# Patient Record
Sex: FEMALE | Race: OTHER | HISPANIC OR LATINO | Employment: FULL TIME | URBAN - METROPOLITAN AREA
[De-identification: names, ages, dates, MRNs, and addresses within clinical notes are randomized per-mention and may not be internally consistent; named-entity substitution may affect disease eponyms.]

---

## 2017-10-24 ENCOUNTER — APPOINTMENT (OUTPATIENT)
Dept: LAB | Facility: MEDICAL CENTER | Age: 42
End: 2017-10-24

## 2017-10-24 ENCOUNTER — TRANSCRIBE ORDERS (OUTPATIENT)
Dept: URGENT CARE | Facility: MEDICAL CENTER | Age: 42
End: 2017-10-24

## 2017-10-24 DIAGNOSIS — Z00.00 PHYSICAL EXAM: ICD-10-CM

## 2017-10-24 DIAGNOSIS — Z00.00 PHYSICAL EXAM: Primary | ICD-10-CM

## 2017-10-24 PROCEDURE — 36415 COLL VENOUS BLD VENIPUNCTURE: CPT

## 2017-10-24 PROCEDURE — 86480 TB TEST CELL IMMUN MEASURE: CPT

## 2017-10-26 LAB
ANNOTATION COMMENT IMP: NORMAL
GAMMA INTERFERON BACKGROUND BLD IA-ACNC: 0.09 IU/ML
M TB IFN-G BLD-IMP: NEGATIVE
M TB IFN-G CD4+ BCKGRND COR BLD-ACNC: 0 IU/ML
M TB IFN-G CD4+ T-CELLS BLD-ACNC: 0.09 IU/ML
MITOGEN IGNF BLD-ACNC: 6.56 IU/ML
QUANTIFERON-TB GOLD IN TUBE: NORMAL
SERVICE CMNT-IMP: NORMAL

## 2017-11-18 ENCOUNTER — APPOINTMENT (OUTPATIENT)
Dept: LAB | Facility: CLINIC | Age: 42
End: 2017-11-18

## 2017-11-18 ENCOUNTER — TRANSCRIBE ORDERS (OUTPATIENT)
Dept: LAB | Facility: CLINIC | Age: 42
End: 2017-11-18

## 2017-11-18 DIAGNOSIS — Z01.84 IMMUNITY STATUS TESTING: Primary | ICD-10-CM

## 2017-11-18 DIAGNOSIS — Z01.84 IMMUNITY STATUS TESTING: ICD-10-CM

## 2017-11-18 PROCEDURE — 86787 VARICELLA-ZOSTER ANTIBODY: CPT

## 2017-11-18 PROCEDURE — 36415 COLL VENOUS BLD VENIPUNCTURE: CPT

## 2017-11-21 LAB — VZV IGG SER IA-ACNC: NORMAL

## 2018-03-12 ENCOUNTER — OFFICE VISIT (OUTPATIENT)
Dept: OBGYN CLINIC | Facility: CLINIC | Age: 43
End: 2018-03-12
Payer: COMMERCIAL

## 2018-03-12 VITALS
SYSTOLIC BLOOD PRESSURE: 122 MMHG | HEIGHT: 60 IN | WEIGHT: 172 LBS | BODY MASS INDEX: 33.77 KG/M2 | DIASTOLIC BLOOD PRESSURE: 60 MMHG

## 2018-03-12 DIAGNOSIS — Z01.419 GYNECOLOGIC EXAM NORMAL: Primary | ICD-10-CM

## 2018-03-12 DIAGNOSIS — Z12.4 PAP SMEAR FOR CERVICAL CANCER SCREENING: ICD-10-CM

## 2018-03-12 DIAGNOSIS — Z12.39 SCREENING FOR MALIGNANT NEOPLASM OF BREAST: ICD-10-CM

## 2018-03-12 PROCEDURE — G0145 SCR C/V CYTO,THINLAYER,RESCR: HCPCS | Performed by: OBSTETRICS & GYNECOLOGY

## 2018-03-12 PROCEDURE — 99396 PREV VISIT EST AGE 40-64: CPT | Performed by: OBSTETRICS & GYNECOLOGY

## 2018-03-12 PROCEDURE — 87624 HPV HI-RISK TYP POOLED RSLT: CPT | Performed by: OBSTETRICS & GYNECOLOGY

## 2018-03-12 NOTE — PROGRESS NOTES
Chris Bloom is a 44 y o  female  who presents for annual well woman exam  Periods are regular every 28-30 days, lasting 2 days  No intermenstrual bleeding, spotting, or discharge  Menses have been light and very well controlled since ablation  Patient reports No hot flashes/night sweats, No pain problems intercourse, Yes  vaginal dryness, using lubrication  still very busy and traveling  sleeping fairly well   Often has trouble swallowing sleep or getting back to sleep after waking but overall okay    Current contraception: tubal ligation  History of abnormal Pap smear: no  Family history of uterine or ovarian cancer: no  Regular self breast exam: yes  History of abnormal mammogram: no  Family history of breast cancer: no  Family history of heart disease diabetes and hypertension paternal grandmother with vulvar cancer    Menstrual History:  OB History      Para Term  AB Living    6         3    SAB TAB Ectopic Multiple Live Births            1         Menarche age: 7   LMP Mar 8, 2018     The following portions of the patient's history were reviewed and updated as appropriate: allergies, current medications, past family history, past medical history, past social history, past surgical history and problem list   Past Medical History:   Diagnosis Date    Palpitations     Thyroid nodule     Last Assessed: 2014      Past Surgical History:   Procedure Laterality Date    BREAST BIOPSY Right     Percutaneous Needle Core Right      SECTION      CHOLECYSTECTOMY      Laparoscopic     TUBAL LIGATION      WISDOM TOOTH EXTRACTION       OB History      Para Term  AB Living    6         3    SAB TAB Ectopic Multiple Live Births            3       1998  female elevated BP, May 2002  for breech male, 2004  female     Review of Systems  Review of Systems   Constitutional: Negative for fatigue, fever and unexpected weight change  HENT: Negative for dental problem, mouth sores, nosebleeds, rhinorrhea, sinus pain, sinus pressure and sore throat  Eyes: Negative for pain, discharge and visual disturbance  Respiratory: Negative for cough, chest tightness, shortness of breath and wheezing  Cardiovascular: Negative for chest pain, palpitations and leg swelling  Gastrointestinal: Negative for blood in stool, constipation, diarrhea, nausea and vomiting  Endocrine: Negative for polydipsia  Genitourinary: Negative for difficulty urinating, dyspareunia, dysuria, menstrual problem, pelvic pain, urgency, vaginal discharge and vaginal pain  Musculoskeletal: Negative for arthralgias, back pain and joint swelling  Allergic/Immunologic: Negative for environmental allergies  Neurological: Negative for seizures, light-headedness and headaches  Hematological: Does not bruise/bleed easily  Psychiatric/Behavioral: Negative for sleep disturbance  The patient is not nervous/anxious  Objective      There were no vitals taken for this visit     Vitals:    18 1000   BP: 122/60         General:   alert and oriented, in no acute distress, alert, appears stated age and cooperative   Heart: regular rate and rhythm, S1, S2 normal, no murmur, click, rub or gallop   Lungs: clear to auscultation bilaterally   Abdomen: soft, non-tender, without masses or organomegaly   Vulva: normal   Vagina: normal mucosa   Cervix: no cervical motion tenderness and no lesions   Uterus: Top-normal size anteverted freely mobile, possible fibroid   Adnexa: normal adnexa and no mass, fullness, tenderness   inspection negative, no nipple discharge or bleeding, no masses or nodularity palpable  deferred  Pupils equal round reactive to light and accommodation  Throat clear no lesions or findings  Thyroid normal no palpable masses or nodules            Assessment   44year-old  here for annual exam   Patient is generally doing well and adjusting well to her new job  Plan   Patient given slip for mammogram, patient should return in 1 year or sooner as needed Pap with Co testing performed today last Pap was 2015 normal and last mammogram was last year

## 2018-03-14 LAB — HPV RRNA GENITAL QL NAA+PROBE: NORMAL

## 2018-03-15 LAB
LAB AP GYN PRIMARY INTERPRETATION: NORMAL
LAB AP LMP: NORMAL
Lab: NORMAL

## 2018-04-12 ENCOUNTER — HOSPITAL ENCOUNTER (OUTPATIENT)
Dept: RADIOLOGY | Age: 43
Discharge: HOME | End: 2018-04-12
Payer: COMMERCIAL

## 2018-04-12 DIAGNOSIS — Z12.39 SCREENING FOR MALIGNANT NEOPLASM OF BREAST: ICD-10-CM

## 2018-04-12 PROCEDURE — 77067 SCR MAMMO BI INCL CAD: CPT

## 2018-04-12 PROCEDURE — 77063 BREAST TOMOSYNTHESIS BI: CPT

## 2018-04-18 DIAGNOSIS — R92.8 ABNORMAL MAMMOGRAM OF BOTH BREASTS: Primary | ICD-10-CM

## 2018-04-24 ENCOUNTER — HOSPITAL ENCOUNTER (OUTPATIENT)
Dept: ULTRASOUND IMAGING | Facility: CLINIC | Age: 43
Discharge: HOME | End: 2018-04-24
Payer: COMMERCIAL

## 2018-04-24 ENCOUNTER — TRANSCRIBE ORDERS (OUTPATIENT)
Dept: ADMINISTRATIVE | Facility: HOSPITAL | Age: 43
End: 2018-04-24

## 2018-04-24 ENCOUNTER — HOSPITAL ENCOUNTER (OUTPATIENT)
Dept: MAMMOGRAPHY | Facility: CLINIC | Age: 43
Discharge: HOME | End: 2018-04-24
Payer: COMMERCIAL

## 2018-04-24 DIAGNOSIS — R92.8 ABNORMAL MAMMOGRAM: Primary | ICD-10-CM

## 2018-04-24 DIAGNOSIS — R92.8 ABNORMAL MAMMOGRAM OF BOTH BREASTS: ICD-10-CM

## 2018-04-24 PROCEDURE — 76642 ULTRASOUND BREAST LIMITED: CPT

## 2018-04-24 PROCEDURE — 77066 DX MAMMO INCL CAD BI: CPT

## 2018-04-24 PROCEDURE — G0279 TOMOSYNTHESIS, MAMMO: HCPCS

## 2018-05-14 ENCOUNTER — APPOINTMENT (EMERGENCY)
Dept: ULTRASOUND IMAGING | Facility: HOSPITAL | Age: 43
End: 2018-05-14
Payer: COMMERCIAL

## 2018-05-14 ENCOUNTER — APPOINTMENT (EMERGENCY)
Dept: CT IMAGING | Facility: HOSPITAL | Age: 43
End: 2018-05-14
Payer: COMMERCIAL

## 2018-05-14 ENCOUNTER — TELEPHONE (OUTPATIENT)
Dept: OBGYN CLINIC | Facility: CLINIC | Age: 43
End: 2018-05-14

## 2018-05-14 ENCOUNTER — HOSPITAL ENCOUNTER (EMERGENCY)
Facility: HOSPITAL | Age: 43
Discharge: HOME | End: 2018-05-14
Attending: EMERGENCY MEDICINE | Admitting: EMERGENCY MEDICINE
Payer: COMMERCIAL

## 2018-05-14 VITALS
DIASTOLIC BLOOD PRESSURE: 63 MMHG | TEMPERATURE: 97.7 F | SYSTOLIC BLOOD PRESSURE: 132 MMHG | BODY MASS INDEX: 33.79 KG/M2 | WEIGHT: 173 LBS | RESPIRATION RATE: 18 BRPM | HEART RATE: 64 BPM | OXYGEN SATURATION: 100 %

## 2018-05-14 DIAGNOSIS — R10.30 LOWER ABDOMINAL PAIN: Primary | ICD-10-CM

## 2018-05-14 DIAGNOSIS — N94.89 FEMALE PELVIC CONGESTION SYNDROME: ICD-10-CM

## 2018-05-14 DIAGNOSIS — R11.0 NAUSEA: ICD-10-CM

## 2018-05-14 LAB
ALBUMIN SERPL BCP-MCNC: 3.9 G/DL (ref 3.5–5)
ALP SERPL-CCNC: 67 U/L (ref 46–116)
ALT SERPL W P-5'-P-CCNC: 27 U/L (ref 12–78)
ANION GAP SERPL CALCULATED.3IONS-SCNC: 8 MMOL/L (ref 4–13)
AST SERPL W P-5'-P-CCNC: 23 U/L (ref 5–45)
ATRIAL RATE: 71 BPM
B-HCG SERPL-ACNC: <2 MIU/ML
BACTERIA UR QL AUTO: ABNORMAL /HPF
BASOPHILS # BLD AUTO: 0.02 THOUSANDS/ΜL (ref 0–0.1)
BASOPHILS NFR BLD AUTO: 0 % (ref 0–1)
BILIRUB SERPL-MCNC: 0.4 MG/DL (ref 0.2–1)
BILIRUB UR QL STRIP: NEGATIVE
BUN SERPL-MCNC: 15 MG/DL (ref 5–25)
CALCIUM SERPL-MCNC: 9.2 MG/DL (ref 8.3–10.1)
CHLORIDE SERPL-SCNC: 104 MMOL/L (ref 100–108)
CLARITY UR: CLEAR
CO2 SERPL-SCNC: 28 MMOL/L (ref 21–32)
COLOR UR: YELLOW
CREAT SERPL-MCNC: 0.69 MG/DL (ref 0.6–1.3)
EOSINOPHIL # BLD AUTO: 0.13 THOUSAND/ΜL (ref 0–0.61)
EOSINOPHIL NFR BLD AUTO: 2 % (ref 0–6)
ERYTHROCYTE [DISTWIDTH] IN BLOOD BY AUTOMATED COUNT: 12.6 % (ref 11.6–15.1)
EXT PREG TEST URINE: NEGATIVE
GFR SERPL CREATININE-BSD FRML MDRD: 108 ML/MIN/1.73SQ M
GLUCOSE SERPL-MCNC: 117 MG/DL (ref 65–140)
GLUCOSE UR STRIP-MCNC: NEGATIVE MG/DL
HCT VFR BLD AUTO: 40.8 % (ref 34.8–46.1)
HGB BLD-MCNC: 14 G/DL (ref 11.5–15.4)
HGB UR QL STRIP.AUTO: ABNORMAL
KETONES UR STRIP-MCNC: NEGATIVE MG/DL
LACTATE SERPL-SCNC: 0.8 MMOL/L (ref 0.5–2)
LEUKOCYTE ESTERASE UR QL STRIP: NEGATIVE
LIPASE SERPL-CCNC: 216 U/L (ref 73–393)
LYMPHOCYTES # BLD AUTO: 2.26 THOUSANDS/ΜL (ref 0.6–4.47)
LYMPHOCYTES NFR BLD AUTO: 29 % (ref 14–44)
MCH RBC QN AUTO: 29.2 PG (ref 26.8–34.3)
MCHC RBC AUTO-ENTMCNC: 34.3 G/DL (ref 31.4–37.4)
MCV RBC AUTO: 85 FL (ref 82–98)
MONOCYTES # BLD AUTO: 0.36 THOUSAND/ΜL (ref 0.17–1.22)
MONOCYTES NFR BLD AUTO: 5 % (ref 4–12)
MUCOUS THREADS UR QL AUTO: ABNORMAL
NEUTROPHILS # BLD AUTO: 4.93 THOUSANDS/ΜL (ref 1.85–7.62)
NEUTS SEG NFR BLD AUTO: 64 % (ref 43–75)
NITRITE UR QL STRIP: NEGATIVE
NON-SQ EPI CELLS URNS QL MICRO: ABNORMAL /HPF
P AXIS: 61 DEGREES
PH UR STRIP.AUTO: 6.5 [PH] (ref 4.5–8)
PLATELET # BLD AUTO: 330 THOUSANDS/UL (ref 149–390)
PMV BLD AUTO: 9.4 FL (ref 8.9–12.7)
POTASSIUM SERPL-SCNC: 4.1 MMOL/L (ref 3.5–5.3)
PR INTERVAL: 164 MS
PROT SERPL-MCNC: 7.9 G/DL (ref 6.4–8.2)
PROT UR STRIP-MCNC: NEGATIVE MG/DL
QRS AXIS: 40 DEGREES
QRSD INTERVAL: 78 MS
QT INTERVAL: 374 MS
QTC INTERVAL: 406 MS
RBC # BLD AUTO: 4.8 MILLION/UL (ref 3.81–5.12)
RBC #/AREA URNS AUTO: ABNORMAL /HPF
SODIUM SERPL-SCNC: 140 MMOL/L (ref 136–145)
SP GR UR STRIP.AUTO: 1.02 (ref 1–1.03)
T WAVE AXIS: 35 DEGREES
UROBILINOGEN UR QL STRIP.AUTO: 0.2 E.U./DL
VENTRICULAR RATE: 71 BPM
WBC # BLD AUTO: 7.7 THOUSAND/UL (ref 4.31–10.16)
WBC #/AREA URNS AUTO: ABNORMAL /HPF

## 2018-05-14 PROCEDURE — 74177 CT ABD & PELVIS W/CONTRAST: CPT

## 2018-05-14 PROCEDURE — 76856 US EXAM PELVIC COMPLETE: CPT

## 2018-05-14 PROCEDURE — 81001 URINALYSIS AUTO W/SCOPE: CPT

## 2018-05-14 PROCEDURE — 81025 URINE PREGNANCY TEST: CPT | Performed by: EMERGENCY MEDICINE

## 2018-05-14 PROCEDURE — 80053 COMPREHEN METABOLIC PANEL: CPT | Performed by: EMERGENCY MEDICINE

## 2018-05-14 PROCEDURE — 96361 HYDRATE IV INFUSION ADD-ON: CPT

## 2018-05-14 PROCEDURE — 36415 COLL VENOUS BLD VENIPUNCTURE: CPT | Performed by: EMERGENCY MEDICINE

## 2018-05-14 PROCEDURE — 83605 ASSAY OF LACTIC ACID: CPT | Performed by: EMERGENCY MEDICINE

## 2018-05-14 PROCEDURE — 96376 TX/PRO/DX INJ SAME DRUG ADON: CPT

## 2018-05-14 PROCEDURE — 83690 ASSAY OF LIPASE: CPT | Performed by: EMERGENCY MEDICINE

## 2018-05-14 PROCEDURE — 96375 TX/PRO/DX INJ NEW DRUG ADDON: CPT

## 2018-05-14 PROCEDURE — 96374 THER/PROPH/DIAG INJ IV PUSH: CPT

## 2018-05-14 PROCEDURE — 85025 COMPLETE CBC W/AUTO DIFF WBC: CPT | Performed by: EMERGENCY MEDICINE

## 2018-05-14 PROCEDURE — 76830 TRANSVAGINAL US NON-OB: CPT

## 2018-05-14 PROCEDURE — 84702 CHORIONIC GONADOTROPIN TEST: CPT | Performed by: EMERGENCY MEDICINE

## 2018-05-14 PROCEDURE — 93010 ELECTROCARDIOGRAM REPORT: CPT | Performed by: INTERNAL MEDICINE

## 2018-05-14 PROCEDURE — 93005 ELECTROCARDIOGRAM TRACING: CPT

## 2018-05-14 PROCEDURE — 87086 URINE CULTURE/COLONY COUNT: CPT | Performed by: EMERGENCY MEDICINE

## 2018-05-14 PROCEDURE — 99284 EMERGENCY DEPT VISIT MOD MDM: CPT

## 2018-05-14 RX ORDER — SODIUM CHLORIDE 9 MG/ML
125 INJECTION, SOLUTION INTRAVENOUS CONTINUOUS
Status: DISCONTINUED | OUTPATIENT
Start: 2018-05-14 | End: 2018-05-14 | Stop reason: HOSPADM

## 2018-05-14 RX ORDER — ONDANSETRON 2 MG/ML
4 INJECTION INTRAMUSCULAR; INTRAVENOUS ONCE
Status: COMPLETED | OUTPATIENT
Start: 2018-05-14 | End: 2018-05-14

## 2018-05-14 RX ORDER — ONDANSETRON 4 MG/1
4 TABLET, ORALLY DISINTEGRATING ORAL EVERY 8 HOURS PRN
Qty: 15 TABLET | Refills: 0 | Status: SHIPPED | OUTPATIENT
Start: 2018-05-14 | End: 2020-05-11 | Stop reason: ALTCHOICE

## 2018-05-14 RX ORDER — OXYCODONE HYDROCHLORIDE AND ACETAMINOPHEN 5; 325 MG/1; MG/1
1 TABLET ORAL EVERY 4 HOURS PRN
Qty: 20 TABLET | Refills: 0 | Status: SHIPPED | OUTPATIENT
Start: 2018-05-14 | End: 2018-05-18

## 2018-05-14 RX ORDER — KETOROLAC TROMETHAMINE 30 MG/ML
30 INJECTION, SOLUTION INTRAMUSCULAR; INTRAVENOUS ONCE
Status: COMPLETED | OUTPATIENT
Start: 2018-05-14 | End: 2018-05-14

## 2018-05-14 RX ORDER — NAPROXEN SODIUM 550 MG/1
550 TABLET ORAL 2 TIMES DAILY WITH MEALS
Qty: 20 TABLET | Refills: 0 | Status: SHIPPED | OUTPATIENT
Start: 2018-05-14 | End: 2020-05-11 | Stop reason: ALTCHOICE

## 2018-05-14 RX ADMIN — HYDROMORPHONE HYDROCHLORIDE 0.5 MG: 1 INJECTION, SOLUTION INTRAMUSCULAR; INTRAVENOUS; SUBCUTANEOUS at 04:05

## 2018-05-14 RX ADMIN — HYDROMORPHONE HYDROCHLORIDE 0.5 MG: 1 INJECTION, SOLUTION INTRAMUSCULAR; INTRAVENOUS; SUBCUTANEOUS at 04:59

## 2018-05-14 RX ADMIN — ONDANSETRON 4 MG: 2 INJECTION INTRAMUSCULAR; INTRAVENOUS at 03:06

## 2018-05-14 RX ADMIN — IOHEXOL 100 ML: 350 INJECTION, SOLUTION INTRAVENOUS at 03:57

## 2018-05-14 RX ADMIN — KETOROLAC TROMETHAMINE 30 MG: 30 INJECTION, SOLUTION INTRAMUSCULAR at 03:08

## 2018-05-14 RX ADMIN — SODIUM CHLORIDE 1000 ML: 0.9 INJECTION, SOLUTION INTRAVENOUS at 03:04

## 2018-05-14 RX ADMIN — ONDANSETRON 4 MG: 2 INJECTION INTRAMUSCULAR; INTRAVENOUS at 04:03

## 2018-05-14 RX ADMIN — HYDROMORPHONE HYDROCHLORIDE 0.5 MG: 1 INJECTION, SOLUTION INTRAMUSCULAR; INTRAVENOUS; SUBCUTANEOUS at 05:34

## 2018-05-14 NOTE — ED PROVIDER NOTES
History  Chief Complaint   Patient presents with    Abdominal Pain     Pt presents to ED for evaluation and treatment of right lower quadrant abdominal pain which began 1 5 hours ago     Patient is a 44year old female with constant worsening RLQ abdominal pain with nausea for past 1 5 hours  No fever  No vomiting or diarrhea  No GI bleeding  No urinary sx  States she did not drive here  Wants toradol and no narcotic pain medication at this time  Has had prior TL,  and ccy in the past  Was last seen in this ED on 16 for chest pain  [facility] patient and patient not found  (+) radiation of pain to suprapubic region and down right leg as well  History provided by:  Patient and spouse   used: No    Abdominal Pain   Associated symptoms: nausea    Associated symptoms: no diarrhea, no fever and no vomiting        None       Past Medical History:   Diagnosis Date    Palpitations     Thyroid nodule     Last Assessed: 2014        Past Surgical History:   Procedure Laterality Date    BREAST BIOPSY Right     Percutaneous Needle Core Right      SECTION      CHOLECYSTECTOMY      Laparoscopic     TUBAL LIGATION      WISDOM TOOTH EXTRACTION         Family History   Problem Relation Age of Onset    Hypertension Mother     Heart disease Mother     Nephrolithiasis Mother     Hypertension Maternal Grandmother     Diabetes Maternal Grandmother     Stroke Maternal Grandmother      Stroke Syndrome     Other Maternal Grandfather      CABG     Stroke Maternal Grandfather      Stroke Syndrome     Diabetes Paternal Grandmother     Cancer Paternal Grandmother     Hyperthyroidism Family     Hypothyroidism Family     Hodgkin's lymphoma Paternal Uncle      I have reviewed and agree with the history as documented      Social History   Substance Use Topics    Smoking status: Never Smoker    Smokeless tobacco: Not on file    Alcohol use Yes Comment: social        Review of Systems   Constitutional: Negative for fever  Gastrointestinal: Positive for abdominal pain and nausea  Negative for blood in stool, diarrhea and vomiting  Genitourinary: Negative for difficulty urinating  All other systems reviewed and are negative  Physical Exam  ED Triage Vitals [05/14/18 0238]   Temperature Pulse Respirations Blood Pressure SpO2   97 7 °F (36 5 °C) 87 18 146/84 98 %      Temp Source Heart Rate Source Patient Position - Orthostatic VS BP Location FiO2 (%)   Oral Monitor Sitting Right arm --      Pain Score       7           Orthostatic Vital Signs  Vitals:    05/14/18 0238 05/14/18 0545   BP: 146/84 132/63   Pulse: 87 64   Patient Position - Orthostatic VS: Sitting Lying       Physical Exam   Constitutional: She is oriented to person, place, and time  She appears well-developed and well-nourished  She appears distressed (moderate)  HENT:   Head: Normocephalic and atraumatic  Mucous membranes somewhat moist     Eyes: No scleral icterus  Neck: No tracheal deviation present  Cardiovascular: Normal rate, regular rhythm and normal heart sounds  No murmur heard  Pulmonary/Chest: Effort normal and breath sounds normal  No stridor  No respiratory distress  Abdominal: Soft  Bowel sounds are normal  She exhibits no distension  There is tenderness (RLQ)  There is no rebound and no guarding  No CVAT  Musculoskeletal: She exhibits no edema or deformity  Neurological: She is alert and oriented to person, place, and time  Skin: Skin is warm and dry  No rash noted  Psychiatric: She has a normal mood and affect  Nursing note and vitals reviewed        ED Medications  Medications   sodium chloride 0 9 % infusion (not administered)   ondansetron (ZOFRAN) injection 4 mg (4 mg Intravenous Given 5/14/18 0306)   ketorolac (TORADOL) injection 30 mg (30 mg Intravenous Given 5/14/18 0308)   sodium chloride 0 9 % bolus 1,000 mL (1,000 mL Intravenous New Bag 5/14/18 0304)   iohexol (OMNIPAQUE) 350 MG/ML injection (MULTI-DOSE) 100 mL (100 mL Intravenous Given 5/14/18 0357)   HYDROmorphone (DILAUDID) injection 0 5 mg (0 5 mg Intravenous Given 5/14/18 0405)   ondansetron (ZOFRAN) injection 4 mg (4 mg Intravenous Given 5/14/18 0403)   HYDROmorphone (DILAUDID) injection 0 5 mg (0 5 mg Intravenous Given 5/14/18 0459)   HYDROmorphone (DILAUDID) injection 1 mg (0 5 mg Intravenous Given 5/14/18 0534)       Diagnostic Studies  Results Reviewed     Procedure Component Value Units Date/Time    Lactic acid, plasma [[id number]]  (Normal) Collected:  05/14/18 0256    Lab Status:  Final result Specimen:  Blood from Arm, Left Updated:  05/14/18 0334     LACTIC ACID 0 8 mmol/L     Narrative:         Result may be elevated if tourniquet was used during collection  Comprehensive metabolic panel [[id number]] Collected:  05/14/18 0256    Lab Status:  Final result Specimen:  Blood from Arm, Left Updated:  05/14/18 0326     Sodium 140 mmol/L      Potassium 4 1 mmol/L      Chloride 104 mmol/L      CO2 28 mmol/L      Anion Gap 8 mmol/L      BUN 15 mg/dL      Creatinine 0 69 mg/dL      Glucose 117 mg/dL      Calcium 9 2 mg/dL      AST 23 U/L      ALT 27 U/L      Alkaline Phosphatase 67 U/L      Total Protein 7 9 g/dL      Albumin 3 9 g/dL      Total Bilirubin 0 40 mg/dL      eGFR 108 ml/min/1 73sq m     Narrative:         National Kidney Disease Education Program recommendations are as follows:  GFR calculation is accurate only with a steady state creatinine  Chronic Kidney disease less than 60 ml/min/1 73 sq  meters  Kidney failure less than 15 ml/min/1 73 sq  meters      Quantitative hCG [[id number]]  (Normal) Collected:  05/14/18 0256    Lab Status:  Final result Specimen:  Blood from Arm, Left Updated:  05/14/18 0326     HCG, Quant <2 mIU/mL     Narrative:          Expected Ranges:     Approximate               Approximate HCG  Gestation age          Concentration ( mIU/mL)  _____________ ______________________   [facility]                      HCG values  0 2-1                       5-50  1-2                           2-3                         100-5000  3-4                         500-96940  4-5                         1000-11616  5-6                         [id number]  6-8                         [id number]  8-12                        03971-667506    Urine culture [[id number]] Collected:  05/14/18 0307    Lab Status:   In process Specimen:  Urine from Urine, Clean Catch Updated:  05/14/18 0326    Lipase [[id number]]  (Normal) Collected:  05/14/18 0256    Lab Status:  Final result Specimen:  Blood from Arm, Left Updated:  05/14/18 0324     Lipase 216 u/L     Urine Microscopic [[id number]]  (Abnormal) Collected:  05/14/18 0310    Lab Status:  Final result Specimen:  Urine from Urine, Clean Catch Updated:  05/14/18 0321     RBC, UA 1-2 (A) /hpf      WBC, UA 1-2 (A) /hpf      Epithelial Cells Moderate (A) /hpf      Bacteria, UA Moderate (A) /hpf      MUCOUS THREADS Occasional (A)    POCT pregnancy, urine [[id number]]  (Normal) Resulted:  05/14/18 0305    Lab Status:  Final result Updated:  05/14/18 0308     EXT PREG TEST UR (Ref: Negative) negative    ED Urine Macroscopic [[id number]]  (Abnormal) Collected:  05/14/18 0310    Lab Status:  Final result Specimen:  Urine Updated:  05/14/18 0307     Color, UA Yellow     Clarity, UA Clear     pH, UA 6 5     Leukocytes, UA Negative     Nitrite, UA Negative     Protein, UA Negative mg/dl      Glucose, UA Negative mg/dl      Ketones, UA Negative mg/dl      Urobilinogen, UA 0 2 E U /dl      Bilirubin, UA Negative     Blood, UA Trace (A)     Specific [place], UA 1 020    Narrative:       CLINITEK RESULT    CBC and differential [[id number]]  (Normal) Collected:  05/14/18 0256    Lab Status:  Final result Specimen:  Blood from Arm, Left Updated:  05/14/18 0302     WBC 7 70 Thousand/uL      RBC 4 80 Million/uL      Hemoglobin 14 0 g/dL      Hematocrit 40 8 % MCV 85 fL      MCH 29 2 pg      MCHC 34 3 g/dL      RDW 12 6 %      MPV 9 4 fL      Platelets 444 Thousands/uL      Neutrophils Relative 64 %      Lymphocytes Relative 29 %      Monocytes Relative 5 %      Eosinophils Relative 2 %      Basophils Relative 0 %      Neutrophils Absolute 4 93 Thousands/µL      Lymphocytes Absolute 2 26 Thousands/µL      Monocytes Absolute 0 36 Thousand/µL      Eosinophils Absolute 0 13 Thousand/µL      Basophils Absolute 0 02 Thousands/µL                  US pelvis complete w transvaginal   ED Interpretation by Antwon Aponte MD (05/14 0800)   FINDINGS:      UTERUS:   The uterus is anteverted in position, measuring 12 3 x 4 0 x 6 6 cm  Contour and echotexture appear normal    The cervix shows no suspicious abnormality   A tiny nabothian cyst noted  ENDOMETRIUM:     Normal caliber of 5 7 mm  Homogenous and normal in appearance  OVARIES/ADNEXA:   Right ovary:  3 2 x 1 4 x 2 7 cm  No suspicious right ovarian abnormality    The ovary is seen high in the pelvis on transabdominal imaging only  Doppler flow within normal limits  Left ovary:  2 2 x 1 0 x 1 7 cm  No suspicious left ovarian abnormality  Doppler flow within normal limits  No suspicious adnexal mass or loculated collections  There is no free fluid  Impression:         Normal pelvic ultrasound without evidence for ovarian torsion                       Workstation performed: [id number]         Final Result by Lesly Barragan MD (05/14 4348)       Normal pelvic ultrasound without evidence for ovarian torsion  Workstation performed: [id number]         CT abdomen pelvis with contrast   ED Interpretation by Antwon Aponte MD (53/48 5200)   COMPARISON:   TCT ABD/PELVIS W/ [id number] (3262) 2014-04-01 06:38   FINDINGS:   Lung bases: Unremarkable  No mass  No consolidation  ABDOMEN:   Liver: Unremarkable  No mass  Gallbladder and bile ducts:  The patient is status post cholecystectomy  No ductal dilation  Pancreas: Unremarkable  No mass  No ductal dilation  Spleen: Unremarkable  No splenomegaly  Adrenals: Unremarkable  No mass  Kidneys and ureters: See below  Stomach and bowel: See below  PELVIS:   Appendix: No findings to suggest acute appendicitis  Bladder: Unremarkable  No mass  Reproductive: Unremarkable as visualized  ABDOMEN and PELVIS:   Intraperitoneal space: Unremarkable  No free air  No significant fluid collection  Bones/joints: No acute fracture  No dislocation  Soft tissues: Unremarkable  Vasculature: Prominent parametrial vessels on the left and left-sided gonadal vein noted  There is a   narrowed appearance of the left renal vein as it crosses between the aorta and the transverse portion of the duodenum  Pelvic congestion syndrome secondary to a variant of Nutcracker syndrome not   excluded in the correct clinical setting, which could include intermittent hematuria and left-sided   pelvic/flank pain    No abdominal aortic aneurysm  Lymph nodes: Unremarkable  No enlarged lymph nodes  IMPRESSION:   Prominent parametrial vessels on the left and left-sided gonadal vein noted  There is a narrowed   appearance of the left renal vein as it crosses between the aorta and the transverse portion of the   duodenum  Pelvic congestion syndrome secondary to a variant of Nutcracker syndrome not excluded   in the correct clinical setting, which could include intermittent hematuria and left-sided pelvic/flank   pain      Thank you for allowing us to participate in the care of your patient  Dictated and Authenticated by: Demetrius Ritter MD   05/14/2018 4:53 AM [facility] Time ([address] [place] 9005)      Final Result by Samina Cochran MD (27/43 4283)         1  Normal appendix  2   Prominent left adnexal vessels and left-sided gonadal vein with mild narrowing of the left renal vein between the duodenum and aorta concerning for pelvic congestion syndrome    Correlation with the clinical scenario recommended  Findings are consistent with the preliminary report from [facility] which was provided shortly after completion of the exam                Workstation performed: [id number]                    Procedures  ECG 12 Lead Documentation  Date/Time: 5/14/2018 3:03 AM  Performed by: Lora Choudhury  Authorized by: Lora Choudhury     Indications / Diagnosis:  Abdominal pain  ECG reviewed by me, the ED Provider: yes    Patient location:  ED  Previous ECG:     Previous ECG:  Compared to current    Comparison ECG info:  1/4/16    Similarity:  No change  Interpretation:     Interpretation: normal    Rate:     ECG rate:  71    ECG rate assessment: normal    Rhythm:     Rhythm: sinus rhythm    Ectopy:     Ectopy: none    QRS:     QRS axis:  Normal    QRS intervals:  Normal  Conduction:     Conduction: normal    ST segments:     ST segments:  Normal  T waves:     T waves: normal             Phone Contacts  ED Phone Contact    ED Course  ED Course as of May 14 0807   Mon May 14, 2018   0329 Pain and nausea improved  Labs d/w patient and       0401 Pain and nausea worsening so IV dilaudid and more IV zofran ordered  4033 Patient still with pain so more IV dilaudid ordered  5119 Patient still with pain so more IV dilaudid ordered  Pelvic US ordered to rule out ovarian torsion  0800 No acute abdomen prior to discharge  Patient sees Dr Joie Buchanan for gyn                                   MDM  Number of Diagnoses or Management Options  Diagnosis management comments: DDx including but not limited to: appendicitis, gastroenteritis, gastritis, PUD, GERD, gastroparesis, hepatitis, pancreatitis, colitis, enteritis, diverticulitis, food poisoning, mesenteric adenitis, mesenteric ischemia, IBD, IBS, ileus, bowel obstruction, volvulus, internal hernia, AAA, choledocholithiasis, perforated viscus, splenic etiology, constipation, pelvic pathology, renal colic, pyelonephritis, UTI; doubt cardiac etiology  Amount and/or Complexity of Data Reviewed  Clinical lab tests: ordered and reviewed  Tests in the radiology section of CPT®: ordered and reviewed  Decide to obtain previous medical records or to obtain history from someone other than the patient: yes  Review and summarize past medical records: yes  Independent visualization of images, tracings, or specimens: yes      CritCare Time    Disposition  Final diagnoses:   Lower abdominal pain   Female pelvic congestion syndrome   Nausea     Time reflects when diagnosis was documented in both MDM as applicable and the Disposition within this note     Time User Action Codes Description Comment    5/14/2018  8:04 AM Mike Herman Add [R10 30] Lower abdominal pain     5/14/2018  8:04 AM Mike Herman Add [N94 89] Female pelvic congestion syndrome     5/14/2018  8:05 AM Mike Lewws Add [R11 0] Nausea       ED Disposition     ED Disposition Condition Comment    Discharge  [address] discharge to home/self care  Condition at discharge: Stable        Follow-up Information     Follow up With Specialties Details Why Seble Dotson MD Obstetrics and Gynecology, Obstetrics, Gynecology Call in 1 day Return sooner if increased pain, fever, vomiting, diarrhea, difficulty breathing or urinating  No driving with percocet  Do not use acetaminophen with percocet    [address]  [phone]          Patient's Medications   Discharge Prescriptions    NAPROXEN SODIUM (ANAPROX) 550 MG TABLET    Take 1 tablet (550 mg total) by mouth 2 (two) times a day with meals for 10 days       Start Date: 5/14/2018 End Date: 5/24/2018       Order Dose: 550 mg       Quantity: 20 tablet    Refills: 0    ONDANSETRON (ZOFRAN ODT) 4 MG DISINTEGRATING TABLET    Take 1 tablet (4 mg total) by mouth every 8 (eight) hours as needed for nausea or vomiting for up to 5 days       Start Date: 5/14/2018 End Date: 5/19/2018       Order Dose: 4 mg       Quantity: 15 tablet    Refills: 0    OXYCODONE-ACETAMINOPHEN (PERCOCET) 5-325 MG PER TABLET    Take 1 tablet by mouth every 4 (four) hours as needed for moderate pain for up to 4 days Max Daily Amount: 6 tablets       Start Date: 5/14/2018 End Date: 5/18/2018       Order Dose: 1 tablet       Quantity: 20 tablet    Refills: 0     No discharge procedures on file      ED Provider  Electronically Signed by           Osmani Caruso MD  05/14/18 5097

## 2018-05-14 NOTE — DISCHARGE INSTRUCTIONS
Abdominal Pain   WHAT YOU NEED TO KNOW:   Abdominal pain can be dull, achy, or sharp  You may have pain in one area of your abdomen, or in your entire abdomen  Your pain may be caused by a condition such as constipation, food sensitivity or poisoning, infection, or a blockage  Abdominal pain can also be from a hernia, appendicitis, or an ulcer  Liver, gallbladder, or kidney conditions can also cause abdominal pain  The cause of your abdominal pain may be unknown  DISCHARGE INSTRUCTIONS:   Return to the emergency department if:   · You have new chest pain or shortness of breath  · You have pulsing pain in your upper abdomen or lower back that suddenly becomes constant  · Your pain is in the right lower abdominal area and worsens with movement  · You have a fever over 100 4°F (38°C) or shaking chills  · You are vomiting and cannot keep food or liquids down  · Your pain does not improve or gets worse over the next 8 to 12 hours  · You see blood in your vomit or bowel movements, or they look black and tarry  · Your skin or the whites of your eyes turn yellow  · You are a woman and have a large amount of vaginal bleeding that is not your monthly period  Contact your healthcare provider if:   · You have pain in your lower back  · You are a man and have pain in your testicles  · You have pain when you urinate  · You have questions or concerns about your condition or care  Follow up with your healthcare provider within 24 hours or as directed:  Write down your questions so you remember to ask them during your visits  Medicines:   · Medicines  may be given to calm your stomach and prevent vomiting or to decrease pain  Ask how to take pain medicine safely  · Take your medicine as directed  Contact your healthcare provider if you think your medicine is not helping or if you have side effects  Tell him of her if you are allergic to any medicine   Keep a list of the medicines, vitamins, and herbs you take  Include the amounts, and when and why you take them  Bring the list or the pill bottles to follow-up visits  Carry your medicine list with you in case of an emergency  © 2017 [employer]  Information is for End User's use only and may not be sold, redistributed or otherwise used for commercial purposes  All illustrations and images included in CareNotes® are the copyrighted property of A D [employer]  or [employer]  The above information is an  only  It is not intended as medical advice for individual conditions or treatments  Talk to your doctor, nurse or pharmacist before following any medical regimen to see if it is safe and effective for you  Acute Nausea and Vomiting   WHAT YOU NEED TO KNOW:   Acute nausea and vomiting start suddenly, worsen quickly, and last a short time  DISCHARGE INSTRUCTIONS:   Return to the emergency department if:   · You see blood in your vomit or your bowel movements  · You have sudden, severe pain in your chest and upper abdomen after hard vomiting or retching  · You have swelling in your neck and chest      · You are dizzy, cold, and thirsty and your eyes and mouth are dry  · You are urinating very little or not at all  · You have muscle weakness, leg cramps, and trouble breathing  · Your heart is beating much faster than normal      · You continue to vomit for more than 48 hours  Contact your healthcare provider if:   · You have frequent dry heaves (vomiting but nothing comes out)  · Your nausea and vomiting does not get better or go away after you use medicine  · You have questions or concerns about your condition or treatment  Medicines: You may need any of the following:  · Medicines  may be given to calm your stomach and stop your vomiting  You may also need medicines to help you feel more relaxed or to stop nausea and vomiting caused by motion sickness      · Gastrointestinal stimulants  are used to help empty your stomach and bowels  This may help decrease nausea and vomiting  · Take your medicine as directed  Contact your healthcare provider if you think your medicine is not helping or if you have side effects  Tell him or her if you are allergic to any medicine  Keep a list of the medicines, vitamins, and herbs you take  Include the amounts, and when and why you take them  Bring the list or the pill bottles to follow-up visits  Carry your medicine list with you in case of an emergency  Prevent or manage acute nausea and vomiting:   · Do not drink alcohol  Alcohol may upset or irritate your stomach  Too much alcohol can also cause acute nausea and vomiting  · Control stress  Headaches due to stress may cause nausea and vomiting  Find ways to relax and manage your stress  Get more rest and sleep  · Drink more liquids as directed  Vomiting can lead to dehydration  It is important to drink more liquids to help replace lost body fluids  Ask your healthcare provider how much liquid to drink each day and which liquids are best for you  Your provider may recommend that you drink an oral rehydration solution (ORS)  ORS contains water, salts, and sugar that are needed to replace the lost body fluids  Ask what kind of ORS to use, how much to drink, and where to get it  · Eat smaller meals, more often  Eat small amounts of food every 2 to 3 hours, even if you are not hungry  Food in your stomach may decrease your nausea  · Talk to your healthcare provider before you take over-the-counter (OTC) medicines  These medicines can cause serious problems if you use certain other medicines, or you have a medical condition  You may have problems if you use too much or use them for longer than the label says  Follow directions on the label carefully    Follow up with your healthcare provider as directed:  Write down your questions so you remember to ask them during your follow-up visits  © 2017 [address] [facility] Information is for End User's use only and may not be sold, redistributed or otherwise used for commercial purposes  All illustrations and images included in CareNotes® are the copyrighted property of A D A M , Inc  or [employer]  The above information is an  only  It is not intended as medical advice for individual conditions or treatments  Talk to your doctor, nurse or pharmacist before following any medical regimen to see if it is safe and effective for you  Pelvic Pain   WHAT YOU NEED TO KNOW:   Pelvic pain may be caused by a number of conditions, such as irritable bowel syndrome, appendicitis, or constipation  A urinary tract infection, prostate inflammation, menstrual cramps, or kidney stones can also cause pelvic pain  You may have pain on one or both sides of your pelvis  Pelvic pain can develop if you have trauma to your pelvis or if you sit or stand for a long time  DISCHARGE INSTRUCTIONS:   Medicines: You may need any of the following:  · NSAIDs , such as ibuprofen, help decrease swelling, pain, and fever  This medicine is available with or without a doctor's order  NSAIDs can cause stomach bleeding or kidney problems in certain people  If you take blood thinner medicine, always ask your healthcare provider if NSAIDs are safe for you  Always read the medicine label and follow directions  · Prescription pain medicine  may be given  Ask how to take this medicine safely  · Birth control medicines  may help decrease pain in women  · Take your medicine as directed  Contact your healthcare provider if you think your medicine is not helping or if you have side effects  Tell him or her if you are allergic to any medicine  Keep a list of the medicines, vitamins, and herbs you take  Include the amounts, and when and why you take them  Bring the list or the pill bottles to follow-up visits   Carry your medicine list with you in case of an emergency  Call 911 for any of the following:   · You have severe chest pain and sudden trouble breathing  Contact your healthcare provider if:   · You have a fever  · You have nausea, vomiting, or diarrhea  · Your pain does not go away, or it gets worse, even after treatment  · You have numbness in your legs or toes  · You have heavy or unusual vaginal bleeding  · You have questions or concerns about your condition or care  Manage your pelvic pain:   · Keep a pain record  Write down when your pain happens and how severe it is  Include any other symptoms you have with your pain  A record will help you keep track of pain cycles  Bring the record with you to your follow-up visits  It may also help your healthcare provider find out what is causing your pain  · Learn ways to relax  Deep breathing, meditation, and relaxation techniques can help decrease your pain  When you are tense, your pain may increase  · Treat or prevent constipation  Drink liquids as directed  You may need to drink more liquid than usual  Ask your healthcare provider how much liquid to drink each day  Eat high-fiber foods  High-fiber foods include fruit, vegetables, whole-grain breads and cereals, and beans  You may need to change the foods you eat if you have irritable bowel syndrome  Follow up with your healthcare provider as directed: You may need physical therapy  You may need to see an orthopedic specialist  Write down your questions so you remember to ask them during your visits  © 2017 [employer] Information is for End User's use only and may not be sold, redistributed or otherwise used for commercial purposes  All illustrations and images included in CareNotes® are the copyrighted property of [employer]  or [employer]  The above information is an  only  It is not intended as medical advice for individual conditions or treatments   Talk to your doctor, nurse or pharmacist before following any medical regimen to see if it is safe and effective for you

## 2018-05-15 LAB
BACTERIA UR CULT: ABNORMAL
BACTERIA UR CULT: ABNORMAL

## 2018-05-18 ENCOUNTER — OFFICE VISIT (OUTPATIENT)
Dept: OBGYN CLINIC | Facility: CLINIC | Age: 43
End: 2018-05-18
Payer: COMMERCIAL

## 2018-05-18 VITALS
HEIGHT: 60 IN | SYSTOLIC BLOOD PRESSURE: 132 MMHG | WEIGHT: 179 LBS | DIASTOLIC BLOOD PRESSURE: 70 MMHG | BODY MASS INDEX: 35.14 KG/M2

## 2018-05-18 DIAGNOSIS — R10.31 RIGHT LOWER QUADRANT ABDOMINAL PAIN: Primary | ICD-10-CM

## 2018-05-18 PROCEDURE — 99213 OFFICE O/P EST LOW 20 MIN: CPT | Performed by: OBSTETRICS & GYNECOLOGY

## 2018-05-18 NOTE — PROGRESS NOTES
Jacob Wilcox is a 44 y o  female  here for a problem visit  Patient seen in emergency room 4 days prior for severe right lower quadrant pain that woke her out of sleep  Reviewed labs imaging findings and symptoms  Patient reports symptoms are much improved, voiding and bowel without difficulty, cycle had been a few weeks prior and her cycles are very light now  Discussed that there was some blood in her urine and there is a possibility that she passed a stone  Patient fully counseled regarding pelvic congestion syndrome, this was seen on the left side of the pelvis  Fully counseled and reviewed  Only possible symptom patient reports is some occasional bloating  Personal health questionnaire reviewed: yes  Gynecologic History  Patient's last menstrual period was 2018 (exact date)  Contraception: tubal ligation  Last Pap:  2018  Results were: normal  Last mammogram:  2018  Results were: With additional imaging normal     Obstetric History  OB History    Para Term  AB Living   6         3   SAB TAB Ectopic Multiple Live Births           3      # Outcome Date GA Lbr Mathew/2nd Weight Sex Delivery Anes PTL Lv   6  04    F Vag-Spont   SILVESTRE   5  02    M CS-Unspec   SILVESTRE   4  98    F Vag-Spont   SILVESTRE      Complications: Pre-eclampsia   3      U    FD   2      U    FD   1      U    FD            The following portions of the patient's history were reviewed and updated as appropriate: allergies, current medications, past family history, past medical history, past social history, past surgical history and problem list     Review of Systems  Review of Systems   Constitutional: Negative for chills, fatigue, fever and unexpected weight change  HENT: Negative for dental problem, sinus pain and sinus pressure  Eyes: Negative for visual disturbance     Respiratory: Negative for cough, shortness of breath and wheezing  Cardiovascular: Negative for chest pain and leg swelling  Gastrointestinal: Negative for constipation, diarrhea, nausea and vomiting  Genitourinary: Positive for pelvic pain  Negative for menstrual problem and urgency  Musculoskeletal: Negative for back pain  Allergic/Immunologic: Negative for environmental allergies  Neurological: Negative for dizziness and headaches  Objective     /70 (BP Location: Left arm, Patient Position: Sitting, Cuff Size: Large)   Ht 5' (1 524 m)   Wt 81 2 kg (179 lb)   LMP 2018 (Exact Date)   BMI 34 96 kg/m²   General appearance: alert and oriented, in no acute distress  Abdomen: soft, non-tender; bowel sounds normal; no masses,  no organomegaly and mildly tender right lower quadrant      Assessment    44year-old  with right lower quadrant pain that is resolving  Possibly passed a stone  Also with pelvic congestion syndrome on imaging no significant symptoms  Plan  Observe, follow-up as needed, hydrate, return for annual or sooner as needed

## 2018-07-14 ENCOUNTER — APPOINTMENT (EMERGENCY)
Dept: RADIOLOGY | Facility: HOSPITAL | Age: 43
End: 2018-07-14
Payer: COMMERCIAL

## 2018-07-14 ENCOUNTER — HOSPITAL ENCOUNTER (EMERGENCY)
Facility: HOSPITAL | Age: 43
Discharge: HOME | End: 2018-07-14
Admitting: EMERGENCY MEDICINE
Payer: COMMERCIAL

## 2018-07-14 VITALS
TEMPERATURE: 98.7 F | SYSTOLIC BLOOD PRESSURE: 131 MMHG | HEART RATE: 77 BPM | OXYGEN SATURATION: 96 % | DIASTOLIC BLOOD PRESSURE: 66 MMHG | RESPIRATION RATE: 18 BRPM

## 2018-07-14 DIAGNOSIS — S80.00XA KNEE CONTUSION: ICD-10-CM

## 2018-07-14 DIAGNOSIS — S93.409A ANKLE SPRAIN: Primary | ICD-10-CM

## 2018-07-14 PROCEDURE — 73564 X-RAY EXAM KNEE 4 OR MORE: CPT

## 2018-07-14 PROCEDURE — 99283 EMERGENCY DEPT VISIT LOW MDM: CPT

## 2018-07-14 PROCEDURE — 73610 X-RAY EXAM OF ANKLE: CPT

## 2018-07-14 RX ORDER — HYDROCODONE BITARTRATE AND ACETAMINOPHEN 5; 325 MG/1; MG/1
1 TABLET ORAL EVERY 6 HOURS PRN
Qty: 8 TABLET | Refills: 0 | Status: SHIPPED | OUTPATIENT
Start: 2018-07-14 | End: 2019-03-29

## 2018-07-14 RX ORDER — NAPROXEN 500 MG/1
500 TABLET ORAL 2 TIMES DAILY WITH MEALS
Qty: 14 TABLET | Refills: 0 | Status: SHIPPED | OUTPATIENT
Start: 2018-07-14 | End: 2019-03-29

## 2018-07-14 NOTE — ED PROVIDER NOTES
History  Chief Complaint   Patient presents with    Ankle Injury     pt reports falling on uneven pavement earlier today, pt reports left ankle pop and increased pain, right knee and ankle pain, pt took 600mg Ibuprofen PTA and iced and elevated with minimal relief     Patient presents emergency room for evaluation of her ankles  She had an inversion injury to her left ankle subsequently fell and twisted her right ankle  She also injured her right knee  She complains of increasing pain over the lateral aspect of her left ankle  She states that she is able to ambulate but it is quite tender  Her right ankle does not appear to be hurting her as much  She complains of an abrasion over the right knee that she cleaned and dressed herself  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  Her tetanus is up-to-date  She was last immunized in 2017  Patient took Motrin prior to arrival   Past medical history is positive for palpitations and a thyroid nodule  History provided by:  Patient  Ankle Injury   Location:  Both ankles  Quality:  Ache  Timing:  Constant  Progression:  Unchanged  Chronicity:  New  Context:  Inversion injury to both ankles and right knee  c/o increased pain over the lateral aspect  Relieved by:  Motrin  Worsened by:  Ambulation  Associated symptoms: no abdominal pain, no chest pain, no congestion, no cough, no diarrhea, no ear pain, no fatigue, no fever, no headaches, no loss of consciousness, no myalgias, no nausea, no rash, no rhinorrhea, no shortness of breath, no sore throat, no vomiting and no wheezing        Prior to Admission Medications   Prescriptions Last Dose Informant Patient Reported?  Taking?   naproxen sodium (ANAPROX) 550 mg tablet   No No   Sig: Take 1 tablet (550 mg total) by mouth 2 (two) times a day with meals for 10 days   ondansetron (ZOFRAN ODT) 4 mg disintegrating tablet   No No   Sig: Take 1 tablet (4 mg total) by mouth every 8 (eight) hours as needed for nausea or vomiting for up to 5 days      Facility-Administered Medications: None       Past Medical History:   Diagnosis Date    Palpitations     Thyroid nodule     Last Assessed: 2014        Past Surgical History:   Procedure Laterality Date    BREAST BIOPSY Right     Percutaneous Needle Core Right      SECTION      CHOLECYSTECTOMY      Laparoscopic     TUBAL LIGATION      WISDOM TOOTH EXTRACTION         Family History   Problem Relation Age of Onset    Hypertension Mother     Heart disease Mother     Nephrolithiasis Mother     Hypertension Maternal Grandmother     Diabetes Maternal Grandmother     Stroke Maternal Grandmother         Stroke Syndrome     Other Maternal Grandfather         CABG     Stroke Maternal Grandfather         Stroke Syndrome     Diabetes Paternal Grandmother     Cancer Paternal Grandmother     Hyperthyroidism Family     Hypothyroidism Family     Hodgkin's lymphoma Paternal Uncle      I have reviewed and agree with the history as documented  Social History   Substance Use Topics    Smoking status: Never Smoker    Smokeless tobacco: Never Used    Alcohol use Yes      Comment: social        Review of Systems   Constitutional: Positive for activity change  Negative for fatigue and fever  HENT: Negative for congestion, ear pain, rhinorrhea and sore throat  Respiratory: Negative for cough, shortness of breath and wheezing  Cardiovascular: Negative for chest pain  Gastrointestinal: Negative for abdominal pain, diarrhea, nausea and vomiting  Musculoskeletal: Positive for arthralgias, gait problem and joint swelling  Negative for myalgias  Skin: Positive for color change and wound  Negative for rash  Neurological: Negative for loss of consciousness and headaches  Psychiatric/Behavioral: Negative for confusion  All other systems reviewed and are negative        Physical Exam  Physical Exam   Constitutional: She is oriented to person, place, and time  She appears well-developed and well-nourished  No distress  HENT:   Head: Normocephalic  Right Ear: External ear normal    Left Ear: External ear normal    Nose: Nose normal    Mouth/Throat: Oropharynx is clear and moist    Eyes: Conjunctivae are normal  Right eye exhibits no discharge  Left eye exhibits no discharge  Pulmonary/Chest: Effort normal    Musculoskeletal:   Examination of the left ankle-there is marked swelling present over the lateral aspect of the ankle  There is tenderness palpated over the distal fibula  The ankle and foot are aligned  The remainder of the foot lower leg and knee are nontender with good range of motion  Upon inspection of the right ankle  There is minimal swelling present over the lateral aspect of the ankle  There is generalized tenderness present  There is no point bony tenderness  The remainder of the foot ankle and lower leg are nontender with good range of motion  Inspection of the right knee-there is a healing abrasion present over the anterior aspect  There is a dry Band-Aid dressing present  There is no effusion  There is good range of motion with no specific bony tenderness  Neurological: She is alert and oriented to person, place, and time  Skin: Skin is warm  Capillary refill takes less than 2 seconds  She is not diaphoretic  Abrasion right anterior knee  Good ROM  No effusion  Psychiatric: She has a normal mood and affect  Her behavior is normal  Judgment and thought content normal    Nursing note and vitals reviewed        Vital Signs  ED Triage Vitals [07/14/18 1514]   Temperature Pulse Respirations Blood Pressure SpO2   98 7 °F (37 1 °C) 77 18 131/66 96 %      Temp Source Heart Rate Source Patient Position - Orthostatic VS BP Location FiO2 (%)   Oral Monitor Sitting Right arm --      Pain Score       7           Vitals:    07/14/18 1514   BP: 131/66   Pulse: 77   Patient Position - Orthostatic VS: Sitting       Visual Acuity      ED Medications  Medications - No data to display    Diagnostic Studies  Results Reviewed     None                 XR knee 4+ views RIGHT   ED Interpretation by Aj Card PA-C (07/14 4243)   No fracture or other bony abnormality      XR ankle 3+ views RIGHT   ED Interpretation by Aj Card PA-C (07/14 7812)   No fracture      XR ankle 3+ views LEFT   ED Interpretation by Aj Card PA-C (07/14 5521)   No fracture                 Procedures  Procedures       Phone Contacts  ED Phone Contact    ED Course                               MDM  Number of Diagnoses or Management Options  Ankle sprain: new and requires workup  Knee contusion: new and requires workup     Amount and/or Complexity of Data Reviewed  Tests in the radiology section of CPT®: ordered and reviewed  Tests in the medicine section of CPT®: ordered and reviewed    Risk of Complications, Morbidity, and/or Mortality  Presenting problems: moderate  Diagnostic procedures: moderate  Management options: moderate  General comments: Patient presents emergency room after twisting both ankles and injuring her right knee  She was seen and evaluated  X-rays were obtained which  were within normal limits  She was given an air cast for ambulation  She refer you stay crutches  She was given a prescription for Naprosyn as well as Vicodin for pain  She was given a referral to Sports Medicine for further evaluation  Patient Progress  Patient progress: stable    CritCare Time    Disposition  Final diagnoses:    Ankle sprain - Bilateral   Knee contusion - Acute right knee contusion/abrasion     Time reflects when diagnosis was documented in both MDM as applicable and the Disposition within this note     Time User Action Codes Description Comment    7/14/2018  3:54 PM Kayla Cline Add [Q53 246H] Ankle sprain     7/14/2018  3:54 PM Kayla Cline Modify [S93 409A] Ankle sprain Bilateral    7/14/2018  3:54 PM Jeronimo Ospina [S80 00XA] Knee contusion     7/14/2018  3:55 PM Florentino Johnson Modify [S80 00XA] Knee contusion Acute right knee contusion/abrasion      ED Disposition     ED Disposition Condition Comment    Discharge  Eleuterio Stubbs discharge to home/self care  Condition at discharge: Good        Follow-up Information     Follow up With Specialties Details Why Bowen Soler MD Sports Medicine, Orthopedic Surgery Schedule an appointment as soon as possible for a visit in 2 days  [address] [place] [address]  [address] [address]  [phone]            Discharge Medication List as of 7/14/2018  3:58 PM      START taking these medications    Details   HYDROcodone-acetaminophen (NORCO) 5-325 mg per tablet Take 1 tablet by mouth every 6 (six) hours as needed for pain for up to 8 doses Max Daily Amount: 4 tablets, Starting Sat 7/14/2018, Print      naproxen (NAPROSYN) 500 mg tablet Take 1 tablet (500 mg total) by mouth 2 (two) times a day with meals, Starting Sat 7/14/2018, Print         CONTINUE these medications which have NOT CHANGED    Details   naproxen sodium (ANAPROX) 550 mg tablet Take 1 tablet (550 mg total) by mouth 2 (two) times a day with meals for 10 days, Starting Mon 5/14/2018, Until Thu 5/24/2018, Print      ondansetron (ZOFRAN ODT) 4 mg disintegrating tablet Take 1 tablet (4 mg total) by mouth every 8 (eight) hours as needed for nausea or vomiting for up to 5 days, Starting Mon 5/14/2018, Until Sat 5/19/2018, Print           No discharge procedures on file      ED Provider  Electronically Signed by           Clifford Flores PA-C  07/14/18 7762

## 2018-07-14 NOTE — DISCHARGE INSTRUCTIONS
Ankle Sprain   WHAT YOU NEED TO KNOW:   An ankle sprain happens when 1 or more ligaments in your ankle joint stretch or tear  Ligaments are tough tissues that connect bones  Ligaments support your joints and keep your bones in place  DISCHARGE INSTRUCTIONS:   Return to the emergency department if:   · You have severe pain in your ankle  · Your foot or toes are cold or numb  · Your ankle becomes more weak or unstable (wobbly)  · You are unable to put any weight on your ankle or foot  · Your swelling has increased or returned  Contact your healthcare provider if:   · Your pain does not go away, even after treatment  · You have questions or concerns about your condition or care  Medicines: You may need any of the following:  · NSAIDs , such as ibuprofen, help decrease swelling, pain, and fever  This medicine is available with or without a doctor's order  NSAIDs can cause stomach bleeding or kidney problems in certain people  If you take blood thinner medicine, always ask your healthcare provider if NSAIDs are safe for you  Always read the medicine label and follow directions  · Acetaminophen  decreases pain  It is available without a doctor's order  Ask how much to take and how often to take it  Follow directions  Acetaminophen can cause liver damage if not taken correctly  · Prescription pain medicine  may be given  Ask how to take this medicine safely  · Take your medicine as directed  Contact your healthcare provider if you think your medicine is not helping or if you have side effects  Tell him or her if you are allergic to any medicine  Keep a list of the medicines, vitamins, and herbs you take  Include the amounts, and when and why you take them  Bring the list or the pill bottles to follow-up visits  Carry your medicine list with you in case of an emergency    Self care:   · Use support devices,  such as a brace, cast, or splint, may be needed to limit your movement and protect your joint  You may need to use crutches to decrease your pain as you move around  · Go to physical therapy as directed  A physical therapist teaches you exercises to help improve movement and strength, and to decrease pain  · Rest  your ankle so that it can heal  Return to normal activities as directed  · Apply ice on your ankle for 15 to 20 minutes every hour or as directed  Use an ice pack, or put crushed ice in a plastic bag  Cover it with a towel  Ice helps prevent tissue damage and decreases swelling and pain  · Compress  your ankle  Ask if you should wrap an elastic bandage around your injured ligament  An elastic bandage provides support and helps decrease swelling and movement so your joint can heal  Wear as long as directed  · Elevate  your ankle above the level of your heart as often as you can  This will help decrease swelling and pain  Prop your ankle on pillows or blankets to keep it elevated comfortably  Prevent another ankle sprain:   · Let your ankle heal   Find out how long your ligament needs to heal  Do not do any physical activity until your healthcare provider says it is okay  If you start activity too soon, you may develop a more serious injury  · Always warm up and stretch  before you exercise or play sports  · Use the right equipment  Always wear shoes that fit well and are made for the activity that you are doing  You may also need ankle supports, elbow and knee pads, or braces  Follow up with your healthcare provider as directed:  Write down your questions so you remember to ask them during your visits  © 2017 [employer] Information is for End User's use only and may not be sold, redistributed or otherwise used for commercial purposes  All illustrations and images included in CareNotes® are the copyrighted property of [employer]  or [employer]  The above information is an  only   It is not intended as medical advice for individual conditions or treatments  Talk to your doctor, nurse or pharmacist before following any medical regimen to see if it is safe and effective for you  Contusion in Adults   WHAT YOU NEED TO KNOW:   A contusion is a bruise that appears on your skin after an injury  A bruise happens when small blood vessels tear but skin does not  When blood vessels tear, blood leaks into nearby tissue, such as soft tissue or muscle  DISCHARGE INSTRUCTIONS:   Return to the emergency department if:   · You have new trouble moving the injured area  · You have tingling or numbness in or near the injured area  · Your hand or foot below the bruise gets cold or turns pale  Contact your healthcare provider if:   · You find a new lump in the injured area  · Your symptoms do not improve with treatment after 4 to 5 days  · You have questions or concerns about your condition or care  Medicines: You may need any of the following:  · NSAIDs  help decrease swelling and pain or fever  This medicine is available with or without a doctor's order  NSAIDs can cause stomach bleeding or kidney problems in certain people  If you take blood thinner medicine, always ask your healthcare provider if NSAIDs are safe for you  Always read the medicine label and follow directions  · Prescription pain medicine  may be given  Do not wait until the pain is severe before you take your medicine  · Take your medicine as directed  Contact your healthcare provider if you think your medicine is not helping or if you have side effects  Tell him of her if you are allergic to any medicine  Keep a list of the medicines, vitamins, and herbs you take  Include the amounts, and when and why you take them  Bring the list or the pill bottles to follow-up visits  Carry your medicine list with you in case of an emergency  Follow up with your healthcare provider as directed: You may need to return within a week to check your injury again  Write down your questions so you remember to ask them during your visits  Help a contusion heal:   · Rest the injured area  or use it less than usual  If you bruised your leg or foot, you may need crutches or a cane to help you walk  This will help you keep weight off your injured body part  · Apply ice  to decrease swelling and pain  Ice may also help prevent tissue damage  Use an ice pack, or put crushed ice in a plastic bag  Cover it with a towel and place it on your bruise for 15 to 20 minutes every hour or as directed  · Use compression  to support the area and decrease swelling  Wrap an elastic bandage around the area over the bruised muscle  Make sure the bandage is not too tight  You should be able to fit 1 finger between the bandage and your skin  · Elevate (raise) your injured body part  above the level of your heart to help decrease pain and swelling  Use pillows, blankets, or rolled towels to elevate the area as often as you can  · Do not drink alcohol  as directed  Alcohol may slow healing  · Do not stretch injured muscles  right after your injury  Ask your healthcare provider when and how you may safely stretch after your injury  Gentle stretches can help increase your flexibility  · Do not massage the area or put heating pads  on the bruise right after your injury  Heat and massage may slow healing  Your healthcare provider may tell you to apply heat after several days  At that time, heat will start to help the injury heal   Prevent another contusion:   · Stretch and warm up before you play sports or exercise  · Wear protective gear when you play sports  Examples are shin guards and padding  · If you begin a new physical activity, start slowly to give your body a chance to adjust   © 2017 [employer] Information is for End User's use only and may not be sold, redistributed or otherwise used for commercial purposes   All illustrations and images included in CareNotes® are the copyrighted property of [employer]  or [employer]  The above information is an  only  It is not intended as medical advice for individual conditions or treatments  Talk to your doctor, nurse or pharmacist before following any medical regimen to see if it is safe and effective for you

## 2018-08-24 ENCOUNTER — TRANSCRIBE ORDERS (OUTPATIENT)
Dept: LAB | Facility: CLINIC | Age: 43
End: 2018-08-24

## 2018-08-24 ENCOUNTER — APPOINTMENT (OUTPATIENT)
Dept: LAB | Facility: CLINIC | Age: 43
End: 2018-08-24

## 2018-08-24 DIAGNOSIS — Z00.8 HEALTH EXAMINATION IN POPULATION SURVEY: ICD-10-CM

## 2018-08-24 DIAGNOSIS — Z00.8 HEALTH EXAMINATION IN POPULATION SURVEY: Primary | ICD-10-CM

## 2018-08-24 LAB
CHOLEST SERPL-MCNC: 155 MG/DL (ref 50–200)
HDLC SERPL-MCNC: 54 MG/DL (ref 40–60)
LDLC SERPL CALC-MCNC: 90 MG/DL (ref 0–100)
NONHDLC SERPL-MCNC: 101 MG/DL
TRIGL SERPL-MCNC: 53 MG/DL

## 2018-08-24 PROCEDURE — 80061 LIPID PANEL: CPT

## 2018-08-24 PROCEDURE — 36415 COLL VENOUS BLD VENIPUNCTURE: CPT

## 2018-08-24 PROCEDURE — 83036 HEMOGLOBIN GLYCOSYLATED A1C: CPT

## 2018-08-25 LAB
EST. AVERAGE GLUCOSE BLD GHB EST-MCNC: 108 MG/DL
HBA1C MFR BLD: 5.4 % (ref 4.2–6.3)

## 2018-10-05 DIAGNOSIS — R92.8 ABNORMAL MAMMOGRAM: Primary | ICD-10-CM

## 2018-10-25 ENCOUNTER — OFFICE VISIT (OUTPATIENT)
Dept: SURGICAL ONCOLOGY | Facility: CLINIC | Age: 43
End: 2018-10-25
Payer: COMMERCIAL

## 2018-10-25 VITALS
WEIGHT: 177 LBS | HEART RATE: 85 BPM | DIASTOLIC BLOOD PRESSURE: 86 MMHG | RESPIRATION RATE: 16 BRPM | SYSTOLIC BLOOD PRESSURE: 140 MMHG | HEIGHT: 60 IN | TEMPERATURE: 98.7 F | BODY MASS INDEX: 34.75 KG/M2

## 2018-10-25 DIAGNOSIS — Z12.39 BREAST CANCER SCREENING OTHER THAN MAMMOGRAM: Primary | ICD-10-CM

## 2018-10-25 DIAGNOSIS — R92.2 DENSE BREAST TISSUE: ICD-10-CM

## 2018-10-25 DIAGNOSIS — Z12.31 SCREENING MAMMOGRAM, ENCOUNTER FOR: ICD-10-CM

## 2018-10-25 DIAGNOSIS — R92.8 ABNORMAL MAMMOGRAM: ICD-10-CM

## 2018-10-25 DIAGNOSIS — Z80.3 FAMILY HISTORY OF BREAST CANCER: ICD-10-CM

## 2018-10-25 PROBLEM — R92.30 DENSE BREAST TISSUE: Status: ACTIVE | Noted: 2018-10-25

## 2018-10-25 PROCEDURE — 99243 OFF/OP CNSLTJ NEW/EST LOW 30: CPT | Performed by: SURGERY

## 2018-10-25 NOTE — LETTER
2018     Maranda Dakin, MD  [address]    Patient: Saumya Odell   YOB: 1975   Date of Visit: 10/25/2018       Dear Dr Marie Samuels: Thank you for referring Saumya Odell to me for evaluation  Below are my notes for this consultation  If you have questions, please do not hesitate to call me  I look forward to following your patient along with you  Sincerely,        Nguyen Hopper MD        CC: No Recipients  Nguyen Hopper MD  10/25/2018 11:10 AM  Sign at close encounter    Breast Consultation-Surgical Oncology     [address] [place] [address]    Name:  Saumya Odell  YOB: 1975  MRN:  [id number]    Assessment/Plan   Diagnoses and all orders for this visit:    Breast cancer screening other than mammogram  -     US breast screening bilateral complete (ABUS); Future    Abnormal mammogram  -     Ambulatory referral to General Surgery    Dense breast tissue  -     US breast screening bilateral complete (ABUS); Future    Screening mammogram, encounter for  -     Mammo screening bilateral w 3d & cad; Future    Family history of breast cancer        HPI: Saumya Odell is a 44y o  year old female who presents with concerns regarding abnormal breast imaging  Pt denies any breast lumps, nipple drainage or skin changes  She reports family history of breast cancer and a paternal 1st cousin the age of 52; this particular cousin did have genetic testing and will tested negative for any mutations  Surgical treatment to date consisted of n/a  Oncology History:     No history exists  Pertinent reproductive history:  Age at menarche:  7  OB History      Para Term  AB Living    7         3    SAB TAB Ectopic Multiple Live Births            3        Obstetric Comments    Menarche at age 7  First pregnancy age 26  Hx of BCP and Depoprovera use           Age at first live birth:  26  Age at menopause:  n/a  Hysterectomy/Oophrectomy:  No  Hormone replacement therapy:  No  Birth control pills:  Yes    Problem List:   Patient Active Problem List   Diagnosis    Dense breast tissue    Breast cancer screening other than mammogram    Abnormal mammogram    Screening mammogram, encounter for    Family history of breast cancer     Past Medical History:   Diagnosis Date    Palpitations     Thyroid nodule     Last Assessed: 2014      Past Surgical History:   Procedure Laterality Date    BREAST BIOPSY Right     Percutaneous Needle Core Right      SECTION      CHOLECYSTECTOMY      Laparoscopic     ENDOMETRIAL ABLATION  2012    TUBAL LIGATION      WISDOM TOOTH EXTRACTION       Family History   Problem Relation Age of Onset    Hypertension Mother     Heart disease Mother     Nephrolithiasis Mother     Hypertension Maternal Grandmother     Diabetes Maternal Grandmother     Stroke Maternal Grandmother         Stroke Syndrome     Other Maternal Grandfather         CABG     Stroke Maternal Grandfather         Stroke Syndrome     Prostate cancer Maternal Grandfather 61    Diabetes Paternal Grandmother     Cancer Paternal Grandmother 79        Vulvar     Hyperthyroidism Family     Hypothyroidism Family     Lymphoma Maternal Uncle 36    Lymphoma Paternal Aunt 46        non hodgkins follicular lymphoma    Breast cancer Cousin 47        bilateral , mets to abd/pelvis  Genetic testing negative     Social History     Social History    Marital status: /Civil Union     Spouse name: N/A    Number of children: 3    Years of education: Nursing School      Occupational History    Not on file       Social History Main Topics    Smoking status: Never Smoker    Smokeless tobacco: Never Used    Alcohol use Yes      Comment: social    Drug use: No    Sexual activity: Yes     Partners: Male     Birth control/ protection: Female Sterilization Comment: declines STD HIV testing     Other Topics Concern    Not on file     Social History Narrative    No narrative on file     Current Outpatient Prescriptions   Medication Sig Dispense Refill    HYDROcodone-acetaminophen (NORCO) 5-325 mg per tablet Take 1 tablet by mouth every 6 (six) hours as needed for pain for up to 8 doses Max Daily Amount: 4 tablets (Patient not taking: Reported on 10/25/2018 ) 8 tablet 0    naproxen (NAPROSYN) 500 mg tablet Take 1 tablet (500 mg total) by mouth 2 (two) times a day with meals (Patient not taking: Reported on 10/25/2018 ) 14 tablet 0    naproxen sodium (ANAPROX) 550 mg tablet Take 1 tablet (550 mg total) by mouth 2 (two) times a day with meals for 10 days 20 tablet 0    ondansetron (ZOFRAN ODT) 4 mg disintegrating tablet Take 1 tablet (4 mg total) by mouth every 8 (eight) hours as needed for nausea or vomiting for up to 5 days 15 tablet 0     No current facility-administered medications for this visit  No Known Allergies      The following portions of the patient's history were reviewed and updated as appropriate: allergies, current medications, past family history, past medical history, past social history, past surgical history and problem list     Review of Systems:  Review of Systems   Constitutional: Negative  Negative for appetite change and fever  Eyes: Negative  Respiratory: Negative for shortness of breath  Cardiovascular: Negative  Gastrointestinal: Negative  Endocrine: Negative  Genitourinary: Negative  Musculoskeletal: Negative  Negative for arthralgias and myalgias  Skin: Negative  Allergic/Immunologic: Negative  Neurological: Negative  Hematological: Negative  Negative for adenopathy  Does not bruise/bleed easily  Psychiatric/Behavioral: Negative  Physical Exam:  Physical Exam   Constitutional: She is oriented to person, place, and time  She appears well-developed and well-nourished     HENT:   Head: Normocephalic and atraumatic  Pulmonary/Chest: Right breast exhibits no inverted nipple, no mass, no nipple discharge, no skin change and no tenderness  Left breast exhibits no inverted nipple, no mass, no nipple discharge, no skin change and no tenderness  Lymphadenopathy:        Right axillary: No pectoral and no lateral adenopathy present  Left axillary: No pectoral and no lateral adenopathy present  Right: No supraclavicular adenopathy present  Left: No supraclavicular adenopathy present  Neurological: She is alert and oriented to person, place, and time  Psychiatric: She has a normal mood and affect  [id number]  3D bilateral screening mammogram, B0 (3)  18  3D bilateral diagnostic mammogram/US  B3 (4) bilateral breast cyst with a complex cyst on the left side which is six month follow-up is recommended          Discussion/Summary:  76-year-old female with dense breast tissue, fibrocystic changes and family history  There are no concerns on examination today  She is scheduled for the follow-up left breast ultrasound next month  I am recommending that she also start screening with automated breast ultrasound in addition to the mammography  I will make these arrangements for her  She will be due for another mammogram in the spring  I also want to see her for clinical exam once a year

## 2018-10-25 NOTE — PROGRESS NOTES
Breast Consultation-Surgical Oncology     [facility]  [facility] [address] [address]    Name:  Guevara Jaramillo  YOB: 1975  MRN:  [id number]    Assessment/Plan   Diagnoses and all orders for this visit:    Breast cancer screening other than mammogram  -     US breast screening bilateral complete (ABUS); Future    Abnormal mammogram  -     Ambulatory referral to General Surgery    Dense breast tissue  -     US breast screening bilateral complete (ABUS); Future    Screening mammogram, encounter for  -     Mammo screening bilateral w 3d & cad; Future    Family history of breast cancer        HPI: Guevara Jaramillo is a 44y o  year old female who presents with concerns regarding abnormal breast imaging  Pt denies any breast lumps, nipple drainage or skin changes  She reports family history of breast cancer and a paternal 1st cousin the age of 52; this particular cousin did have genetic testing and will tested negative for any mutations  Surgical treatment to date consisted of n/a  Oncology History:     No history exists  Pertinent reproductive history:  Age at menarche:  7  OB History      Para Term  AB Living    7         3    SAB TAB Ectopic Multiple Live Births            3        Obstetric Comments    Menarche at age 7  First pregnancy age 26  Hx of BCP and Depoprovera use           Age at first live birth:  26  Age at menopause:  n/a  Hysterectomy/Oophrectomy:  No  Hormone replacement therapy:  No  Birth control pills:  Yes    Problem List:   Patient Active Problem List   Diagnosis    Dense breast tissue    Breast cancer screening other than mammogram    Abnormal mammogram    Screening mammogram, encounter for    Family history of breast cancer     Past Medical History:   Diagnosis Date    Palpitations     Thyroid nodule     Last Assessed: 2014      Past Surgical History:   Procedure Laterality Date    BREAST BIOPSY Right     Percutaneous Needle Core Right      SECTION      CHOLECYSTECTOMY      Laparoscopic     ENDOMETRIAL ABLATION  2012    TUBAL LIGATION      WISDOM TOOTH EXTRACTION       Family History   Problem Relation Age of Onset    Hypertension Mother     Heart disease Mother     Nephrolithiasis Mother     Hypertension Maternal Grandmother     Diabetes Maternal Grandmother     Stroke Maternal Grandmother         Stroke Syndrome     Other Maternal Grandfather         CABG     Stroke Maternal Grandfather         Stroke Syndrome     Prostate cancer Maternal Grandfather 61    Diabetes Paternal Grandmother     Cancer Paternal Grandmother 79        Vulvar     Hyperthyroidism Family     Hypothyroidism Family     Lymphoma Maternal Uncle 36    Lymphoma Paternal Aunt 46        non hodgkins follicular lymphoma    Breast cancer Cousin 47        bilateral , mets to abd/pelvis  Genetic testing negative     Social History     Social History    Marital status: /Civil Union     Spouse name: N/A    Number of children: 3    Years of education: Nursing School      Occupational History    Not on file       Social History Main Topics    Smoking status: Never Smoker    Smokeless tobacco: Never Used    Alcohol use Yes      Comment: social    Drug use: No    Sexual activity: Yes     Partners: Male     Birth control/ protection: Female Sterilization      Comment: declines STD HIV testing     Other Topics Concern    Not on file     Social History Narrative    No narrative on file     Current Outpatient Prescriptions   Medication Sig Dispense Refill    HYDROcodone-acetaminophen (NORCO) 5-325 mg per tablet Take 1 tablet by mouth every 6 (six) hours as needed for pain for up to 8 doses Max Daily Amount: 4 tablets (Patient not taking: Reported on 10/25/2018 ) 8 tablet 0    naproxen (NAPROSYN) 500 mg tablet Take 1 tablet (500 mg total) by mouth 2 (two) times a day with meals (Patient not taking: Reported on 10/25/2018 ) 14 tablet 0    naproxen sodium (ANAPROX) 550 mg tablet Take 1 tablet (550 mg total) by mouth 2 (two) times a day with meals for 10 days 20 tablet 0    ondansetron (ZOFRAN ODT) 4 mg disintegrating tablet Take 1 tablet (4 mg total) by mouth every 8 (eight) hours as needed for nausea or vomiting for up to 5 days 15 tablet 0     No current facility-administered medications for this visit  No Known Allergies      The following portions of the patient's history were reviewed and updated as appropriate: allergies, current medications, past family history, past medical history, past social history, past surgical history and problem list     Review of Systems:  Review of Systems   Constitutional: Negative  Negative for appetite change and fever  Eyes: Negative  Respiratory: Negative for shortness of breath  Cardiovascular: Negative  Gastrointestinal: Negative  Endocrine: Negative  Genitourinary: Negative  Musculoskeletal: Negative  Negative for arthralgias and myalgias  Skin: Negative  Allergic/Immunologic: Negative  Neurological: Negative  Hematological: Negative  Negative for adenopathy  Does not bruise/bleed easily  Psychiatric/Behavioral: Negative  Physical Exam:  Physical Exam   Constitutional: She is oriented to person, place, and time  She appears well-developed and well-nourished  HENT:   Head: Normocephalic and atraumatic  Pulmonary/Chest: Right breast exhibits no inverted nipple, no mass, no nipple discharge, no skin change and no tenderness  Left breast exhibits no inverted nipple, no mass, no nipple discharge, no skin change and no tenderness  Lymphadenopathy:        Right axillary: No pectoral and no lateral adenopathy present  Left axillary: No pectoral and no lateral adenopathy present  Right: No supraclavicular adenopathy present  Left: No supraclavicular adenopathy present     Neurological: She is alert and oriented to person, place, and time  Psychiatric: She has a normal mood and affect  [id number]  3D bilateral screening mammogram, B0 (3)  18  3D bilateral diagnostic mammogram/US  B3 (4) bilateral breast cyst with a complex cyst on the left side which is six month follow-up is recommended          Discussion/Summary:  50-year-old female with dense breast tissue, fibrocystic changes and family history  There are no concerns on examination today  She is scheduled for the follow-up left breast ultrasound next month  I am recommending that she also start screening with automated breast ultrasound in addition to the mammography  I will make these arrangements for her  She will be due for another mammogram in the spring  I also want to see her for clinical exam once a year

## 2018-11-23 ENCOUNTER — HOSPITAL ENCOUNTER (OUTPATIENT)
Dept: ULTRASOUND IMAGING | Facility: CLINIC | Age: 43
Discharge: HOME | End: 2018-11-23
Payer: COMMERCIAL

## 2018-11-23 DIAGNOSIS — R92.2 DENSE BREAST TISSUE: ICD-10-CM

## 2018-11-23 DIAGNOSIS — R92.8 ABNORMAL MAMMOGRAM: ICD-10-CM

## 2018-11-23 DIAGNOSIS — Z12.39 BREAST CANCER SCREENING OTHER THAN MAMMOGRAM: ICD-10-CM

## 2018-11-23 PROCEDURE — 76641 ULTRASOUND BREAST COMPLETE: CPT

## 2018-11-23 PROCEDURE — 76642 ULTRASOUND BREAST LIMITED: CPT

## 2018-11-23 PROCEDURE — 76377 3D RENDER W/INTRP POSTPROCES: CPT

## 2019-03-20 DIAGNOSIS — N39.0 URINARY TRACT INFECTION WITHOUT HEMATURIA, SITE UNSPECIFIED: Primary | ICD-10-CM

## 2019-03-20 RX ORDER — NITROFURANTOIN 25; 75 MG/1; MG/1
100 CAPSULE ORAL 2 TIMES DAILY
Qty: 14 CAPSULE | Refills: 2 | Status: SHIPPED | OUTPATIENT
Start: 2019-03-20 | End: 2019-03-29

## 2019-03-20 RX ORDER — PHENAZOPYRIDINE HYDROCHLORIDE 100 MG/1
100 TABLET, FILM COATED ORAL 3 TIMES DAILY PRN
Qty: 6 TABLET | Refills: 2 | Status: SHIPPED | OUTPATIENT
Start: 2019-03-20 | End: 2019-03-29

## 2019-03-29 ENCOUNTER — TRANSCRIBE ORDERS (OUTPATIENT)
Dept: LAB | Facility: CLINIC | Age: 44
End: 2019-03-29

## 2019-03-29 ENCOUNTER — LAB (OUTPATIENT)
Dept: LAB | Facility: CLINIC | Age: 44
End: 2019-03-29
Payer: COMMERCIAL

## 2019-03-29 ENCOUNTER — OFFICE VISIT (OUTPATIENT)
Dept: FAMILY MEDICINE CLINIC | Facility: CLINIC | Age: 44
End: 2019-03-29
Payer: COMMERCIAL

## 2019-03-29 VITALS
RESPIRATION RATE: 18 BRPM | BODY MASS INDEX: 35.39 KG/M2 | HEART RATE: 73 BPM | HEIGHT: 60 IN | DIASTOLIC BLOOD PRESSURE: 98 MMHG | TEMPERATURE: 96.1 F | WEIGHT: 180.25 LBS | SYSTOLIC BLOOD PRESSURE: 140 MMHG | OXYGEN SATURATION: 99 %

## 2019-03-29 DIAGNOSIS — E55.9 VITAMIN D DEFICIENCY: ICD-10-CM

## 2019-03-29 DIAGNOSIS — R63.5 WEIGHT GAIN: ICD-10-CM

## 2019-03-29 DIAGNOSIS — E04.1 THYROID NODULE: ICD-10-CM

## 2019-03-29 DIAGNOSIS — E66.9 OBESITY, CLASS II, BMI 35-39.9, NO COMORBIDITY: ICD-10-CM

## 2019-03-29 DIAGNOSIS — Z00.00 WELL ADULT EXAM: Primary | ICD-10-CM

## 2019-03-29 LAB
25(OH)D3 SERPL-MCNC: 18.7 NG/ML (ref 30–100)
ALBUMIN SERPL BCP-MCNC: 4 G/DL (ref 3.5–5)
ALP SERPL-CCNC: 73 U/L (ref 46–116)
ALT SERPL W P-5'-P-CCNC: 28 U/L (ref 12–78)
ANION GAP SERPL CALCULATED.3IONS-SCNC: 9 MMOL/L (ref 4–13)
AST SERPL W P-5'-P-CCNC: 14 U/L (ref 5–45)
BASOPHILS # BLD AUTO: 0.03 THOUSANDS/ΜL (ref 0–0.1)
BASOPHILS NFR BLD AUTO: 0 % (ref 0–1)
BILIRUB SERPL-MCNC: 0.5 MG/DL (ref 0.2–1)
BUN SERPL-MCNC: 17 MG/DL (ref 5–25)
CALCIUM SERPL-MCNC: 9.2 MG/DL (ref 8.3–10.1)
CHLORIDE SERPL-SCNC: 104 MMOL/L (ref 100–108)
CO2 SERPL-SCNC: 27 MMOL/L (ref 21–32)
CREAT SERPL-MCNC: 0.76 MG/DL (ref 0.6–1.3)
EOSINOPHIL # BLD AUTO: 0.09 THOUSAND/ΜL (ref 0–0.61)
EOSINOPHIL NFR BLD AUTO: 1 % (ref 0–6)
ERYTHROCYTE [DISTWIDTH] IN BLOOD BY AUTOMATED COUNT: 12.8 % (ref 11.6–15.1)
GFR SERPL CREATININE-BSD FRML MDRD: 96 ML/MIN/1.73SQ M
GLUCOSE SERPL-MCNC: 98 MG/DL (ref 65–140)
HCT VFR BLD AUTO: 41.7 % (ref 34.8–46.1)
HGB BLD-MCNC: 14 G/DL (ref 11.5–15.4)
IMM GRANULOCYTES # BLD AUTO: 0.03 THOUSAND/UL (ref 0–0.2)
IMM GRANULOCYTES NFR BLD AUTO: 0 % (ref 0–2)
LYMPHOCYTES # BLD AUTO: 2.31 THOUSANDS/ΜL (ref 0.6–4.47)
LYMPHOCYTES NFR BLD AUTO: 29 % (ref 14–44)
MCH RBC QN AUTO: 29.2 PG (ref 26.8–34.3)
MCHC RBC AUTO-ENTMCNC: 33.6 G/DL (ref 31.4–37.4)
MCV RBC AUTO: 87 FL (ref 82–98)
MONOCYTES # BLD AUTO: 0.38 THOUSAND/ΜL (ref 0.17–1.22)
MONOCYTES NFR BLD AUTO: 5 % (ref 4–12)
NEUTROPHILS # BLD AUTO: 5.09 THOUSANDS/ΜL (ref 1.85–7.62)
NEUTS SEG NFR BLD AUTO: 65 % (ref 43–75)
NRBC BLD AUTO-RTO: 0 /100 WBCS
PLATELET # BLD AUTO: 312 THOUSANDS/UL (ref 149–390)
PMV BLD AUTO: 9.5 FL (ref 8.9–12.7)
POTASSIUM SERPL-SCNC: 3.4 MMOL/L (ref 3.5–5.3)
PROT SERPL-MCNC: 8.2 G/DL (ref 6.4–8.2)
RBC # BLD AUTO: 4.8 MILLION/UL (ref 3.81–5.12)
SODIUM SERPL-SCNC: 140 MMOL/L (ref 136–145)
TSH SERPL DL<=0.05 MIU/L-ACNC: 0.98 UIU/ML (ref 0.36–3.74)
WBC # BLD AUTO: 7.93 THOUSAND/UL (ref 4.31–10.16)

## 2019-03-29 PROCEDURE — 36415 COLL VENOUS BLD VENIPUNCTURE: CPT

## 2019-03-29 PROCEDURE — 80053 COMPREHEN METABOLIC PANEL: CPT

## 2019-03-29 PROCEDURE — 99386 PREV VISIT NEW AGE 40-64: CPT | Performed by: FAMILY MEDICINE

## 2019-03-29 PROCEDURE — 84443 ASSAY THYROID STIM HORMONE: CPT

## 2019-03-29 PROCEDURE — 85025 COMPLETE CBC W/AUTO DIFF WBC: CPT

## 2019-03-29 PROCEDURE — 82306 VITAMIN D 25 HYDROXY: CPT

## 2019-03-29 NOTE — PROGRESS NOTES
Assessment/Plan:     Problem List Items Addressed This Visit        Active Problems    Thyroid nodule     Needs follow up  Last US showed new nodule and she never had follow up  US ordered today along with BW due to weight gain  Relevant Orders    US thyroid    Vitamin D deficiency     Due for updated blood work  Relevant Orders    Vitamin D 25 hydroxy (Completed)    Obesity, Class II, BMI 35-39 9, no comorbidity     Work on diet and exercise for weight loss  Check labs today to sure thyroid not a factor  She is an NP and understands healthy diet, hard to follow  After bw can offer support with dietician if needed  Other Visit Diagnoses     Well adult exam    -  Primary    Weight gain        Relevant Orders    Comprehensive metabolic panel (Completed)    TSH, 3rd generation with Free T4 reflex (Completed)    Vitamin D 25 hydroxy (Completed)    CBC and differential (Completed)          Well adult exam  ·         Continue healthy diet   ·         Encourage exercise 4 times a week or more for minimum 30 minutes - working on weight loss for healthier BMI  ·         Continue to see dentist, wear seatbelt  ·         Health maintenance reviewed and up-to-date    bp elevated today  Watch at home and at office  Work on weight loss for improvement  Follow up one year, sooner if needed  Health Maintenance   Topic Date Due    BMI: Followup Plan  02/08/1994    Depression Screening PHQ  03/29/2020    BMI: Adult  03/29/2020    DTaP,Tdap,and Td Vaccines (3 - Td) 02/17/2027    INFLUENZA VACCINE  Completed    HEPATITIS B VACCINES  Aged Out     No follow-ups on file  There are no Patient Instructions on file for this visit  Subjective:    KORIN Garcia is a 38 y o  female who presents today for a physical      Chief Complaint   Patient presents with   [address]     requestin b/w  order  and TSH  ultrasoud      ---Above per clinical staff & reviewed   ---  new patient transferring from [facility]  shared medical records reviewed  due for physical mammo PHQ   pap & HPV  7/15/15 - f/u 5 yrs   due for thyroid US (new nodule on last)   due for cmp vit D (low)       PHQ-9 Depression Screening    PHQ-9:    Frequency of the following problems over the past two weeks:       Little interest or pleasure in doing things:  0 - not at all  Feeling down, depressed, or hopeless:  0 - not at all  PHQ-2 Score:  0        Concerns today:  Concerned about her weight   Gained weight without her diet   Feels lukas menopausal - hot at night  Had ablation  [de-identified] mom was mid to late 46s  Diet: moderate, watches, can skips meals sometimes  Good portions  Sweet tooth  Exercise:  Dances tues night  (salsa)   Dental visits:  yes  Seatbelt: yes   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     PHQ-9 Depression Screening    PHQ-9:    Frequency of the following problems over the past two weeks:       Little interest or pleasure in doing things:  0 - not at all  Feeling down, depressed, or hopeless:  0 - not at all  PHQ-2 Score:  0            The following portions of the patient's history were reviewed and updated as appropriate: allergies, current medications, past family history, past medical history, past social history, past surgical history and problem list      Current Outpatient Medications   Medication Sig Dispense Refill    naproxen sodium (ANAPROX) 550 mg tablet Take 1 tablet (550 mg total) by mouth 2 (two) times a day with meals for 10 days 20 tablet 0    ondansetron (ZOFRAN ODT) 4 mg disintegrating tablet Take 1 tablet (4 mg total) by mouth every 8 (eight) hours as needed for nausea or vomiting for up to 5 days 15 tablet 0     No current facility-administered medications for this visit  Review of Systems  ROS:  all others negative - no chest pain, SOB, normal urine and bowels  no GERD  sleeping well  mood good      Objective:      /98 (BP Location: Left arm, Patient Position: Sitting, Cuff Size: Large)   Pulse 73   Temp (!) 96 1 °F (35 6 °C) (Tympanic)   Resp 18   Ht 5' (1 524 m)   Wt 81 8 kg (180 lb 4 oz)   SpO2 99%   BMI 35 20 kg/m²     BP Readings from Last 3 Encounters:   03/29/19 140/98   10/25/18 140/86   07/14/18 131/66     Wt Readings from Last 3 Encounters:   03/29/19 81 8 kg (180 lb 4 oz)   10/25/18 80 3 kg (177 lb)   05/18/18 81 2 kg (179 lb)     Physical Exam   Constitutional: she is oriented to person, place, and time  she appears well-developed and well-nourished  HENT: Head: Normocephalic  Right Ear: External ear normal  Tympanic membrane normal    Left Ear: External ear normal  Tympanic membrane normal    Nose: Nose normal  No mucosal edema, No rhinorrhea  Right sinus exhibits no maxillary sinus tenderness  Left sinus exhibits no maxillary sinus tenderness  Mouth/Throat: Oropharynx is clear and moist    Eyes: Normal conjunctiva  No erythema  No discharge  Neck: No pain on exam  Neck supple  Cardiovascular: Normal rate, regular rhythm and normal heart sounds  Pulmonary/Chest: Effort normal and breath sounds normal  No wheezes  No rales  No rhonchi  Abdominal: Soft  Bowel sounds are normal  There is no tenderness  Musculoskeletal: she exhibits no edema  Lymphadenopathy: she has no cervical adenopathy  Neurological: she  is alert and oriented to person, place, and time  Skin: Skin is warm and dry  No rashes  Psychiatric: she  has a normal mood and affect  her behavior is normal  Thought content normal    Vitals reviewed

## 2019-03-31 PROBLEM — Z12.31 SCREENING MAMMOGRAM, ENCOUNTER FOR: Status: RESOLVED | Noted: 2018-10-25 | Resolved: 2019-03-31

## 2019-03-31 PROBLEM — R92.8 ABNORMAL MAMMOGRAM: Status: RESOLVED | Noted: 2018-10-25 | Resolved: 2019-03-31

## 2019-03-31 PROBLEM — E66.812 OBESITY, CLASS II, BMI 35-39.9, NO COMORBIDITY: Status: ACTIVE | Noted: 2019-03-31

## 2019-03-31 PROBLEM — E55.9 VITAMIN D DEFICIENCY: Status: ACTIVE | Noted: 2019-03-31

## 2019-03-31 PROBLEM — E04.1 THYROID NODULE: Status: ACTIVE | Noted: 2019-03-31

## 2019-03-31 PROBLEM — Z12.39 BREAST CANCER SCREENING OTHER THAN MAMMOGRAM: Status: RESOLVED | Noted: 2018-10-25 | Resolved: 2019-03-31

## 2019-03-31 PROBLEM — E66.9 OBESITY, CLASS II, BMI 35-39.9, NO COMORBIDITY: Status: ACTIVE | Noted: 2019-03-31

## 2019-03-31 NOTE — ASSESSMENT & PLAN NOTE
Work on diet and exercise for weight loss  Check labs today to sure thyroid not a factor  She is an NP and understands healthy diet, hard to follow  After bw can offer support with dietician if needed

## 2019-03-31 NOTE — ASSESSMENT & PLAN NOTE
Needs follow up  Last US showed new nodule and she never had follow up  US ordered today along with BW due to weight gain

## 2019-04-01 ENCOUNTER — PATIENT MESSAGE (OUTPATIENT)
Dept: FAMILY MEDICINE CLINIC | Facility: CLINIC | Age: 44
End: 2019-04-01

## 2019-04-01 DIAGNOSIS — E66.9 OBESITY, CLASS II, BMI 35-39.9, NO COMORBIDITY: Primary | ICD-10-CM

## 2019-04-01 PROBLEM — E87.6 HYPOPOTASSEMIA: Status: ACTIVE | Noted: 2019-04-01

## 2019-04-01 PROBLEM — R79.89 LOW VITAMIN D LEVEL: Status: ACTIVE | Noted: 2019-04-01

## 2019-04-09 DIAGNOSIS — R42 VERTIGO: Primary | ICD-10-CM

## 2019-04-09 RX ORDER — MECLIZINE HYDROCHLORIDE 25 MG/1
25 TABLET ORAL 3 TIMES DAILY PRN
Qty: 30 TABLET | Refills: 0 | Status: SHIPPED | OUTPATIENT
Start: 2019-04-09 | End: 2020-06-24

## 2019-04-10 ENCOUNTER — OFFICE VISIT (OUTPATIENT)
Dept: BARIATRICS | Facility: CLINIC | Age: 44
End: 2019-04-10
Payer: COMMERCIAL

## 2019-04-10 VITALS
RESPIRATION RATE: 14 BRPM | SYSTOLIC BLOOD PRESSURE: 140 MMHG | TEMPERATURE: 98.4 F | HEART RATE: 95 BPM | DIASTOLIC BLOOD PRESSURE: 84 MMHG | BODY MASS INDEX: 33.72 KG/M2 | HEIGHT: 61 IN | WEIGHT: 178.6 LBS

## 2019-04-10 DIAGNOSIS — E55.9 VITAMIN D DEFICIENCY: ICD-10-CM

## 2019-04-10 DIAGNOSIS — E66.9 OBESITY, CLASS II, BMI 35-39.9, NO COMORBIDITY: Primary | ICD-10-CM

## 2019-04-10 PROCEDURE — 99244 OFF/OP CNSLTJ NEW/EST MOD 40: CPT | Performed by: PHYSICIAN ASSISTANT

## 2019-04-19 ENCOUNTER — HOSPITAL ENCOUNTER (OUTPATIENT)
Dept: ULTRASOUND IMAGING | Facility: HOSPITAL | Age: 44
Discharge: HOME | End: 2019-04-19
Attending: FAMILY MEDICINE
Payer: COMMERCIAL

## 2019-04-19 DIAGNOSIS — E04.1 THYROID NODULE: ICD-10-CM

## 2019-04-19 PROCEDURE — 76536 US EXAM OF HEAD AND NECK: CPT

## 2019-04-26 ENCOUNTER — HOSPITAL ENCOUNTER (OUTPATIENT)
Dept: MAMMOGRAPHY | Facility: HOSPITAL | Age: 44
Discharge: HOME | End: 2019-04-26
Attending: SURGERY
Payer: COMMERCIAL

## 2019-04-26 VITALS — WEIGHT: 178 LBS | BODY MASS INDEX: 33.61 KG/M2 | HEIGHT: 61 IN

## 2019-04-26 DIAGNOSIS — Z12.31 SCREENING MAMMOGRAM, ENCOUNTER FOR: ICD-10-CM

## 2019-04-26 PROCEDURE — 77067 SCR MAMMO BI INCL CAD: CPT

## 2019-04-26 PROCEDURE — 77063 BREAST TOMOSYNTHESIS BI: CPT

## 2019-04-28 ENCOUNTER — PATIENT MESSAGE (OUTPATIENT)
Dept: FAMILY MEDICINE CLINIC | Facility: CLINIC | Age: 44
End: 2019-04-28

## 2019-05-03 ENCOUNTER — OFFICE VISIT (OUTPATIENT)
Dept: BARIATRICS | Facility: CLINIC | Age: 44
End: 2019-05-03

## 2019-05-03 ENCOUNTER — ANNUAL EXAM (OUTPATIENT)
Dept: OBGYN CLINIC | Facility: CLINIC | Age: 44
End: 2019-05-03
Payer: COMMERCIAL

## 2019-05-03 VITALS
DIASTOLIC BLOOD PRESSURE: 88 MMHG | BODY MASS INDEX: 33.61 KG/M2 | HEIGHT: 61 IN | SYSTOLIC BLOOD PRESSURE: 146 MMHG | WEIGHT: 178 LBS

## 2019-05-03 VITALS — HEIGHT: 61 IN | WEIGHT: 177.9 LBS | BODY MASS INDEX: 33.59 KG/M2

## 2019-05-03 DIAGNOSIS — R63.5 ABNORMAL WEIGHT GAIN: ICD-10-CM

## 2019-05-03 DIAGNOSIS — Z01.419 ENCOUNTER FOR GYNECOLOGICAL EXAMINATION WITHOUT ABNORMAL FINDING: Primary | ICD-10-CM

## 2019-05-03 PROCEDURE — WMPFE WEIGHT MANAGEMENT PRO FEE EMPLOYEE

## 2019-05-03 PROCEDURE — 99396 PREV VISIT EST AGE 40-64: CPT | Performed by: OBSTETRICS & GYNECOLOGY

## 2019-05-03 PROCEDURE — RECHECK

## 2019-05-03 RX ORDER — ERYTHROMYCIN 5 MG/G
OINTMENT OPHTHALMIC
COMMUNITY
Start: 2017-05-17 | End: 2019-05-03 | Stop reason: ALTCHOICE

## 2019-05-16 ENCOUNTER — OFFICE VISIT (OUTPATIENT)
Dept: BARIATRICS | Facility: CLINIC | Age: 44
End: 2019-05-16

## 2019-05-16 VITALS — HEIGHT: 61 IN | WEIGHT: 174.1 LBS | BODY MASS INDEX: 32.87 KG/M2

## 2019-05-16 DIAGNOSIS — R63.5 ABNORMAL WEIGHT GAIN: Primary | ICD-10-CM

## 2019-05-16 PROCEDURE — RECHECK

## 2019-05-30 ENCOUNTER — OFFICE VISIT (OUTPATIENT)
Dept: BARIATRICS | Facility: CLINIC | Age: 44
End: 2019-05-30

## 2019-05-30 VITALS — WEIGHT: 170.3 LBS | BODY MASS INDEX: 32.71 KG/M2

## 2019-05-30 DIAGNOSIS — R63.5 ABNORMAL WEIGHT GAIN: ICD-10-CM

## 2019-05-30 PROCEDURE — RECHECK

## 2019-05-30 PROCEDURE — WMMFE WEIGHT MANAGEMENT MON FEE EMPLOYEE

## 2019-06-13 ENCOUNTER — OFFICE VISIT (OUTPATIENT)
Dept: BARIATRICS | Facility: CLINIC | Age: 44
End: 2019-06-13

## 2019-06-13 VITALS — BODY MASS INDEX: 32.4 KG/M2 | WEIGHT: 168.7 LBS

## 2019-06-13 DIAGNOSIS — R63.5 ABNORMAL WEIGHT GAIN: Primary | ICD-10-CM

## 2019-06-13 PROCEDURE — RECHECK

## 2019-06-26 ENCOUNTER — OFFICE VISIT (OUTPATIENT)
Dept: BARIATRICS | Facility: CLINIC | Age: 44
End: 2019-06-26

## 2019-06-26 VITALS — HEIGHT: 61 IN | BODY MASS INDEX: 31.53 KG/M2 | WEIGHT: 167 LBS

## 2019-06-26 DIAGNOSIS — R63.5 ABNORMAL WEIGHT GAIN: Primary | ICD-10-CM

## 2019-06-26 PROCEDURE — RECHECK

## 2019-07-11 ENCOUNTER — OFFICE VISIT (OUTPATIENT)
Dept: BARIATRICS | Facility: CLINIC | Age: 44
End: 2019-07-11
Payer: COMMERCIAL

## 2019-07-11 VITALS
HEIGHT: 61 IN | TEMPERATURE: 96.7 F | BODY MASS INDEX: 31.08 KG/M2 | RESPIRATION RATE: 14 BRPM | SYSTOLIC BLOOD PRESSURE: 118 MMHG | WEIGHT: 164.6 LBS | HEART RATE: 72 BPM | DIASTOLIC BLOOD PRESSURE: 82 MMHG

## 2019-07-11 DIAGNOSIS — R63.5 ABNORMAL WEIGHT GAIN: Primary | ICD-10-CM

## 2019-07-11 DIAGNOSIS — E55.9 VITAMIN D DEFICIENCY: ICD-10-CM

## 2019-07-11 DIAGNOSIS — E66.9 CLASS 1 OBESITY: ICD-10-CM

## 2019-07-11 PROBLEM — E66.811 CLASS 1 OBESITY: Status: ACTIVE | Noted: 2019-03-31

## 2019-07-11 PROCEDURE — 99214 OFFICE O/P EST MOD 30 MIN: CPT | Performed by: PHYSICIAN ASSISTANT

## 2019-07-11 NOTE — PATIENT INSTRUCTIONS
Goals: Food log (ie ) www myfitnesspal com,sparkpeople  com,loseit com,calorieking  com,etc    No sugary beverages  At least 64oz of water daily  Increase physical activity by 10 minutes daily   Gradually increase physical activity to a goal of 5 days per week for 30 minutes of MODERATE intensity PLUS 2 days per week of FULL BODY resistance training  6395-7141 calories per day, 1300 calories on exercise days  5-10 servings of fruits and vegetables per day  Keep up the great work!!

## 2019-07-11 NOTE — ASSESSMENT & PLAN NOTE
-started on 2000 IU D3 by PCP but has not been taking consistently  -suggested taking a larger dose 1-2 days per week, 5,000 IU 2 days per week or 10,000 IU one day per week   -discussed correlation between low vitamin D, insulin resistance and weight regulation   Advise check level after 3 months when patient has been taking consistently supplementation

## 2019-07-11 NOTE — PROGRESS NOTES
Assessment/Plan:    Class 1 obesity  -Patient is pursuing HealthyCORE-Intensive Lifestyle Intervention Program (alternate track)  -Initial weight loss goal of 5-10% weight loss for improved health- met  -plans to transition to bundles at completed of [employer]    Initial: 177 9  Current: 164 6  Change: -13 3 lbs (7% TBW)  Goal: 135-140 lbs    Vitamin D deficiency  -started on 2000 IU D3 by PCP but has not been taking consistently  -suggested taking a larger dose 1-2 days per week, 5,000 IU 2 days per week or 10,000 IU one day per week   -discussed correlation between low vitamin D, insulin resistance and weight regulation  Advise check level after 3 months when patient has been taking consistently supplementation    Goals:    Food log (ie ) www myfitnesspal com,sparkpeople  com,loseit com,calorieking  com,etc    No sugary beverages  At least 64oz of water daily  Increase physical activity by 10 minutes daily  Gradually increase physical activity to a goal of 5 days per week for 30 minutes of MODERATE intensity PLUS 2 days per week of FULL BODY resistance training  8011-0163 calories per day, 1300 calories on exercise days  5-10 servings of fruits and vegetables per day  Keep up the great work!!    Follow up in approximately 2 months with Non-Surgical Physician/Advanced Practitioner  25 minute visit, >50% face-to-face time spent counseling patient on diet behavior and exercise modification for weight loss  Diagnoses and all orders for this visit:    Abnormal weight gain  Comments:  see plan under class 1 obesity    Class 1 obesity    Vitamin D deficiency          Subjective:   Chief Complaint   Patient presents with    Follow-up     pt is here for follow up  Patient ID: Thomas Rutledge  is a 38 y o  female with excess weight/obesity here to pursue weight managment  Patient is pursuing Healthy[facility]-Intensive Lifestyle Intervention Program      HPI Presents for MW follow up   Doing very well in HealthyCORE program  Satisfied with current weight loss  Answered questions regarding time restricted feeding  Food logging:  Logging on My Fitness Pal [phone], 1300 on exercise days  Increased appetite/cravings: cravings for sweets around menses - satisfied with fiber one bar or small serving of chocolate  Fruit/Vegetable servings: 3 servings of fruits, 2-3 servings of fruits   Exercise: weight training 3x per week, 3 days of exercise video, salsa 1 5 hrs one day per week  Hydration: 72oz water, coffee + 1% milk     The following portions of the patient's history were reviewed and updated as appropriate: allergies, current medications, past family history, past medical history, past social history, past surgical history and problem list     Review of Systems   Respiratory: Negative for cough and shortness of breath  Cardiovascular: Negative for chest pain and palpitations  Objective:    /82 (BP Location: Left arm, Patient Position: Sitting, Cuff Size: Large)   Pulse 72   Temp (!) 96 7 °F (35 9 °C) (Tympanic)   Resp 14   Ht 5' 0 5" (1 537 m)   Wt 74 7 kg (164 lb 9 6 oz)   BMI 31 62 kg/m²      Physical Exam   Constitutional: She is oriented to person, place, and time  She appears well-developed  HENT:   Head: Normocephalic  Pulmonary/Chest: Effort normal    Neurological: She is alert and oriented to person, place, and time  Psychiatric: She has a normal mood and affect  Her behavior is normal  Thought content normal    Nursing note and vitals reviewed

## 2019-07-11 NOTE — ASSESSMENT & PLAN NOTE
-Patient is pursuing HealthyCORE-Intensive Lifestyle Intervention Program (alternate track)  -Initial weight loss goal of 5-10% weight loss for improved health- met  -plans to transition to bundles at completed of HealthyCORE    Initial: 177 9  Current: 164 6  Change: -13 3 lbs (7% TBW)  Goal: 135-140 lbs

## 2019-08-08 ENCOUNTER — OFFICE VISIT (OUTPATIENT)
Dept: BARIATRICS | Facility: CLINIC | Age: 44
End: 2019-08-08

## 2019-08-08 DIAGNOSIS — R63.5 ABNORMAL WEIGHT GAIN: Primary | ICD-10-CM

## 2019-08-08 PROCEDURE — RECHECK

## 2019-08-08 NOTE — PROGRESS NOTES
Weight Management Medical Nutrition Assessment  Radha is here for her month 3 healthy core visit (alternate program)  Current weight: 163 8 lbs  Loss of 14 1 lbs since starting healthy core 3 months ago (8% TBW)  Pt has been consistently tracking her intake and is averaging 9044-9603 kcal/day  Pt has been measuring her food and planning her meals ahead of time  Pt feels like she has achieved a sustainable lifestyle change  Pt feels like her weight loss has started to slow down  Diet recall reveals that pt is consuming the appropriate amount of kcal for exercise and non-exercise days, and that she is eating consistently throughout the day  Pt has been increased her physical activity to 6x/week  Pt suspects that she may have gained some muscle mass with her new exercise routine  Body composition and metabolism tests will be completed at next appointment to explore further  Pt to follow up in 2 weeks       Anthropometric Measurements  Start Weight (lbs): 177 9 lbs  Current Weight (lbs): 163 8 lbs  TBW % Change from start weight: 8%  Ideal Body Weight (lbs): 100 25 lbs  Goal Weight (lbs): STG: <160 lbs LT-140 lbs     Weight Loss History  Previous weight loss attempts: Commercial Programs (Weight Watchers, [employer], etc )  High Protein/Low CHO diets (Atkins, Union, etc ), Self created diets, Phentermine   Highest adult weight: 185 lbs 5 years ago (went to [facility]) - got down to 158 lbs  Lowest adult weight: 125 lbs     Food and Nutrition Related History  Wake up: 5:30-5:45 am  Bed Time: 9:30-10:30 pm    Meal Plan  Breakfast: high protein instant oatmeal made w/water OR eggs & 647 bread or lul thin bagel w/light butter @ 6:30 am  Snack: string cheese or almonds or protein bar or pina colada shake   Lunch: salad w/chicken & balsamic vinegar or veggie sandwich or instant oatmeal @ 12 pm  Snack: flex chips  Dinner:4-6 oz protein (fish, chicken, or steak) & 1 5 cups of roasted vegetables, sometimes has 1/2 cup starch  Snack: yasso greek yogurt bar or brownie brittle     Beverages: 64+ oz sparkling flavored water & plain water, 1 cup of coffee w/1 tsp of skinny cocoa powder (20 kcal/tsp), sometimes has a diet coke    Food restrictions: none  Cooking: self   Food Shopping: self  Lives at home with  with 2 children, 1 child is off at college   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    Physical Activity Intake  Activity: cardio (walking, salsa, exercise videos, etc ) 3x/week and strength training 3x/week  Physical limitations/barriers to exercise: none    Estimated Needs  Energy  Bear Tabitha Energy Needs: BMR : 9719   1-2# loss sedentary: 325-6336             Lightly active: 825-1325     Protein: 55-68 g (1 2-1 5g/kg IBW)  Fluid: 53 oz (35mL/kg IBW)    Nutrition Diagnosis  Yes; Overweight/obesity  related to Excess energy intake as evidenced by  BMI more than normative standard for age and sex (obesity-grade I 26-30  9)       Nutrition Intervention  Meal Plan  2147-7511 kcal  55-68 g protein/day    Nutrition Education:   Healthy Core Manual  Calorie controlled menu  Lean protein food choices  Healthy snack options  Food journaling tips      Nutrition Counseling:  Strategies: meal planning, portion sizes, healthy snack choices, hydration, fiber intake, protein intake, exercise, food journal      Monitoring and Evaluation:  Evaluation criteria:  Energy Intake: 1341-7220 kcal  Meet protein needs  Maintain adequate hydration  Monitor weekly weight  Meal planning/preparation  Food journal   Portions at mealtimes and snacks  Physical activity     Barriers to learning:none  Readiness to change: Action  Comprehension: excellent  Expected Compliance: excellent

## 2019-08-20 ENCOUNTER — OFFICE VISIT (OUTPATIENT)
Dept: BARIATRICS | Facility: CLINIC | Age: 44
End: 2019-08-20

## 2019-08-20 VITALS — WEIGHT: 159.2 LBS | HEIGHT: 61 IN | BODY MASS INDEX: 30.06 KG/M2

## 2019-08-20 DIAGNOSIS — R63.5 ABNORMAL WEIGHT GAIN: Primary | ICD-10-CM

## 2019-08-20 PROCEDURE — RECHECK

## 2019-08-20 NOTE — PROGRESS NOTES
Weight Management Medical Nutrition Assessment  Radha is here for her month 3 healthy core visit (alternate program)  Current weight: 159 2 lbs  Loss of 4 6 lbs since last visit, and 18 7 lbs overall (11% TBW)  Pt feels like she finally pushed through her weight loss plateau by being consistent with her healthy habits  Pt is tracking her intake consistently and is averaging 2307-7880 kcal/day  Metabolism test could not be completed due to machine error - will complete in 2 weeks  Body composition test completed and results reviewed  Pt lost a few pounds of muscle, but was able to maximize fat loss  Pt states that she does not usually pay attention to how much protein she is eating as she tracks her intake on Ep[employer]Pal  After reviewing her food record, pt realized that she does fall short of her protein goal some days  Encouraged pt to consume enough protein in order to minimize muscle loss in the future and to promote a healthy metabolism   Pt will follow up in 2 weeks for REE metabolism test     Anthropometric Measurements  Start Weight (lbs): 177 9 lbs  Current Weight (lbs): 159 2 lbs  TBW % Change from start weight: 11%  Ideal Body Weight (lbs): 100 25 lbs  Goal Weight (lbs): STG: <160 lbs - achieved / LT-140 lbs     Weight Loss History  Previous weight loss attempts: Commercial Programs (Weight Watchers, Hannah Blonder, etc )  High Protein/Low CHO diets (Atkins, Union, etc ), Self created diets, Phentermine   Highest adult weight: 185 lbs 5 years ago (went to [facility]) - got down to 158 lbs  Lowest adult weight: 125 lbs     Food and Nutrition Related History  Wake up: 5:30-5:45 am  Bed Time: 9:30-10:30 pm    Meal Plan  Breakfast: eggs & 647 bread OR oatmeal made w/water @ 6:30 am  Snack: almonds OR 70 kcal fiber one bars OR chocolate hummus w/flat pretzels  Lunch: salad w/chicken or tuna & balsamic vinegar OR veggie sandwich OR instant oatmeal OR 1 slice of pizza @ 12 pm  Snack: almonds OR piece of fruit  Dinner: 4-6 oz protein (fish, chicken, or steak) & 1 5 cups of roasted vegetables, sometimes has 1/2 cup starch  Snack: yasso greek yogurt bar or brownie brittle     Beverages: coffee w/skinny hot chocolate, 64+ oz water     Food restrictions: none  Cooking: self   Food Shopping: self  Lives at home with  with 2 children, 1 child is off at college   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     Physical Activity Intake  Activity: cardio (walking, salsa, exercise videos, etc ) 3x/week and strength training 3x/week  Physical limitations/barriers to exercise: none    Estimated Needs  Energy  Tanita: BMR: 1399 X 1 3 -1000 = 819 kcal  Bear Tabitha Energy Needs: BMR : 4853   1-2# loss sedentary: 500-0089            Lightly active:   166-8159   Protein: 55-68 g (1 2-1 5g/kg IBW)  Fluid: 53 oz (35mL/kg IBW)    Nutrition Diagnosis  Yes; Overweight/obesity  related to Excess energy intake as evidenced by  BMI more than normative standard for age and sex (obesity-grade I 26-30  9)       Nutrition Intervention  Meal Plan  1303-1399 kcal  55-68 g protein/day    Nutrition Education:   Healthy Core Manual  Calorie controlled menu  Lean protein food choices  Healthy snack options  Food journaling tips      Nutrition Counseling:  Strategies: meal planning, portion sizes, healthy snack choices, hydration, fiber intake, protein intake, exercise, food journal      Monitoring and Evaluation:  Evaluation criteria:  Energy Intake: 8935-4343 kcal/day  Meet protein needs  Maintain adequate hydration  Monitor weekly weight  Meal planning/preparation  Food journal   Portions at mealtimes and snacks  Physical activity     Barriers to learning:none  Readiness to change: Action  Comprehension: excellent  Expected Compliance: excellent

## 2019-10-16 ENCOUNTER — OFFICE VISIT (OUTPATIENT)
Dept: BARIATRICS | Facility: CLINIC | Age: 44
End: 2019-10-16
Payer: COMMERCIAL

## 2019-10-16 VITALS
SYSTOLIC BLOOD PRESSURE: 116 MMHG | BODY MASS INDEX: 29.15 KG/M2 | HEART RATE: 68 BPM | RESPIRATION RATE: 16 BRPM | HEIGHT: 61 IN | WEIGHT: 154.38 LBS | DIASTOLIC BLOOD PRESSURE: 72 MMHG | TEMPERATURE: 97.9 F

## 2019-10-16 VITALS — BODY MASS INDEX: 29.19 KG/M2 | HEIGHT: 61 IN | WEIGHT: 154.6 LBS

## 2019-10-16 DIAGNOSIS — E66.9 CLASS 1 OBESITY: Primary | ICD-10-CM

## 2019-10-16 DIAGNOSIS — R63.5 ABNORMAL WEIGHT GAIN: Primary | ICD-10-CM

## 2019-10-16 DIAGNOSIS — E55.9 VITAMIN D DEFICIENCY: ICD-10-CM

## 2019-10-16 PROCEDURE — 99213 OFFICE O/P EST LOW 20 MIN: CPT | Performed by: PHYSICIAN ASSISTANT

## 2019-10-16 PROCEDURE — RECHECK

## 2019-10-16 NOTE — ASSESSMENT & PLAN NOTE
-Patient is pursuing Conservative Program after completing healthy core   -Initial weight loss goal of 5-10% weight loss for improved health- met    Initial: 177 9 lbs  Current: 154 6 lbs  Change: -23 3 lbs   Goal: 135-140 lbs    Class 1 --> overweight   Goals:  Food log (ie ) www myfitnesspal com,sparkpeople  com,loseit com,calorieking  com,etc    No sugary beverages  At least 64oz of water daily  Increase physical activity by 10 minutes daily   Gradually increase physical activity to a goal of 5 days per week for 30 minutes of MODERATE intensity PLUS 2 days per week of FULL BODY resistance training--keep up the great work   6061-1008 calories per day, 1300 calories on exercise days  5-10 servings of fruits and vegetables per day  Keep up the great work!!

## 2019-10-16 NOTE — PROGRESS NOTES
Pt completed her 3 month Healthy Core visit with RD back in August, however the indirect calorimetry (REE) machine was not working at the time  Pt here today for REE testing  Results show that her metabolism is fairly on pare with other women her height, weight and age  Her REE is found to be 1339 caloires with an added 399 calories for lifestyle/activity  Reviewed results with pt  She is interested in losing another 15lbs for a goal weight of 140lbs  Advised pt to continue to maintain her calorie intake between 2893-1076 calories per day and to continue her exercise regimen

## 2019-12-23 ENCOUNTER — OFFICE VISIT (OUTPATIENT)
Dept: BARIATRICS | Facility: CLINIC | Age: 44
End: 2019-12-23
Payer: COMMERCIAL

## 2019-12-23 VITALS
TEMPERATURE: 97.8 F | HEART RATE: 93 BPM | BODY MASS INDEX: 29.07 KG/M2 | HEIGHT: 61 IN | DIASTOLIC BLOOD PRESSURE: 78 MMHG | SYSTOLIC BLOOD PRESSURE: 122 MMHG | WEIGHT: 154 LBS | RESPIRATION RATE: 16 BRPM

## 2019-12-23 DIAGNOSIS — E55.9 VITAMIN D DEFICIENCY: ICD-10-CM

## 2019-12-23 DIAGNOSIS — R63.5 ABNORMAL WEIGHT GAIN: Primary | ICD-10-CM

## 2019-12-23 DIAGNOSIS — E66.3 OVERWEIGHT: ICD-10-CM

## 2019-12-23 PROCEDURE — 99214 OFFICE O/P EST MOD 30 MIN: CPT | Performed by: PHYSICIAN ASSISTANT

## 2019-12-23 RX ORDER — MULTIVIT-MIN/IRON/FOLIC ACID/K 18-600-40
5000 CAPSULE ORAL 2 TIMES WEEKLY
COMMUNITY

## 2019-12-23 NOTE — ASSESSMENT & PLAN NOTE
-Patient is pursuing Conservative Program after completing healthy core   -Initial weight loss goal of 5-10% weight loss for improved health- met  -Class 1 --> Overweight  -discussed use of medications, patient defers currently  -see goals    Initial: 177 9 lbs  Current: 154 lbs  Change: -23 9 lbs   Goal: 135-140 lbs

## 2019-12-23 NOTE — PROGRESS NOTES
Assessment/Plan:    Overweight  -Patient is pursuing Conservative Program after completing healthy core   -Initial weight loss goal of 5-10% weight loss for improved health- met  -Class 1 --> Overweight  -discussed use of medications, patient defers currently  -see goals    Initial: 177 9 lbs  Current: 154 lbs  Change: -23 9 lbs   Goal: 135-140 lbs    Vitamin D deficiency  -on 10,000 IU weekly  Due for repeat  Patient will have this completed    Goals:    Food log (ie ) www myfitnesspal com,sparkpeople  com,loseit com,calorieking  com,etc    No sugary beverages  At least 64oz of water daily  Increase physical activity by 10 minutes daily  Gradually increase physical activity to a goal of 5 days per week for 30 minutes of MODERATE intensity PLUS 2 days per week of FULL BODY resistance training  3205-0877 calories per day - 1850-5696 calories on exercise days  5-10 servings of fruits and vegetables per day  Goal protein intake 80 grams  80 grams of carbs or less  Interval eating - eating within 8 or 10 hour period - fasting for 16 or 14 hours    Follow up in approximately 2 months with Non-Surgical Physician/Advanced Practitioner  25 minute visit, >50% face-to-face time spent counseling patient on diet behavior and exercise modification for weight loss  Diagnoses and all orders for this visit:    Abnormal weight gain  Comments:  see plan under overweight    Overweight    Vitamin D deficiency    Other orders  -     Vitamin D, Cholecalciferol, 50 MCG ( UT) CAPS; Take 5,000 Units by mouth          Subjective:   Chief Complaint   Patient presents with    Follow-up     Patient is here for MWM month follow-up  Patient ID: Yamileth Bacon  is a 38 y o  female with excess weight/obesity here to pursue weight managment  Patient is pursuing Conservative Program      HPI Presents for MWM follow up  Would like to lose another 10-15 lbs       Food loggin calories on non exercise days and 1400 on exercise days  Increased appetite/cravings: denies  Exercise: yoga 2x per week, weight lifting 2-3x per week + 10-15 min cardio  Hydration: 70-80oz water per day + flavored sparking water, 1 cup of coffee + sweet Quincy creamer measured    Colonoscopy: N/A due to age    The following portions of the patient's history were reviewed and updated as appropriate: allergies, current medications, past family history, past medical history, past social history, past surgical history and problem list     Review of Systems   Respiratory: Negative for cough and shortness of breath  Cardiovascular: Negative for chest pain and palpitations  Psychiatric/Behavioral:        Reports mood stable; denies depression     Objective:    /78 (BP Location: Right arm, Patient Position: Sitting, Cuff Size: Adult)   Pulse 93   Temp 97 8 °F (36 6 °C) (Tympanic)   Resp 16   Ht 5' 0 5" (1 537 m)   Wt 69 9 kg (154 lb)   BMI 29 58 kg/m²      Physical Exam   Constitutional: She is oriented to person, place, and time  She appears well-developed  HENT:   Head: Normocephalic  Pulmonary/Chest: Effort normal    Neurological: She is alert and oriented to person, place, and time  Psychiatric: She has a normal mood and affect  Her behavior is normal  Thought content normal    Nursing note and vitals reviewed

## 2019-12-23 NOTE — PATIENT INSTRUCTIONS
Goals: Food log (ie ) www myfitnesspal com,sparkpeople  com,loseit com,calorieking  com,etc    No sugary beverages  At least 64oz of water daily  Increase physical activity by 10 minutes daily   Gradually increase physical activity to a goal of 5 days per week for 30 minutes of MODERATE intensity PLUS 2 days per week of FULL BODY resistance training  6134-0794 calories per day - 1615-1287 calories on exercise days  5-10 servings of fruits and vegetables per day  Goal protein intake 80 grams  80 grams of carbs or less  Interval eating - eating within 8 or 10 hour period - fasting for 16 or 14 hours

## 2019-12-27 ENCOUNTER — TRANSCRIBE ORDERS (OUTPATIENT)
Dept: LAB | Facility: CLINIC | Age: 44
End: 2019-12-27

## 2019-12-27 ENCOUNTER — APPOINTMENT (OUTPATIENT)
Dept: LAB | Facility: CLINIC | Age: 44
End: 2019-12-27
Payer: COMMERCIAL

## 2019-12-27 DIAGNOSIS — E55.9 VITAMIN D DEFICIENCY: ICD-10-CM

## 2019-12-27 LAB — 25(OH)D3 SERPL-MCNC: 26.8 NG/ML (ref 30–100)

## 2019-12-27 PROCEDURE — 82306 VITAMIN D 25 HYDROXY: CPT

## 2019-12-27 PROCEDURE — 36415 COLL VENOUS BLD VENIPUNCTURE: CPT

## 2020-01-28 ENCOUNTER — TELEPHONE (OUTPATIENT)
Dept: BARIATRICS | Facility: CLINIC | Age: 45
End: 2020-01-28

## 2020-01-28 NOTE — TELEPHONE ENCOUNTER
Called Radha moreira unable to leave a voicemail  Looked into charges and bill she was receiving and did that the incorrect code was used  I did call the billing office to find out how I can get this removed and will email the billing office to have that taken care of       ----- Message from Nicole Snyder sent at 1/28/2020 12:59 PM EST -----  Regarding: Patient Billing  Laura Washington,    The aforementioned patient called stating she keeps getting a bill for $139 in regards to a visit she had here with Marquis moreira on 10/16/2019  The patient is concerned because the bill is now over 27 days old and she doesn't want to be responsible because she already paid for the visit when she was here  Thanks!

## 2020-02-18 ENCOUNTER — TELEPHONE (OUTPATIENT)
Dept: OTHER | Facility: OTHER | Age: 45
End: 2020-02-18

## 2020-02-18 NOTE — TELEPHONE ENCOUNTER
PT has an upcoming appointment this morning, but would like office to call back to reschedule for another date & time, as the PT is not able to make current one

## 2020-03-09 ENCOUNTER — OFFICE VISIT (OUTPATIENT)
Dept: BARIATRICS | Facility: CLINIC | Age: 45
End: 2020-03-09
Payer: COMMERCIAL

## 2020-03-09 VITALS
WEIGHT: 149.2 LBS | HEIGHT: 61 IN | HEART RATE: 69 BPM | RESPIRATION RATE: 16 BRPM | BODY MASS INDEX: 28.17 KG/M2 | TEMPERATURE: 98.7 F | DIASTOLIC BLOOD PRESSURE: 64 MMHG | SYSTOLIC BLOOD PRESSURE: 112 MMHG

## 2020-03-09 DIAGNOSIS — R63.5 ABNORMAL WEIGHT GAIN: Primary | ICD-10-CM

## 2020-03-09 DIAGNOSIS — Z51.81 MEDICATION MONITORING ENCOUNTER: ICD-10-CM

## 2020-03-09 DIAGNOSIS — E66.3 OVERWEIGHT: ICD-10-CM

## 2020-03-09 DIAGNOSIS — E55.9 VITAMIN D DEFICIENCY: ICD-10-CM

## 2020-03-09 LAB
ATRIAL RATE: 65 BPM
P AXIS: 28 DEGREES
PR INTERVAL: 142 MS
QRS AXIS: 55 DEGREES
QRSD INTERVAL: 74 MS
QT INTERVAL: 410 MS
QTC INTERVAL: 426 MS
T WAVE AXIS: 36 DEGREES
VENTRICULAR RATE: 65 BPM

## 2020-03-09 PROCEDURE — 1036F TOBACCO NON-USER: CPT | Performed by: PHYSICIAN ASSISTANT

## 2020-03-09 PROCEDURE — 99214 OFFICE O/P EST MOD 30 MIN: CPT | Performed by: PHYSICIAN ASSISTANT

## 2020-03-09 PROCEDURE — 3008F BODY MASS INDEX DOCD: CPT | Performed by: PHYSICIAN ASSISTANT

## 2020-03-09 PROCEDURE — 93005 ELECTROCARDIOGRAM TRACING: CPT

## 2020-03-09 PROCEDURE — 93010 ELECTROCARDIOGRAM REPORT: CPT | Performed by: INTERNAL MEDICINE

## 2020-03-09 NOTE — PATIENT INSTRUCTIONS
Goals: Food log (ie ) www myfitnesspal com,sparkpeople  com,loseit com,calorieking  com,etc    No sugary beverages  At least 64oz of water daily  Increase physical activity by 10 minutes daily   Gradually increase physical activity to a goal of 5 days per week for 30 minutes of MODERATE intensity PLUS 2 days per week of FULL BODY resistance training  0196-2909 calories per day, 4010-6382 calories on exercise days  5-10 servings of fruits and vegetables per day  80 grams of carbs per day

## 2020-03-09 NOTE — PROGRESS NOTES
Assessment/Plan:    Overweight  -Patient is pursuing Conservative Program after completing healthy core   -Initial weight loss goal of 5-10% weight loss for improved health- met  -Class 1 --> Overweight  -Patient reported she feels very hungry trying to stick to 1000 calorie range on days she is not exercising  She feels better at 8849-5460 calorie range  Time restricted eating did not work for her schedule and feels better eating at frequent intervals  Discussed intermittent fasting 2/5 as well  Patient does not think she will be able to incorporate this  -patient would like to trial phentermine  Discussed expected weight loss with medication as well as indication for long term use to help with weight loss maintenance  Denies hx glaucoma, arhythmia, PAD, aneurysm  Has hx tubal ligation  Will check EKG    Initial: 177 9 lbs  Current: 149  2  Change: -28 7 lbs (16% TBW)  Goal: 135-140 lbs    Vitamin D deficiency  -improved but still low  -advised increasing vitamin D to 15,000 IU weekly    Goals:    Food log (ie ) www myfitnesspal com,sparkpeople  com,loseit com,calorieking  com,etc    No sugary beverages  At least 64oz of water daily  Increase physical activity by 10 minutes daily  Gradually increase physical activity to a goal of 5 days per week for 30 minutes of MODERATE intensity PLUS 2 days per week of FULL BODY resistance training  1865-3618 calories per day, 6149-5635 calories on exercise days  5-10 servings of fruits and vegetables per day  80 grams of carbs per day     Follow up in approximately 6-8 weeks with Non-Surgical Physician/Advanced Practitioner  Diagnoses and all orders for this visit:    Abnormal weight gain  Comments:  see plan under overweight    Overweight  -     ECG 12 lead; Future    Medication monitoring encounter  -     ECG 12 lead; Future    Vitamin D deficiency          Subjective:   Chief Complaint   Patient presents with    Follow-up     Patient is here for [facility] follow-up  Patient ID: Audrey Henry  is a 41 y o  female with excess weight/obesity here to pursue weight managment  Patient is pursuing Conservative Program      HPI Patient presents for medical weight management follow up  Reports she has reduced her carbohydrates and is tracking this on MyFitCrushBlvdPal  Reports she is staying around 80 grams    Food logging: logging 9925-9592 calories per day on each day, feels hungry at 1000 calories per day   Increased appetite/cravings: + hunger  Exercise: yoga 2x per week, weight lifting 2-3x per week + 10-15 min cardio  Hydration: 80 oz of water, + flavored sparkling water, 1 cup of coffee + sweet Quincy creamer measured    Colonoscopy: N/A due to age    The following portions of the patient's history were reviewed and updated as appropriate: allergies, current medications, past family history, past medical history, past social history, past surgical history and problem list     Review of Systems   Cardiovascular: Negative for chest pain and palpitations  Psychiatric/Behavioral: The patient is not nervous/anxious  Reports mood stable       Objective:    /64 (BP Location: Right arm, Patient Position: Sitting, Cuff Size: Adult)   Pulse 69   Temp 98 7 °F (37 1 °C) (Tympanic)   Resp 16   Ht 5' 0 5" (1 537 m)   Wt 67 7 kg (149 lb 3 2 oz)   BMI 28 66 kg/m²      Physical Exam   Nursing note and vitals reviewed  Constitutional   General appearance: Abnormal   well developed and overweight  Eyes No conjunctival pallor  Ears, Nose, Mouth, and Throat Oral mucosa moist    Pulmonary   Respiratory effort: No increased work of breathing or signs of respiratory distress  Auscultation of lungs: Clear to auscultation, equal breath sounds bilaterally, no wheezes, no rales, no rhonci  Cardiovascular   Auscultation of heart: Normal rate and rhythm, normal S1 and S2, without murmurs  Examination of extremities for edema and/or varicosities: Normal   no edema     Abdomen Abdomen: Abnormal   Bowel sounds were normal  The abdomen was soft and nontender     Musculoskeletal   Gait and station: Normal     Psychiatric   Orientation to person, place and time: Normal     Affect: appropriate

## 2020-03-09 NOTE — ASSESSMENT & PLAN NOTE
-Patient is pursuing Conservative Program after completing healthy core   -Initial weight loss goal of 5-10% weight loss for improved health- met  -Class 1 --> Overweight  -Patient reported she feels very hungry trying to stick to 1000 calorie range on days she is not exercising  She feels better at 9830-2537 calorie range  Time restricted eating did not work for her schedule and feels better eating at frequent intervals  Discussed intermittent fasting 2/5 as well  Patient does not think she will be able to incorporate this  -patient would like to trial phentermine  Discussed expected weight loss with medication as well as indication for long term use to help with weight loss maintenance  Denies hx glaucoma, arhythmia, PAD, aneurysm  Has hx tubal ligation  Will check EKG    Initial: 177 9 lbs  Current: 149  2  Change: -28 7 lbs (16% TBW)  Goal: 135-140 lbs

## 2020-03-12 DIAGNOSIS — E66.3 OVERWEIGHT: Primary | ICD-10-CM

## 2020-03-12 RX ORDER — PHENTERMINE HYDROCHLORIDE 15 MG/1
15 CAPSULE ORAL EVERY MORNING
Qty: 30 CAPSULE | Refills: 1 | Status: SHIPPED | OUTPATIENT
Start: 2020-03-12 | End: 2020-06-24

## 2020-04-06 ENCOUNTER — TELEPHONE (OUTPATIENT)
Dept: BARIATRICS | Facility: CLINIC | Age: 45
End: 2020-04-06

## 2020-04-07 ENCOUNTER — TELEPHONE (OUTPATIENT)
Dept: BARIATRICS | Facility: CLINIC | Age: 45
End: 2020-04-07

## 2020-04-14 ENCOUNTER — TRANSCRIBE ORDERS (OUTPATIENT)
Dept: ADMINISTRATIVE | Facility: HOSPITAL | Age: 45
End: 2020-04-14

## 2020-04-14 DIAGNOSIS — Z12.31 VISIT FOR SCREENING MAMMOGRAM: Primary | ICD-10-CM

## 2020-05-07 ENCOUNTER — PATIENT MESSAGE (OUTPATIENT)
Dept: FAMILY MEDICINE CLINIC | Facility: CLINIC | Age: 45
End: 2020-05-07

## 2020-05-11 ENCOUNTER — TELEPHONE (OUTPATIENT)
Dept: FAMILY MEDICINE CLINIC | Facility: CLINIC | Age: 45
End: 2020-05-11

## 2020-05-11 ENCOUNTER — LAB (OUTPATIENT)
Dept: LAB | Facility: CLINIC | Age: 45
End: 2020-05-11
Payer: COMMERCIAL

## 2020-05-11 ENCOUNTER — TELEMEDICINE (OUTPATIENT)
Dept: FAMILY MEDICINE CLINIC | Facility: CLINIC | Age: 45
End: 2020-05-11
Payer: COMMERCIAL

## 2020-05-11 VITALS — TEMPERATURE: 97.8 F | HEART RATE: 83 BPM | WEIGHT: 148 LBS | BODY MASS INDEX: 27.94 KG/M2 | HEIGHT: 61 IN

## 2020-05-11 DIAGNOSIS — Z20.828 EXPOSURE TO SARS-ASSOCIATED CORONAVIRUS: Primary | ICD-10-CM

## 2020-05-11 DIAGNOSIS — Z20.828 EXPOSURE TO SARS-ASSOCIATED CORONAVIRUS: ICD-10-CM

## 2020-05-11 PROCEDURE — 86769 SARS-COV-2 COVID-19 ANTIBODY: CPT

## 2020-05-11 PROCEDURE — 36415 COLL VENOUS BLD VENIPUNCTURE: CPT

## 2020-05-11 PROCEDURE — 99213 OFFICE O/P EST LOW 20 MIN: CPT | Performed by: FAMILY MEDICINE

## 2020-05-12 LAB — SARS-COV-2 IGM SERPL QL IA: NEGATIVE

## 2020-05-13 LAB — SARS-COV-2 IGG SERPL QL IA: NEGATIVE

## 2020-05-15 ENCOUNTER — ANNUAL EXAM (OUTPATIENT)
Dept: OBGYN CLINIC | Facility: CLINIC | Age: 45
End: 2020-05-15
Payer: COMMERCIAL

## 2020-05-15 VITALS
HEIGHT: 60 IN | SYSTOLIC BLOOD PRESSURE: 148 MMHG | WEIGHT: 154.8 LBS | DIASTOLIC BLOOD PRESSURE: 86 MMHG | BODY MASS INDEX: 30.39 KG/M2

## 2020-05-15 DIAGNOSIS — Z01.419 ENCOUNTER FOR GYNECOLOGICAL EXAMINATION WITHOUT ABNORMAL FINDING: Primary | ICD-10-CM

## 2020-05-15 PROCEDURE — 99396 PREV VISIT EST AGE 40-64: CPT | Performed by: OBSTETRICS & GYNECOLOGY

## 2020-06-25 ENCOUNTER — TELEPHONE (OUTPATIENT)
Dept: FAMILY MEDICINE CLINIC | Facility: CLINIC | Age: 45
End: 2020-06-25

## 2020-06-26 ENCOUNTER — OFFICE VISIT (OUTPATIENT)
Dept: FAMILY MEDICINE CLINIC | Facility: CLINIC | Age: 45
End: 2020-06-26
Payer: COMMERCIAL

## 2020-06-26 VITALS
OXYGEN SATURATION: 99 % | BODY MASS INDEX: 30.43 KG/M2 | DIASTOLIC BLOOD PRESSURE: 76 MMHG | WEIGHT: 155 LBS | HEART RATE: 83 BPM | SYSTOLIC BLOOD PRESSURE: 138 MMHG | HEIGHT: 60 IN | TEMPERATURE: 97.8 F | RESPIRATION RATE: 16 BRPM

## 2020-06-26 DIAGNOSIS — Z11.4 SCREENING FOR HIV (HUMAN IMMUNODEFICIENCY VIRUS): ICD-10-CM

## 2020-06-26 DIAGNOSIS — Z00.00 WELL ADULT EXAM: Primary | ICD-10-CM

## 2020-06-26 DIAGNOSIS — E04.1 THYROID NODULE: ICD-10-CM

## 2020-06-26 DIAGNOSIS — R79.89 LOW VITAMIN D LEVEL: ICD-10-CM

## 2020-06-26 DIAGNOSIS — E66.09 CLASS 1 OBESITY DUE TO EXCESS CALORIES WITH SERIOUS COMORBIDITY AND BODY MASS INDEX (BMI) OF 30.0 TO 30.9 IN ADULT: ICD-10-CM

## 2020-06-26 DIAGNOSIS — I10 ESSENTIAL HYPERTENSION: ICD-10-CM

## 2020-06-26 PROCEDURE — 1036F TOBACCO NON-USER: CPT | Performed by: FAMILY MEDICINE

## 2020-06-26 PROCEDURE — 3078F DIAST BP <80 MM HG: CPT | Performed by: FAMILY MEDICINE

## 2020-06-26 PROCEDURE — 3075F SYST BP GE 130 - 139MM HG: CPT | Performed by: FAMILY MEDICINE

## 2020-06-26 PROCEDURE — 99213 OFFICE O/P EST LOW 20 MIN: CPT | Performed by: FAMILY MEDICINE

## 2020-06-26 PROCEDURE — 99396 PREV VISIT EST AGE 40-64: CPT | Performed by: FAMILY MEDICINE

## 2020-06-26 RX ORDER — LISINOPRIL 5 MG/1
5 TABLET ORAL DAILY
Qty: 30 TABLET | Refills: 1 | Status: SHIPPED | OUTPATIENT
Start: 2020-06-26 | End: 2020-07-29

## 2020-06-29 PROBLEM — E66.09 CLASS 1 OBESITY DUE TO EXCESS CALORIES WITH SERIOUS COMORBIDITY AND BODY MASS INDEX (BMI) OF 30.0 TO 30.9 IN ADULT: Status: ACTIVE | Noted: 2019-03-31

## 2020-07-17 ENCOUNTER — TRANSCRIBE ORDERS (OUTPATIENT)
Dept: ADMINISTRATIVE | Facility: HOSPITAL | Age: 45
End: 2020-07-17

## 2020-07-17 ENCOUNTER — LAB (OUTPATIENT)
Dept: LAB | Facility: HOSPITAL | Age: 45
End: 2020-07-17
Payer: COMMERCIAL

## 2020-07-17 DIAGNOSIS — Z00.00 WELL ADULT EXAM: ICD-10-CM

## 2020-07-17 DIAGNOSIS — R79.89 LOW VITAMIN D LEVEL: ICD-10-CM

## 2020-07-17 DIAGNOSIS — I10 ESSENTIAL HYPERTENSION: ICD-10-CM

## 2020-07-17 DIAGNOSIS — Z11.4 SCREENING FOR HIV (HUMAN IMMUNODEFICIENCY VIRUS): ICD-10-CM

## 2020-07-17 DIAGNOSIS — E04.1 THYROID NODULE: ICD-10-CM

## 2020-07-17 LAB
25(OH)D3 SERPL-MCNC: 26.3 NG/ML (ref 30–100)
ALBUMIN SERPL BCP-MCNC: 3.7 G/DL (ref 3.5–5)
ALP SERPL-CCNC: 53 U/L (ref 46–116)
ALT SERPL W P-5'-P-CCNC: 32 U/L (ref 12–78)
ANION GAP SERPL CALCULATED.3IONS-SCNC: 8 MMOL/L (ref 4–13)
AST SERPL W P-5'-P-CCNC: 16 U/L (ref 5–45)
BASOPHILS # BLD AUTO: 0.03 THOUSANDS/ΜL (ref 0–0.1)
BASOPHILS NFR BLD AUTO: 1 % (ref 0–1)
BILIRUB SERPL-MCNC: 0.5 MG/DL (ref 0.2–1)
BUN SERPL-MCNC: 16 MG/DL (ref 5–25)
CALCIUM SERPL-MCNC: 8.7 MG/DL (ref 8.3–10.1)
CHLORIDE SERPL-SCNC: 103 MMOL/L (ref 100–108)
CHOLEST SERPL-MCNC: 170 MG/DL (ref 50–200)
CO2 SERPL-SCNC: 28 MMOL/L (ref 21–32)
CREAT SERPL-MCNC: 0.8 MG/DL (ref 0.6–1.3)
EOSINOPHIL # BLD AUTO: 0.11 THOUSAND/ΜL (ref 0–0.61)
EOSINOPHIL NFR BLD AUTO: 3 % (ref 0–6)
ERYTHROCYTE [DISTWIDTH] IN BLOOD BY AUTOMATED COUNT: 12.4 % (ref 11.6–15.1)
GFR SERPL CREATININE-BSD FRML MDRD: 90 ML/MIN/1.73SQ M
GLUCOSE P FAST SERPL-MCNC: 98 MG/DL (ref 65–99)
HCT VFR BLD AUTO: 40.7 % (ref 34.8–46.1)
HCV AB SER QL: NORMAL
HDLC SERPL-MCNC: 69 MG/DL
HGB BLD-MCNC: 13.5 G/DL (ref 11.5–15.4)
IMM GRANULOCYTES # BLD AUTO: 0.03 THOUSAND/UL (ref 0–0.2)
IMM GRANULOCYTES NFR BLD AUTO: 1 % (ref 0–2)
LDLC SERPL CALC-MCNC: 93 MG/DL (ref 0–100)
LYMPHOCYTES # BLD AUTO: 1.81 THOUSANDS/ΜL (ref 0.6–4.47)
LYMPHOCYTES NFR BLD AUTO: 41 % (ref 14–44)
MCH RBC QN AUTO: 30.8 PG (ref 26.8–34.3)
MCHC RBC AUTO-ENTMCNC: 33.2 G/DL (ref 31.4–37.4)
MCV RBC AUTO: 93 FL (ref 82–98)
MONOCYTES # BLD AUTO: 0.35 THOUSAND/ΜL (ref 0.17–1.22)
MONOCYTES NFR BLD AUTO: 8 % (ref 4–12)
NEUTROPHILS # BLD AUTO: 2.09 THOUSANDS/ΜL (ref 1.85–7.62)
NEUTS SEG NFR BLD AUTO: 46 % (ref 43–75)
NONHDLC SERPL-MCNC: 101 MG/DL
NRBC BLD AUTO-RTO: 0 /100 WBCS
PLATELET # BLD AUTO: 259 THOUSANDS/UL (ref 149–390)
PMV BLD AUTO: 10.1 FL (ref 8.9–12.7)
POTASSIUM SERPL-SCNC: 4 MMOL/L (ref 3.5–5.3)
PROT SERPL-MCNC: 7.3 G/DL (ref 6.4–8.2)
RBC # BLD AUTO: 4.39 MILLION/UL (ref 3.81–5.12)
SODIUM SERPL-SCNC: 139 MMOL/L (ref 136–145)
TRIGL SERPL-MCNC: 42 MG/DL
TSH SERPL DL<=0.05 MIU/L-ACNC: 1.47 UIU/ML (ref 0.36–3.74)
WBC # BLD AUTO: 4.42 THOUSAND/UL (ref 4.31–10.16)

## 2020-07-17 PROCEDURE — 80053 COMPREHEN METABOLIC PANEL: CPT

## 2020-07-17 PROCEDURE — 82306 VITAMIN D 25 HYDROXY: CPT

## 2020-07-17 PROCEDURE — 36415 COLL VENOUS BLD VENIPUNCTURE: CPT

## 2020-07-17 PROCEDURE — 87389 HIV-1 AG W/HIV-1&-2 AB AG IA: CPT

## 2020-07-17 PROCEDURE — 84443 ASSAY THYROID STIM HORMONE: CPT

## 2020-07-17 PROCEDURE — 85025 COMPLETE CBC W/AUTO DIFF WBC: CPT

## 2020-07-17 PROCEDURE — 86803 HEPATITIS C AB TEST: CPT

## 2020-07-17 PROCEDURE — 80061 LIPID PANEL: CPT

## 2020-07-20 LAB — HIV 1+2 AB+HIV1 P24 AG SERPL QL IA: NORMAL

## 2020-07-28 ENCOUNTER — CLINICAL SUPPORT (OUTPATIENT)
Dept: FAMILY MEDICINE CLINIC | Facility: CLINIC | Age: 45
End: 2020-07-28

## 2020-07-28 VITALS — TEMPERATURE: 98.2 F | SYSTOLIC BLOOD PRESSURE: 124 MMHG | DIASTOLIC BLOOD PRESSURE: 72 MMHG

## 2020-07-28 DIAGNOSIS — I10 ESSENTIAL HYPERTENSION: Primary | ICD-10-CM

## 2020-07-28 NOTE — PROGRESS NOTES
Patient stated she increased Lisinopril on her own to 10 mg instead of 5 mg, has increased for 5 days because she could not get her blood pressure lower than 140  Today blood pressure was 124/72

## 2020-07-29 NOTE — PROGRESS NOTES
10 mg is a better dose for her   Please update med list and see if she needs refills (we can send in the 10 mg tablets instead)

## 2020-07-31 ENCOUNTER — TELEPHONE (OUTPATIENT)
Dept: FAMILY MEDICINE CLINIC | Facility: CLINIC | Age: 45
End: 2020-07-31

## 2020-07-31 NOTE — TELEPHONE ENCOUNTER
Patient called requesting that her work note be sent to her work email @ [email or web address], I sent the note via scanned email to her

## 2020-08-19 ENCOUNTER — PATIENT MESSAGE (OUTPATIENT)
Dept: FAMILY MEDICINE CLINIC | Facility: CLINIC | Age: 45
End: 2020-08-19

## 2020-08-19 DIAGNOSIS — Z11.1 SCREENING-PULMONARY TB: Primary | ICD-10-CM

## 2020-08-19 NOTE — TELEPHONE ENCOUNTER
From: Madan Hutchinson  To: Lyssa Claire DO  Sent: 8/19/2020 10:44 AM EDT  Subject: Non-Urgent Medical Question    Good Morning Dr Elvin Cannon    Would you be able to order a quant gold test for me? 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

## 2020-08-27 ENCOUNTER — LAB (OUTPATIENT)
Dept: LAB | Facility: AMBULARY SURGERY CENTER | Age: 45
End: 2020-08-27
Payer: COMMERCIAL

## 2020-08-27 DIAGNOSIS — Z11.1 SCREENING-PULMONARY TB: ICD-10-CM

## 2020-08-27 PROCEDURE — 36415 COLL VENOUS BLD VENIPUNCTURE: CPT

## 2020-08-27 PROCEDURE — 86480 TB TEST CELL IMMUN MEASURE: CPT

## 2020-08-28 ENCOUNTER — HOSPITAL ENCOUNTER (OUTPATIENT)
Dept: MAMMOGRAPHY | Facility: HOSPITAL | Age: 45
Discharge: HOME | End: 2020-08-28
Attending: OBSTETRICS & GYNECOLOGY
Payer: COMMERCIAL

## 2020-08-28 VITALS — BODY MASS INDEX: 30.43 KG/M2 | HEIGHT: 60 IN | WEIGHT: 155 LBS

## 2020-08-28 DIAGNOSIS — Z12.31 VISIT FOR SCREENING MAMMOGRAM: ICD-10-CM

## 2020-08-28 PROCEDURE — 77067 SCR MAMMO BI INCL CAD: CPT

## 2020-08-28 PROCEDURE — 77063 BREAST TOMOSYNTHESIS BI: CPT

## 2020-08-29 LAB
GAMMA INTERFERON BACKGROUND BLD IA-ACNC: 0.02 IU/ML
M TB IFN-G BLD-IMP: NEGATIVE
M TB IFN-G CD4+ BCKGRND COR BLD-ACNC: 0 IU/ML
M TB IFN-G CD4+ BCKGRND COR BLD-ACNC: 0 IU/ML
MITOGEN IGNF BCKGRD COR BLD-ACNC: >10 IU/ML

## 2020-09-01 DIAGNOSIS — R92.8 ABNORMAL MAMMOGRAM: Primary | ICD-10-CM

## 2020-09-11 ENCOUNTER — HOSPITAL ENCOUNTER (OUTPATIENT)
Dept: MAMMOGRAPHY | Facility: CLINIC | Age: 45
Discharge: HOME | End: 2020-09-11
Payer: COMMERCIAL

## 2020-09-11 ENCOUNTER — HOSPITAL ENCOUNTER (OUTPATIENT)
Dept: ULTRASOUND IMAGING | Facility: CLINIC | Age: 45
Discharge: HOME | End: 2020-09-11
Payer: COMMERCIAL

## 2020-09-11 VITALS — WEIGHT: 155 LBS | TEMPERATURE: 96.8 F | BODY MASS INDEX: 30.43 KG/M2 | HEIGHT: 60 IN

## 2020-09-11 DIAGNOSIS — R92.8 ABNORMAL MAMMOGRAM: ICD-10-CM

## 2020-09-11 PROCEDURE — 77066 DX MAMMO INCL CAD BI: CPT

## 2020-09-11 PROCEDURE — 76642 ULTRASOUND BREAST LIMITED: CPT

## 2020-09-11 PROCEDURE — G0279 TOMOSYNTHESIS, MAMMO: HCPCS

## 2020-10-13 DIAGNOSIS — N81.6 RECTOCELE: Primary | ICD-10-CM

## 2020-12-18 ENCOUNTER — ANESTHESIA EVENT (OUTPATIENT)
Dept: PERIOP | Facility: HOSPITAL | Age: 45
End: 2020-12-18
Payer: COMMERCIAL

## 2020-12-22 ENCOUNTER — HOSPITAL ENCOUNTER (OUTPATIENT)
Facility: HOSPITAL | Age: 45
Setting detail: OUTPATIENT SURGERY
Discharge: HOME | End: 2020-12-22
Attending: OBSTETRICS & GYNECOLOGY | Admitting: OBSTETRICS & GYNECOLOGY
Payer: COMMERCIAL

## 2020-12-22 ENCOUNTER — ANESTHESIA (OUTPATIENT)
Dept: PERIOP | Facility: HOSPITAL | Age: 45
End: 2020-12-22
Payer: COMMERCIAL

## 2020-12-22 VITALS
OXYGEN SATURATION: 98 % | HEART RATE: 64 BPM | SYSTOLIC BLOOD PRESSURE: 115 MMHG | HEIGHT: 60 IN | BODY MASS INDEX: 30.43 KG/M2 | DIASTOLIC BLOOD PRESSURE: 56 MMHG | RESPIRATION RATE: 14 BRPM | TEMPERATURE: 98.5 F | WEIGHT: 155 LBS

## 2020-12-22 VITALS — HEART RATE: 64 BPM

## 2020-12-22 DIAGNOSIS — N81.6 RECTOCELE: Primary | ICD-10-CM

## 2020-12-22 PROBLEM — Z98.890 PONV (POSTOPERATIVE NAUSEA AND VOMITING): Status: ACTIVE | Noted: 2020-12-22

## 2020-12-22 PROBLEM — R11.2 PONV (POSTOPERATIVE NAUSEA AND VOMITING): Status: ACTIVE | Noted: 2020-12-22

## 2020-12-22 LAB
EXT PREGNANCY TEST URINE: NEGATIVE
EXT. CONTROL: NORMAL

## 2020-12-22 PROCEDURE — 81025 URINE PREGNANCY TEST: CPT | Performed by: ANESTHESIOLOGY

## 2020-12-22 RX ORDER — KETOROLAC TROMETHAMINE 30 MG/ML
INJECTION, SOLUTION INTRAMUSCULAR; INTRAVENOUS AS NEEDED
Status: DISCONTINUED | OUTPATIENT
Start: 2020-12-22 | End: 2020-12-22

## 2020-12-22 RX ORDER — HYDROMORPHONE HCL/PF 1 MG/ML
0.5 SYRINGE (ML) INJECTION
Status: DISCONTINUED | OUTPATIENT
Start: 2020-12-22 | End: 2020-12-22 | Stop reason: HOSPADM

## 2020-12-22 RX ORDER — CEFAZOLIN SODIUM 2 G/50ML
2000 SOLUTION INTRAVENOUS ONCE
Status: COMPLETED | OUTPATIENT
Start: 2020-12-22 | End: 2020-12-22

## 2020-12-22 RX ORDER — MIDAZOLAM HYDROCHLORIDE 2 MG/2ML
INJECTION, SOLUTION INTRAMUSCULAR; INTRAVENOUS AS NEEDED
Status: DISCONTINUED | OUTPATIENT
Start: 2020-12-22 | End: 2020-12-22

## 2020-12-22 RX ORDER — ACETAMINOPHEN 325 MG/1
650 TABLET ORAL EVERY 6 HOURS PRN
Qty: 30 TABLET | Refills: 0
Start: 2020-12-22 | End: 2021-08-02 | Stop reason: ALTCHOICE

## 2020-12-22 RX ORDER — ACETAMINOPHEN 325 MG/1
650 TABLET ORAL EVERY 6 HOURS PRN
Status: DISCONTINUED | OUTPATIENT
Start: 2020-12-22 | End: 2020-12-22 | Stop reason: HOSPADM

## 2020-12-22 RX ORDER — IBUPROFEN 600 MG/1
600 TABLET ORAL EVERY 6 HOURS PRN
Qty: 30 TABLET | Refills: 0
Start: 2020-12-22 | End: 2021-08-02 | Stop reason: ALTCHOICE

## 2020-12-22 RX ORDER — SODIUM CHLORIDE, SODIUM LACTATE, POTASSIUM CHLORIDE, CALCIUM CHLORIDE 600; 310; 30; 20 MG/100ML; MG/100ML; MG/100ML; MG/100ML
125 INJECTION, SOLUTION INTRAVENOUS CONTINUOUS
Status: DISCONTINUED | OUTPATIENT
Start: 2020-12-22 | End: 2020-12-22 | Stop reason: HOSPADM

## 2020-12-22 RX ORDER — FENTANYL CITRATE 50 UG/ML
INJECTION, SOLUTION INTRAMUSCULAR; INTRAVENOUS AS NEEDED
Status: DISCONTINUED | OUTPATIENT
Start: 2020-12-22 | End: 2020-12-22

## 2020-12-22 RX ORDER — PROPOFOL 10 MG/ML
INJECTION, EMULSION INTRAVENOUS AS NEEDED
Status: DISCONTINUED | OUTPATIENT
Start: 2020-12-22 | End: 2020-12-22

## 2020-12-22 RX ORDER — IBUPROFEN 600 MG/1
600 TABLET ORAL EVERY 6 HOURS PRN
Status: DISCONTINUED | OUTPATIENT
Start: 2020-12-22 | End: 2020-12-22 | Stop reason: HOSPADM

## 2020-12-22 RX ORDER — ONDANSETRON 2 MG/ML
INJECTION INTRAMUSCULAR; INTRAVENOUS AS NEEDED
Status: DISCONTINUED | OUTPATIENT
Start: 2020-12-22 | End: 2020-12-22

## 2020-12-22 RX ORDER — SCOLOPAMINE TRANSDERMAL SYSTEM 1 MG/1
1 PATCH, EXTENDED RELEASE TRANSDERMAL ONCE AS NEEDED
Status: DISCONTINUED | OUTPATIENT
Start: 2020-12-22 | End: 2020-12-22 | Stop reason: HOSPADM

## 2020-12-22 RX ORDER — ONDANSETRON 2 MG/ML
4 INJECTION INTRAMUSCULAR; INTRAVENOUS ONCE AS NEEDED
Status: DISCONTINUED | OUTPATIENT
Start: 2020-12-22 | End: 2020-12-22 | Stop reason: HOSPADM

## 2020-12-22 RX ORDER — ONDANSETRON 2 MG/ML
4 INJECTION INTRAMUSCULAR; INTRAVENOUS EVERY 6 HOURS PRN
Status: DISCONTINUED | OUTPATIENT
Start: 2020-12-22 | End: 2020-12-22 | Stop reason: HOSPADM

## 2020-12-22 RX ORDER — DOCUSATE SODIUM 100 MG/1
100 CAPSULE, LIQUID FILLED ORAL 2 TIMES DAILY
Qty: 10 CAPSULE | Refills: 0
Start: 2020-12-22 | End: 2022-08-05

## 2020-12-22 RX ORDER — FENTANYL CITRATE/PF 50 MCG/ML
25 SYRINGE (ML) INJECTION
Status: DISCONTINUED | OUTPATIENT
Start: 2020-12-22 | End: 2020-12-22 | Stop reason: HOSPADM

## 2020-12-22 RX ORDER — MEPERIDINE HYDROCHLORIDE 25 MG/ML
12.5 INJECTION INTRAMUSCULAR; INTRAVENOUS; SUBCUTANEOUS
Status: DISCONTINUED | OUTPATIENT
Start: 2020-12-22 | End: 2020-12-22 | Stop reason: HOSPADM

## 2020-12-22 RX ORDER — DEXAMETHASONE SODIUM PHOSPHATE 10 MG/ML
INJECTION, SOLUTION INTRAMUSCULAR; INTRAVENOUS AS NEEDED
Status: DISCONTINUED | OUTPATIENT
Start: 2020-12-22 | End: 2020-12-22

## 2020-12-22 RX ADMIN — SODIUM CHLORIDE, SODIUM LACTATE, POTASSIUM CHLORIDE, AND CALCIUM CHLORIDE: .6; .31; .03; .02 INJECTION, SOLUTION INTRAVENOUS at 14:07

## 2020-12-22 RX ADMIN — MIDAZOLAM 2 MG: 1 INJECTION INTRAMUSCULAR; INTRAVENOUS at 13:11

## 2020-12-22 RX ADMIN — FENTANYL CITRATE 25 MCG: 50 INJECTION, SOLUTION INTRAMUSCULAR; INTRAVENOUS at 13:42

## 2020-12-22 RX ADMIN — KETOROLAC TROMETHAMINE 30 MG: 30 INJECTION, SOLUTION INTRAMUSCULAR at 14:26

## 2020-12-22 RX ADMIN — SCOPALAMINE 1 PATCH: 1 PATCH, EXTENDED RELEASE TRANSDERMAL at 11:26

## 2020-12-22 RX ADMIN — CEFAZOLIN SODIUM 2000 MG: 2 SOLUTION INTRAVENOUS at 12:55

## 2020-12-22 RX ADMIN — FENTANYL CITRATE 50 MCG: 50 INJECTION, SOLUTION INTRAMUSCULAR; INTRAVENOUS at 13:11

## 2020-12-22 RX ADMIN — FENTANYL CITRATE 25 MCG: 50 INJECTION, SOLUTION INTRAMUSCULAR; INTRAVENOUS at 13:30

## 2020-12-22 RX ADMIN — PROPOFOL 200 MG: 10 INJECTION, EMULSION INTRAVENOUS at 13:11

## 2020-12-22 RX ADMIN — SODIUM CHLORIDE, SODIUM LACTATE, POTASSIUM CHLORIDE, AND CALCIUM CHLORIDE 125 ML/HR: .6; .31; .03; .02 INJECTION, SOLUTION INTRAVENOUS at 11:20

## 2020-12-22 RX ADMIN — ONDANSETRON 4 MG: 2 INJECTION INTRAMUSCULAR; INTRAVENOUS at 13:19

## 2020-12-22 RX ADMIN — DEXAMETHASONE SODIUM PHOSPHATE 4 MG: 10 INJECTION, SOLUTION INTRAMUSCULAR; INTRAVENOUS at 13:19

## 2021-01-06 PROBLEM — I10 BENIGN ESSENTIAL HYPERTENSION: Status: ACTIVE | Noted: 2021-01-06

## 2021-01-15 DIAGNOSIS — I10 BENIGN ESSENTIAL HYPERTENSION: ICD-10-CM

## 2021-01-15 RX ORDER — LISINOPRIL 10 MG/1
10 TABLET ORAL DAILY
Qty: 90 TABLET | Refills: 1 | Status: SHIPPED | OUTPATIENT
Start: 2021-01-15 | End: 2021-08-02

## 2021-01-15 NOTE — TELEPHONE ENCOUNTER
Medication: Lisinopril 10 mg   Last refilled: 7/29/20  Last Office Visit: 6/26/20  Next Office Visit: N/A  Pharmacy:

## 2021-03-22 ENCOUNTER — TELEMEDICINE (OUTPATIENT)
Dept: FAMILY MEDICINE CLINIC | Facility: CLINIC | Age: 46
End: 2021-03-22
Payer: COMMERCIAL

## 2021-03-22 VITALS
WEIGHT: 155 LBS | TEMPERATURE: 98.1 F | HEIGHT: 60 IN | SYSTOLIC BLOOD PRESSURE: 167 MMHG | DIASTOLIC BLOOD PRESSURE: 89 MMHG | BODY MASS INDEX: 30.43 KG/M2 | HEART RATE: 81 BPM

## 2021-03-22 DIAGNOSIS — Z20.822 EXPOSURE TO COVID-19 VIRUS: ICD-10-CM

## 2021-03-22 DIAGNOSIS — Z20.822 EXPOSURE TO COVID-19 VIRUS: Primary | ICD-10-CM

## 2021-03-22 PROCEDURE — U0005 INFEC AGEN DETEC AMPLI PROBE: HCPCS | Performed by: FAMILY MEDICINE

## 2021-03-22 PROCEDURE — 99214 OFFICE O/P EST MOD 30 MIN: CPT | Performed by: FAMILY MEDICINE

## 2021-03-22 PROCEDURE — U0003 INFECTIOUS AGENT DETECTION BY NUCLEIC ACID (DNA OR RNA); SEVERE ACUTE RESPIRATORY SYNDROME CORONAVIRUS 2 (SARS-COV-2) (CORONAVIRUS DISEASE [COVID-19]), AMPLIFIED PROBE TECHNIQUE, MAKING USE OF HIGH THROUGHPUT TECHNOLOGIES AS DESCRIBED BY CMS-2020-01-R: HCPCS | Performed by: FAMILY MEDICINE

## 2021-03-22 NOTE — PROGRESS NOTES
COVID-19 Virtual Visit     Assessment/Plan:    Problem List Items Addressed This Visit     None      Visit Diagnoses     Exposure to COVID-19 virus    -  Primary    Relevant Orders    Novel Coronavirus (Covid-19),PCR [facility] - Collected at Mobile V[facility] or [facility]         Disposition:     asymptomatic  Send for covid testing today - if negative may return to work 3/26/21 per employee health   If symptoms start call so we can test her again    I have spent 10 minutes directly with the patient  BMI Counseling: Body mass index is 30 27 kg/m²  The BMI is above normal  Nutrition recommendations include reducing portion sizes  Exercise recommendations include moderate aerobic physical activity for 150 minutes/week  Encounter provider Mely Romero DO    Provider located at [address] [place] [address],[address]  [address]  [facility] [id number] [phone]    Recent Visits  No visits were found meeting these conditions  Showing recent visits within past 7 days and meeting all other requirements     Today's Visits  Date Type Provider Dept   03/22/21 Telemedicine Malissa Shell [place] today's visits and meeting all other requirements     Future Appointments  No visits were found meeting these conditions  Showing future appointments within next 150 days and meeting all other requirements      This virtual check-in was done via [employer] and patient was informed that this is a secure, HIPAA-compliant platform  She agrees to proceed  Patient agrees to participate in a virtual check in via telephone or video visit instead of presenting to the office to address urgent/immediate medical needs  Patient is aware this is a billable service  After connecting through [facility], the patient was identified by name and date of birth  Jodi Scott was informed that this was a telemedicine visit and that the exam was being conducted confidentially over secure lines   My office door was closed  No one else was in the room  Audrey Henry acknowledged consent and understanding of privacy and security of the telemedicine visit  I informed the patient that I have reviewed her record in Epic and presented the opportunity for her to ask any questions regarding the visit today  The patient agreed to participate  Subjective:   Audrey Henry is a 40 y o  female who is concerned about COVID-19  Patient denies fever, chills, fatigue, malaise, congestion, rhinorrhea, sore throat, anosmia, loss of taste, cough, shortness of breath, chest tightness, abdominal pain, nausea, vomiting, diarrhea, myalgias and headaches       Date of exposure: 3/19/2021    Exposure:   Contact with a person who is under investigation (PUI) for or who is positive for COVID-19 within the last 14 days?: Yes    Hospitalized recently for fever and/or lower respiratory symptoms?: No      Currently a healthcare worker that is involved in direct patient care?: No      Works in a special setting where the risk of COVID-19 transmission may be high? (this may include long-term care, correctional and assisted facilities; homeless shelters; assisted-living facilities and group homes ): No      Resident in a special setting where the risk of COVID-19 transmission may be high? (this may include long-term care, correctional and assisted facilities; homeless shelters; assisted-living facilities and group homes ): No      Positive exposure 3/20/21 asymptomatic,  covid+ on 3/19/21)  He started Wednesday with symptoms, fever Thursday with abdominal pain   Repeat blood pressure 144/84, heart rate 76   Was told to return on Friday if negative 3/26/21     No results found for: Rebekah Hyman  Past Medical History:   Diagnosis Date    Dizziness     Hypertension     Infertility, female     Multiple thyroid nodules     Obesity     Resolved 06/03/2016    Palpitations     Palpitations Resolved 2016    PONV (postoperative nausea and vomiting)     Thyroid nodule     Last Assessed: 2014     Thyroid nodule 3/31/2019    Vertigo      Past Surgical History:   Procedure Laterality Date    BREAST BIOPSY Right     Percutaneous Needle Core Right      SECTION      CHOLECYSTECTOMY      Laparoscopic     COLPORRHAPHY N/A 2020    Procedure: POST  COLPORRHAPHY;  Surgeon: Kaylen Barajas MD;  Location: [facility];  Service: UroGynecology           ENDOMETRIAL ABLATION      TUBAL LIGATION      WISDOM TOOTH EXTRACTION       Current Outpatient Medications   Medication Sig Dispense Refill    acetaminophen (TYLENOL) 325 mg tablet Take 2 tablets (650 mg total) by mouth every 6 (six) hours as needed for mild pain 30 tablet 0    docusate sodium (COLACE) 100 mg capsule Take 1 capsule (100 mg total) by mouth 2 (two) times a day 10 capsule 0    ibuprofen (MOTRIN) 600 mg tablet Take 1 tablet (600 mg total) by mouth every 6 (six) hours as needed for mild pain 30 tablet 0    lisinopril (ZESTRIL) 10 mg tablet Take 1 tablet (10 mg total) by mouth daily 90 tablet 1    Vitamin D, Cholecalciferol, 50 MCG (2000 UT) CAPS Take 5,000 Units by mouth 2 (two) times a week        No current facility-administered medications for this visit  No Known Allergies    Review of Systems   Constitutional: Negative for chills, fatigue and fever  HENT: Negative for congestion, rhinorrhea and sore throat  Respiratory: Negative for cough, chest tightness and shortness of breath  Gastrointestinal: Negative for abdominal pain, diarrhea, nausea and vomiting  Musculoskeletal: Negative for myalgias  Neurological: Negative for headaches  Objective:    Vitals:    21 1353   BP: 167/89   Pulse: 81   Temp: 98 1 °F (36 7 °C)   Weight: 70 3 kg (155 lb)   Height: 5' (1 524 m)       Physical Exam  HENT:      Head: Normocephalic        Right Ear: External ear normal       Left Ear: External ear normal       Nose: Nose normal    Eyes:      Extraocular Movements: Extraocular movements intact  Neck:      Musculoskeletal: No neck rigidity  Pulmonary:      Effort: Pulmonary effort is normal  No respiratory distress  Skin:     Findings: No rash (on face)  Neurological:      Mental Status: She is alert and oriented to person, place, and time  Psychiatric:         Mood and Affect: Mood normal          Behavior: Behavior normal          Thought Content: Thought content normal          Judgment: Judgment normal        VIRTUAL VISIT DISCLAIMER    Radha Umaña acknowledges that she has consented to an online visit or consultation  She understands that the online visit is based solely on information provided by her, and that, in the absence of a face-to-face physical evaluation by the physician, the diagnosis she receives is both limited and provisional in terms of accuracy and completeness  This is not intended to replace a full medical face-to-face evaluation by the physician  Param Mccann understands and accepts these terms

## 2021-03-23 LAB — SARS-COV-2 RNA RESP QL NAA+PROBE: NEGATIVE

## 2021-04-16 DIAGNOSIS — Z12.31 ENCOUNTER FOR SCREENING MAMMOGRAM FOR BREAST CANCER: Primary | ICD-10-CM

## 2021-05-28 ENCOUNTER — ANNUAL EXAM (OUTPATIENT)
Dept: OBGYN CLINIC | Facility: CLINIC | Age: 46
End: 2021-05-28
Payer: COMMERCIAL

## 2021-05-28 VITALS — HEIGHT: 60 IN | BODY MASS INDEX: 30.27 KG/M2 | SYSTOLIC BLOOD PRESSURE: 126 MMHG | DIASTOLIC BLOOD PRESSURE: 82 MMHG

## 2021-05-28 DIAGNOSIS — Z12.11 SCREENING FOR COLORECTAL CANCER: ICD-10-CM

## 2021-05-28 DIAGNOSIS — Z01.419 ENCOUNTER FOR GYNECOLOGICAL EXAMINATION WITHOUT ABNORMAL FINDING: Primary | ICD-10-CM

## 2021-05-28 DIAGNOSIS — Z12.4 PAP SMEAR FOR CERVICAL CANCER SCREENING: ICD-10-CM

## 2021-05-28 DIAGNOSIS — Z12.12 SCREENING FOR COLORECTAL CANCER: ICD-10-CM

## 2021-05-28 PROCEDURE — 99396 PREV VISIT EST AGE 40-64: CPT | Performed by: OBSTETRICS & GYNECOLOGY

## 2021-05-28 PROCEDURE — G0145 SCR C/V CYTO,THINLAYER,RESCR: HCPCS | Performed by: OBSTETRICS & GYNECOLOGY

## 2021-05-28 PROCEDURE — 87624 HPV HI-RISK TYP POOLED RSLT: CPT | Performed by: OBSTETRICS & GYNECOLOGY

## 2021-05-28 RX ORDER — RIBOFLAVIN (VITAMIN B2) 100 MG
100 TABLET ORAL DAILY
COMMUNITY

## 2021-05-28 NOTE — PROGRESS NOTES
Christianne Akins is a 40 y o    female who presents for annual well woman exam  Periods are regular every 28-30 days, lasting 2 days  Mostly spotting since ablation  No intermenstrual bleeding, spotting, or discharge  Patient reports No hot flashes/night sweats  Possibly sometimes warm at night, No pain problems intercourse, Yes  vaginal dryness, sleeping fairly well  Current contraception: tubal ligation  History of abnormal Pap smear: no  Family history of uterine or ovarian cancer: no  Regular self breast exam: yes  History of abnormal mammogram: yes -  Fibrocystic breast with cysts  Family history of breast cancer: yes -  Paternal cousin in late 45s    Menstrual History:  OB History        5    Para   3    Term   3            AB        Living   3       SAB        TAB        Ectopic        Multiple        Live Births   3           Obstetric Comments   Menarche at age 7  First pregnancy age 26  Hx of BCP and Depoprovera use  Menarche age: 7  No LMP recorded  The following portions of the patient's history were reviewed and updated as appropriate: allergies, current medications, past family history, past medical history, past social history, past surgical history and problem list   Past Medical History:   Diagnosis Date    Dizziness     Hypertension     Infertility, female     Multiple thyroid nodules     Obesity     Resolved 2016    Palpitations     Palpitations     Resolved 2016    PONV (postoperative nausea and vomiting)     Thyroid nodule     Last Assessed: 2014     Thyroid nodule 3/31/2019    Vertigo      Past Surgical History:   Procedure Laterality Date    BREAST BIOPSY Right     Percutaneous Needle Core Right      SECTION      CHOLECYSTECTOMY      Laparoscopic     COLPORRHAPHY N/A 2020    Procedure: POST   COLPORRHAPHY;  Surgeon: Lina Pierce MD;  Location: [facility] OR;  Service: UroGynecology           ENDOMETRIAL ABLATION  2012    TUBAL LIGATION      WISDOM TOOTH EXTRACTION       OB History        9    Para   3    Term   3            AB        Living   3       SAB        TAB        Ectopic        Multiple        Live Births   3           Obstetric Comments   Menarche at age 7  First pregnancy age 26  Hx of BCP and Depoprovera use  Review of Systems  Review of Systems   Constitutional: Negative for chills, fatigue, fever and unexpected weight change  HENT: Negative for dental problem, sinus pressure and sinus pain  Eyes: Negative for visual disturbance  Respiratory: Negative for cough, shortness of breath and wheezing  Cardiovascular: Negative for chest pain and leg swelling  Gastrointestinal: Negative for constipation, diarrhea, nausea and vomiting  Genitourinary: Negative for urgency  Musculoskeletal: Negative for back pain and joint swelling  Allergic/Immunologic: Negative for environmental allergies  Neurological: Negative for dizziness and headaches  Psychiatric/Behavioral: The patient is not nervous/anxious                Objective     Vitals:    21 1303   BP: 126/82   BP Location: Left arm   Patient Position: Sitting   Cuff Size: Standard   Height: 5' (1 524 m)       General:   alert and oriented, in no acute distress   Heart: regular rate and rhythm, S1, S2 normal, no murmur, click, rub or gallop   Lungs: clear to auscultation bilaterally   Abdomen: soft, non-tender, without masses or organomegaly   Vulva: normal   Vagina: normal mucosa,  Posterior vagina with very well-healed on palpation there is some firm supportive tissue along the midline evidence of the prior repair   Cervix: no bleeding following Pap, no cervical motion tenderness and no lesions   Uterus: normal size, mobile, non-tender   Adnexa: normal adnexa and no mass, fullness, tenderness   Breast inspection negative, no nipple discharge or bleeding, no masses or nodularity palpable and thicker fibrocystic tissue right greater than left in the upper outer quadrant  Rectal negative, stool guaiac negative  Thyroid normal no masses or nodules     Assessment    40year-old  here for annual exam menses are well controlled since ablation  Discussed colorectal screening last Pap  normal last mammogram  normal with additional imaging     Plan    patient is scheduled for mammogram this October, thin prep with Co testing performed, will order Cologuard for patient given instructions,  Reviewed options for  genetic cancer screening  Return in 1 year or sooner as needed

## 2021-06-01 LAB
HPV HR 12 DNA CVX QL NAA+PROBE: NEGATIVE
HPV16 DNA CVX QL NAA+PROBE: NEGATIVE
HPV18 DNA CVX QL NAA+PROBE: NEGATIVE

## 2021-06-04 LAB
LAB AP GYN PRIMARY INTERPRETATION: NORMAL
Lab: NORMAL

## 2021-07-25 ENCOUNTER — TELEPHONE (OUTPATIENT)
Dept: FAMILY MEDICINE CLINIC | Facility: CLINIC | Age: 46
End: 2021-07-25

## 2021-07-25 DIAGNOSIS — Z13.1 SCREENING FOR DIABETES MELLITUS: ICD-10-CM

## 2021-07-25 DIAGNOSIS — I10 BENIGN ESSENTIAL HYPERTENSION: Primary | ICD-10-CM

## 2021-07-25 DIAGNOSIS — E04.1 THYROID NODULE: ICD-10-CM

## 2021-07-25 DIAGNOSIS — R79.89 LOW VITAMIN D LEVEL: ICD-10-CM

## 2021-07-27 NOTE — TELEPHONE ENCOUNTER
Left detailed message (Khushboo Otero per communication form in chart ) informing patient  and to call the office with any further questions or concerns

## 2021-07-31 ENCOUNTER — APPOINTMENT (OUTPATIENT)
Dept: LAB | Facility: CLINIC | Age: 46
End: 2021-07-31
Payer: COMMERCIAL

## 2021-07-31 DIAGNOSIS — Z13.1 SCREENING FOR DIABETES MELLITUS: ICD-10-CM

## 2021-07-31 DIAGNOSIS — R79.89 LOW VITAMIN D LEVEL: ICD-10-CM

## 2021-07-31 DIAGNOSIS — E04.1 THYROID NODULE: ICD-10-CM

## 2021-07-31 DIAGNOSIS — I10 BENIGN ESSENTIAL HYPERTENSION: ICD-10-CM

## 2021-07-31 LAB
25(OH)D3 SERPL-MCNC: 19.2 NG/ML (ref 30–100)
ALBUMIN SERPL BCP-MCNC: 4 G/DL (ref 3.5–5)
ALP SERPL-CCNC: 58 U/L (ref 46–116)
ALT SERPL W P-5'-P-CCNC: 21 U/L (ref 12–78)
ANION GAP SERPL CALCULATED.3IONS-SCNC: 8 MMOL/L (ref 4–13)
AST SERPL W P-5'-P-CCNC: 13 U/L (ref 5–45)
BILIRUB SERPL-MCNC: 0.51 MG/DL (ref 0.2–1)
BUN SERPL-MCNC: 20 MG/DL (ref 5–25)
CALCIUM SERPL-MCNC: 9.3 MG/DL (ref 8.3–10.1)
CHLORIDE SERPL-SCNC: 104 MMOL/L (ref 100–108)
CHOLEST SERPL-MCNC: 189 MG/DL (ref 50–200)
CO2 SERPL-SCNC: 26 MMOL/L (ref 21–32)
CREAT SERPL-MCNC: 0.78 MG/DL (ref 0.6–1.3)
EST. AVERAGE GLUCOSE BLD GHB EST-MCNC: 103 MG/DL
GFR SERPL CREATININE-BSD FRML MDRD: 92 ML/MIN/1.73SQ M
GLUCOSE P FAST SERPL-MCNC: 95 MG/DL (ref 65–99)
HBA1C MFR BLD: 5.2 %
HDLC SERPL-MCNC: 67 MG/DL
LDLC SERPL CALC-MCNC: 112 MG/DL (ref 0–100)
NONHDLC SERPL-MCNC: 122 MG/DL
POTASSIUM SERPL-SCNC: 4.2 MMOL/L (ref 3.5–5.3)
PROT SERPL-MCNC: 7.9 G/DL (ref 6.4–8.2)
SODIUM SERPL-SCNC: 138 MMOL/L (ref 136–145)
TRIGL SERPL-MCNC: 48 MG/DL
TSH SERPL DL<=0.05 MIU/L-ACNC: 1.25 UIU/ML (ref 0.36–3.74)

## 2021-07-31 PROCEDURE — 83036 HEMOGLOBIN GLYCOSYLATED A1C: CPT

## 2021-07-31 PROCEDURE — 80053 COMPREHEN METABOLIC PANEL: CPT

## 2021-07-31 PROCEDURE — 82306 VITAMIN D 25 HYDROXY: CPT

## 2021-07-31 PROCEDURE — 80061 LIPID PANEL: CPT

## 2021-07-31 PROCEDURE — 36415 COLL VENOUS BLD VENIPUNCTURE: CPT

## 2021-07-31 PROCEDURE — 84443 ASSAY THYROID STIM HORMONE: CPT

## 2021-08-02 ENCOUNTER — OFFICE VISIT (OUTPATIENT)
Dept: FAMILY MEDICINE CLINIC | Facility: CLINIC | Age: 46
End: 2021-08-02
Payer: COMMERCIAL

## 2021-08-02 VITALS
OXYGEN SATURATION: 99 % | TEMPERATURE: 97.6 F | RESPIRATION RATE: 14 BRPM | DIASTOLIC BLOOD PRESSURE: 78 MMHG | HEART RATE: 78 BPM | BODY MASS INDEX: 32.63 KG/M2 | HEIGHT: 60 IN | SYSTOLIC BLOOD PRESSURE: 110 MMHG | WEIGHT: 166.2 LBS

## 2021-08-02 DIAGNOSIS — E66.09 CLASS 1 OBESITY DUE TO EXCESS CALORIES WITH SERIOUS COMORBIDITY AND BODY MASS INDEX (BMI) OF 32.0 TO 32.9 IN ADULT: ICD-10-CM

## 2021-08-02 DIAGNOSIS — E55.9 VITAMIN D DEFICIENCY: ICD-10-CM

## 2021-08-02 DIAGNOSIS — Z12.11 SCREENING FOR COLON CANCER: ICD-10-CM

## 2021-08-02 DIAGNOSIS — G56.01 CARPAL TUNNEL SYNDROME OF RIGHT WRIST: ICD-10-CM

## 2021-08-02 DIAGNOSIS — Z00.00 WELL ADULT EXAM: Primary | ICD-10-CM

## 2021-08-02 DIAGNOSIS — I10 BENIGN ESSENTIAL HYPERTENSION: ICD-10-CM

## 2021-08-02 DIAGNOSIS — E04.1 THYROID NODULE: ICD-10-CM

## 2021-08-02 PROCEDURE — 99396 PREV VISIT EST AGE 40-64: CPT | Performed by: FAMILY MEDICINE

## 2021-08-02 RX ORDER — LISINOPRIL 10 MG/1
10 TABLET ORAL DAILY
Qty: 90 TABLET | Refills: 2 | Status: SHIPPED | OUTPATIENT
Start: 2021-08-02 | End: 2022-06-06 | Stop reason: SDUPTHER

## 2021-08-02 RX ORDER — ERGOCALCIFEROL 1.25 MG/1
50000 CAPSULE ORAL WEEKLY
Qty: 12 CAPSULE | Refills: 0 | Status: SHIPPED | OUTPATIENT
Start: 2021-08-02 | End: 2021-11-13 | Stop reason: SDUPTHER

## 2021-08-02 NOTE — PROGRESS NOTES
Assessment/Plan:     1  Well adult exam  See below     2  Benign essential hypertension  Well controlled   - lisinopril (ZESTRIL) 10 mg tablet; Take 1 tablet (10 mg total) by mouth daily  Dispense: 90 tablet; Refill: 2    3  Thyroid nodule  US up to date     4  Vitamin D deficiency  Not well controlled despite 5000 iu daily   Prescription:  - ergocalciferol (VITAMIN D2) 50,000 units; Take 1 capsule (50,000 Units total) by mouth once a week After 12 weeks get blood work to determine your next dose  Dispense: 12 capsule; Refill: 0  - Vitamin D 25 hydroxy; Future  Update labs in 3 months - consider long term prescription therapy     5  Class 1 obesity due to excess calories with serious comorbidity and body mass index (BMI) of 32 0 to 32 9 in adult  Encouraged diet and exercise to help for a healthier BMI  6  Carpal tunnel syndrome of right wrist  Refer to hand surgeon  - Ambulatory referral to Orthopedic Surgery; Future    7  Screening for colon cancer  Will send for early screening if insurance covers   - Ambulatory referral to Gastroenterology; Future      Well adult exam  ·         Continue healthy diet   ·         Encourage exercise 4 times a week or more for minimum 30 minutes  ·         Continue to see dentist, wear seatbelt  ·         Health maintenance reviewed and up-to-date  Consider COVID vaccine but she wishes to wait for more data  Reviewed age appropriate health maintenance screenings and immunizations that are due, risks and benefits of these     Health Maintenance   Topic Date Due    COVID-19 Vaccine (1) Never done    Influenza Vaccine (1) 09/01/2021    Breast Cancer Screening: Mammogram  09/11/2021    BMI: Followup Plan  03/22/2022    Annual Physical  05/28/2022    Depression Screening PHQ  08/02/2022    BMI: Adult  08/02/2022    Cervical Cancer Screening  05/28/2026    DTaP,Tdap,and Td Vaccines (3 - Td or Tdap) 02/17/2027    HIV Screening  Completed    Hepatitis C Screening Completed    Pneumococcal Vaccine: Pediatrics (0 to 5 Years) and At-Risk Patients (6 to 59 Years)  Aged Out    HIB Vaccine  Aged Out    Hepatitis B Vaccine  Aged Out    IPV Vaccine  Aged Out    Hepatitis A Vaccine  Aged Out    Meningococcal ACWY Vaccine  Aged Out    HPV Vaccine  Aged Out     No follow-ups on file  Subjective:    KORIN Garcia is a 40 y o  female who presents today for a physical      Chief Complaint   Patient presents with    Physical Exam     PHQ-9 Depression Screening    PHQ-9:   Frequency of the following problems over the past two weeks:      Little interest or pleasure in doing things: 0 - not at all  Feeling down, depressed, or hopeless: 0 - not at all  PHQ-2 Score: 0        ---Above per clinical staff & reviewed  ---  Patient here today for a physical:    Diet: healthy   Exercise:  Yes - twice a week       Concerns today:  Every night both hands go numbm right worse  Last week at work shooting pain into elbow   Whole hands  Wakes her from her sleep  Has a splint and wears it at night                 The following portions of the patient's history were reviewed and updated as appropriate: allergies, current medications, past family history, past medical history, past social history, past surgical history and problem list      Current Medications:  Current Outpatient Medications   Medication Sig Dispense Refill    Ascorbic Acid (vitamin C) 100 MG tablet Take 100 mg by mouth daily      docusate sodium (COLACE) 100 mg capsule Take 1 capsule (100 mg total) by mouth 2 (two) times a day 10 capsule 0    Multiple Vitamins-Minerals (ZINC PO) Take by mouth      VITAMIN A PO Take by mouth      Vitamin D, Cholecalciferol, 50 MCG (2000 UT) CAPS Take 5,000 Units by mouth 2 (two) times a week       acetaminophen (TYLENOL) 325 mg tablet Take 2 tablets (650 mg total) by mouth every 6 (six) hours as needed for mild pain 30 tablet 0    ibuprofen (MOTRIN) 600 mg tablet Take 1 tablet (600 mg total) by mouth every 6 (six) hours as needed for mild pain (Patient not taking: Reported on 5/28/2021) 30 tablet 0     No current facility-administered medications for this visit  Objective:      /78   Pulse 78   Temp 97 6 °F (36 4 °C)   Resp 14   Ht 5' 0 39" (1 534 m)   Wt 75 4 kg (166 lb 3 2 oz)   SpO2 99%   BMI 32 04 kg/m²   BP Readings from Last 3 Encounters:   08/02/21 110/78   05/28/21 126/82   03/22/21 167/89     Wt Readings from Last 3 Encounters:   08/02/21 75 4 kg (166 lb 3 2 oz)   03/22/21 70 3 kg (155 lb)   12/22/20 70 3 kg (155 lb)       Review of Systems  ROS:  all others negative - no chest pain, SOB, normal urine and bowels  no GERD  sleeping well  mood good  Physical Exam   Constitutional: she appears well-developed and well-nourished  HENT: Head: Normocephalic  Right Ear: External ear normal  Tympanic membrane normal    Left Ear: External ear normal  Tympanic membrane normal    Nose: Nose normal  No mucosal edema, No rhinorrhea  Right sinus exhibits no maxillary sinus tenderness  Left sinus exhibits no maxillary sinus tenderness  Mouth/Throat: Oropharynx is clear and moist    Eyes: Normal conjunctiva  No erythema  No discharge  Neck: No pain on exam  Neck supple  Cardiovascular: Normal rate, regular rhythm and normal heart sounds  Pulmonary/Chest: Effort normal and breath sounds normal  No wheezes  No rales  No rhonchi  Abdominal: Soft  Bowel sounds are normal  There is no tenderness  Musculoskeletal: she exhibits no edema  Lymphadenopathy: she has no cervical adenopathy  Neurological: she  is alert and oriented to person, place, and time  Skin: Skin is warm and dry  No rashes  Psychiatric: she  has a normal mood and affect  her behavior is normal  Thought content normal    Vitals reviewed  BMI Counseling: Body mass index is 32 04 kg/m²  The BMI is above normal  Nutrition recommendations include decreasing portion sizes   Exercise recommendations include exercising 3-5 times per week

## 2021-08-17 ENCOUNTER — PATIENT MESSAGE (OUTPATIENT)
Dept: FAMILY MEDICINE CLINIC | Facility: CLINIC | Age: 46
End: 2021-08-17

## 2021-08-17 DIAGNOSIS — Z01.84 IMMUNITY STATUS TESTING: ICD-10-CM

## 2021-08-17 DIAGNOSIS — Z11.59 NEED FOR HEPATITIS B SCREENING TEST: Primary | ICD-10-CM

## 2021-08-17 DIAGNOSIS — Z11.1 SCREENING-PULMONARY TB: ICD-10-CM

## 2021-08-18 DIAGNOSIS — Z86.69 HISTORY OF MIGRAINE HEADACHES: Primary | ICD-10-CM

## 2021-08-18 RX ORDER — SUMATRIPTAN 25 MG/1
25 TABLET, FILM COATED ORAL ONCE AS NEEDED
Qty: 12 TABLET | Refills: 2 | Status: SHIPPED | OUTPATIENT
Start: 2021-08-18 | End: 2022-08-05

## 2021-08-24 ENCOUNTER — TELEPHONE (OUTPATIENT)
Dept: LAB | Facility: HOSPITAL | Age: 46
End: 2021-08-24

## 2021-08-26 ENCOUNTER — APPOINTMENT (OUTPATIENT)
Dept: LAB | Facility: CLINIC | Age: 46
End: 2021-08-26
Payer: COMMERCIAL

## 2021-08-26 DIAGNOSIS — Z11.59 NEED FOR HEPATITIS B SCREENING TEST: ICD-10-CM

## 2021-08-26 DIAGNOSIS — Z01.84 IMMUNITY STATUS TESTING: ICD-10-CM

## 2021-08-26 PROCEDURE — 86762 RUBELLA ANTIBODY: CPT

## 2021-08-26 PROCEDURE — 86765 RUBEOLA ANTIBODY: CPT

## 2021-08-26 PROCEDURE — 86706 HEP B SURFACE ANTIBODY: CPT

## 2021-08-26 PROCEDURE — 86735 MUMPS ANTIBODY: CPT

## 2021-08-27 LAB
HBV SURFACE AB SER-ACNC: 176.96 MIU/ML
RUBV IGG SERPL IA-ACNC: 45.9 IU/ML

## 2021-08-28 LAB — RUBV IGM SER IA-ACNC: <20 AU/ML (ref 0–19.9)

## 2021-08-30 LAB
MEV IGG SER QL: NORMAL
MUV IGG SER QL: NORMAL

## 2021-08-31 LAB
MEV IGM SER-ACNC: <0.91 ISR (ref 0–0.9)
MUV IGM SER QL: <0.8 AU (ref 0–0.79)

## 2021-09-16 ENCOUNTER — CONSULT (OUTPATIENT)
Dept: OBGYN CLINIC | Facility: CLINIC | Age: 46
End: 2021-09-16
Payer: COMMERCIAL

## 2021-09-16 VITALS
WEIGHT: 164 LBS | DIASTOLIC BLOOD PRESSURE: 82 MMHG | HEIGHT: 60 IN | BODY MASS INDEX: 32.2 KG/M2 | HEART RATE: 68 BPM | SYSTOLIC BLOOD PRESSURE: 138 MMHG

## 2021-09-16 DIAGNOSIS — M77.11 LATERAL EPICONDYLITIS OF RIGHT ELBOW: ICD-10-CM

## 2021-09-16 DIAGNOSIS — G56.02 CARPAL TUNNEL SYNDROME ON LEFT: Primary | ICD-10-CM

## 2021-09-16 DIAGNOSIS — G56.01 CARPAL TUNNEL SYNDROME OF RIGHT WRIST: ICD-10-CM

## 2021-09-16 DIAGNOSIS — G56.20 ULNAR NEURITIS, UNSPECIFIED LATERALITY: ICD-10-CM

## 2021-09-16 PROCEDURE — 99244 OFF/OP CNSLTJ NEW/EST MOD 40: CPT | Performed by: SURGERY

## 2021-09-16 RX ORDER — METHYLPREDNISOLONE 4 MG/1
TABLET ORAL
Qty: 1 EACH | Refills: 0 | Status: SHIPPED | OUTPATIENT
Start: 2021-09-16 | End: 2022-08-05

## 2021-09-16 NOTE — PROGRESS NOTES
Tyson VICENTE  Attending, Orthopaedic Surgery  Hand, Wrist, and Elbow Surgery  [facility]      ORTHOPAEDIC HAND, WRIST, AND ELBOW OFFICE  VISIT       ASSESSMENT/PLAN:      40 y o  female with bilateral hand numbness/tingling, likely carpal tunnel syndrome, right worse then left, bilateral ulnar neuritis vs cubital tunnel syndrome and right lateral epicondylitis  The etiology of above diagnosis was discussed including treatment options  Radha was fit with a left sided cock up wrist brace, to be worn at night along with her right sided brace that she has at home  Bilateral wrist and bilateral elbow ultrasounds were ordered to further evaluate for carpal tunnel and cubital tunnel syndrome vs ulnar neuritis  OT was ordered for ulnar nerve glides as well as right sided lateral epicondylitis exercises  A medrol Dosepak was prescribed and sent to her pharmacy electronically, she does get heart palpitation with oral steroids but notes she is okay with this  Advised Radha to stop the medication if she develops any advert side affects  Follow up in the office once the ultrasounds are complete to further discuss treatment options  The patient verbalized understanding of exam findings and treatment plan  We engaged in the shared decision-making process and treatment options were discussed at length with the patient  Surgical and conservative management discussed today along with risks and benefits  Diagnoses and all orders for this visit:    Carpal tunnel syndrome on left  -     Cock Up Wrist Splint  -     US MSK limited; Future    Carpal tunnel syndrome of right wrist  -     Ambulatory referral to [facility] MSK limited; Future    Ulnar neuritis, unspecified laterality  -     Ambulatory referral to PT/OT hand therapy; Future  -     US MSK limited; Future    Lateral epicondylitis of right elbow  -     Ambulatory referral to PT/OT hand therapy;  Future      Follow Up:  Return after ultrasounds are complete  To Do Next Visit:  Re-evaluation of current issue    General Discussions:  Carpal Tunnel Syndrome: The anatomy and physiology of carpal tunnel syndrome was discussed with the patient today  Increase pressure localized under the transverse carpal ligament can cause pain, numbness, tingling, or dysesthesias within the median nerve distribution as well as feelings of fatigue, clumsiness, or awkwardness  These symptoms typically occur at night and worse in the morning upon waking  Eventually, untreated carpal tunnel syndrome can result in weakness and permanent loss of muscle within the thenar compartment of the hand  Treatment options were discussed with the patient  Conservative treatment includes nocturnal resting splints to keep the nerve in a neutral position, ergonomic changes within the work or home environment, activity modification, and tendon gliding exercises  Vitamin B6 one tablet daily over the counter may helpful to reduce symptoms  Steroid injections within the carpal canal can help a majority of patients, however this is often self-limited in a majority of patients  Surgical intervention to divide the transverse carpal ligament typically results in a long-lasting relief of the patient's complaints, with the recurrence rate of less than 1%  Cubital Tunnel Syndrome: The anatomy and physiology of cubital tunnel syndrome were discussed with the patient today in the office  Typically, increased elbow flexion activities decrease blood flow within the intraneural spaces, resulting in a feeling of numbness, tingling, weakness, or clumsiness within the hand and fingers    Occasionally, anatomic structures such as medial elbow osteophytes, the medial head of the triceps, were subluxing ulnar nerve may result in increased pressure or aggravation at the cubital tunnel  Typical signs and symptoms usually include numbness and tingling within the ring and small finger, weakness with , and weakness with pinch  Conservative treatment and includes nocturnal bracing to keep the elbow in a semi-extended position, activity modification, therapy, and avoiding excessive elbow flexion activities  Vitamin B6 one tablet daily over the counter may helpful to reduce symptoms  A majority of patients typically respond to conservative treatment over a period of approximately 3-6 months  EMG/NCV testing of the ulnar nerve at the elbow is not as reliable as carpal tunnel syndrome  Surgical intervention in the form of in situ release of the ulnar nerve at the elbow or ulnar nerve transposition may be required in up to 20% of patients  Lateral Epicondylitis: The anatomy and physiology of lateral epicondylitis was discussed with the patient today  Typically, degenerative changes in the extensor carpi radialis brevis muscle occur over time  These degenerative changes appear as tears of the tendon on MRI  This creates pain over the lateral epicondyle (outside part of elbow)  This pain typically is made worse with palm down lifting activities as well as anything that involves strength and stability of the wrist   The pain may radiate from the wrist up to the elbow  At times, the shoulder may be weak as well which can predispose or cause continuation of the problem  Conservative treatment usually cures a majority of patients; however, this may take up to 6-9 months  Conservative treatment options typically include activity modification, therapy for strengthening of the shoulder and elbow, tennis elbow straps, and possible corticosteroid injections  Corticosteroid injections are very effective at relieving pain but do not alter the natural history of this process    Rather, steroid injections decrease the pain temporarily to allow for therapy to take place without discomfort  It was discussed that therapy to prevent recurrence is a "life long" process and that if patient relies on the steroid injection alone without performing therapeutic exercises the risk of recurrence is likely  Typical home regimen includes heat and stretching and resisted wrist extension exercises discussed in the office  Surgery is required in fewer than 10% of patients     ____________________________________________________________________________________________________________________________________________      CHIEF COMPLAINT:  Chief Complaint   Patient presents with    Left Wrist - Pain, Numbness    Right Wrist - Pain, Numbness       SUBJECTIVE:  Moraima Greco is a 40y o  year old RHD female who presents to the office today for bilateral hand numbness/tingling, right worse then left  I am seeing Radha in consultation at the request of Dr Hernán Garcia states that she has been experiencing bilateral hand numbness/tingling for aprox  1 year  She notes right hand is worse then left  Numbness/tingling affects her thumbs, index, long and ring fingers  This is intermittent in nature  She notes it is constant every night  Pain can radiate to her elbows at times  Radha is wearing a right sided cock up wrist brace at night, which is partially beneficial for her  She notes a + flick sign  Numbness/tingling is waking her from sleep at night         Pain/symptom timing:  Worse during the day when active  Pain/symptom context:  Worse with activites and work  Pain/symptom modifying factors:  Rest makes better, activities make worse  Pain/symptom associated signs/symptoms: none    Prior treatment   · NSAIDsNo   · Injections No   · Bracing/Orthotics Yes    Physical Therapy No     I have personally reviewed all the relevant PMH, PSH, SH, FH, Medications and allergies      PAST MEDICAL HISTORY:  Past Medical History:   Diagnosis Date    Dizziness     Hypertension     Infertility, female     Multiple thyroid nodules     Obesity     Resolved 2016    Palpitations     Palpitations     Resolved 2016    PONV (postoperative nausea and vomiting)     Thyroid nodule     Last Assessed: 2014     Thyroid nodule 3/31/2019    Vertigo        PAST SURGICAL HISTORY:  Past Surgical History:   Procedure Laterality Date    BREAST BIOPSY Right     Percutaneous Needle Core Right      SECTION      CHOLECYSTECTOMY      Laparoscopic     COLPORRHAPHY N/A 2020    Procedure: POST  COLPORRHAPHY;  Surgeon: Yaneli Marin MD;  Location: [facility];  Service: UroGynecology           ENDOMETRIAL ABLATION      TUBAL LIGATION      WISDOM TOOTH EXTRACTION         FAMILY HISTORY:  Family History   Problem Relation Age of Onset    Hypertension Mother 36    Heart disease Mother         rheumatic heart disease, MVP    Nephrolithiasis Mother     Diabetes Mother     Hypertension Maternal Grandmother 36    Diabetes Maternal Grandmother     Other Maternal Grandfather         CABG     Stroke Maternal Grandfather         Stroke Syndrome     Prostate cancer Maternal Grandfather 61    Heart disease Maternal Grandfather 79        hx CABG    Cancer Maternal Grandfather         Prostate    Coronary aneurysm Maternal Grandfather         MI at a young age    [facility] Diabetes Paternal Grandmother     Cancer Paternal Grandmother 79        Vulvar     Stroke Paternal Grandmother [de-identified]    Hypertension Paternal Grandmother     Hyperthyroidism Family     Hypothyroidism Family     Lymphoma Maternal Uncle 36    Lymphoma Paternal Aunt 48        non hodgkins follicular lymphoma    Hypothyroidism Paternal Aunt     Breast cancer Cousin 52        bilateral , mets to abd/pelvis   Genetic testing negative    Prostate cancer Father 72    No Known Problems Sister     No Known Problems Daughter     No Known Problems Sister     No Known Problems Sister  No Known Problems Brother     No Known Problems Brother     No Known Problems Daughter     No Known Problems Son        SOCIAL HISTORY:  Social History     Tobacco Use    Smoking status: Never Smoker    Smokeless tobacco: Never Used   Vaping Use    Vaping Use: Never used   Substance Use Topics    Alcohol use: Yes     Comment: Rarely    Drug use: No       MEDICATIONS:    Current Outpatient Medications:     Ascorbic Acid (vitamin C) 100 MG tablet, Take 100 mg by mouth daily, Disp: , Rfl:     docusate sodium (COLACE) 100 mg capsule, Take 1 capsule (100 mg total) by mouth 2 (two) times a day, Disp: 10 capsule, Rfl: 0    ergocalciferol (VITAMIN D2) 50,000 units, Take 1 capsule (50,000 Units total) by mouth once a week After 12 weeks get blood work to determine your next dose , Disp: 12 capsule, Rfl: 0    lisinopril (ZESTRIL) 10 mg tablet, Take 1 tablet (10 mg total) by mouth daily, Disp: 90 tablet, Rfl: 2    Multiple Vitamins-Minerals (ZINC PO), Take by mouth, Disp: , Rfl:     SUMAtriptan (IMITREX) 25 mg tablet, Take 1 tablet (25 mg total) by mouth once as needed for migraine for up to 1 dose, Disp: 12 tablet, Rfl: 2    VITAMIN A PO, Take by mouth, Disp: , Rfl:     Vitamin D, Cholecalciferol, 50 MCG (2000 UT) CAPS, Take 5,000 Units by mouth 2 (two) times a week , Disp: , Rfl:     ALLERGIES:  No Known Allergies        REVIEW OF SYSTEMS:  Review of Systems   Constitutional: Negative for chills, fever and unexpected weight change  HENT: Negative for hearing loss, nosebleeds and sore throat  Eyes: Negative for pain, redness and visual disturbance  Respiratory: Negative for cough, shortness of breath and wheezing  Cardiovascular: Negative for chest pain, palpitations and leg swelling  Gastrointestinal: Negative for abdominal pain, nausea and vomiting  Endocrine: Negative for polydipsia and polyuria  Genitourinary: Negative for difficulty urinating and hematuria     Musculoskeletal: Negative for arthralgias, joint swelling and myalgias  Skin: Negative for rash and wound  Neurological: Positive for numbness  Negative for dizziness and headaches  Psychiatric/Behavioral: Negative for decreased concentration, dysphoric mood and suicidal ideas  The patient is not nervous/anxious  VITALS:  Vitals:    09/16/21 0821   BP: 138/82   Pulse: 68       LABS:  HgA1c:   Lab Results   Component Value Date    HGBA1C 5 2 07/31/2021     BMP:   Lab Results   Component Value Date    GLUCOSE 95 05/24/2014    CALCIUM 9 3 07/31/2021     05/24/2014    K 4 2 07/31/2021    CO2 26 07/31/2021     07/31/2021    BUN 20 07/31/2021    CREATININE 0 78 07/31/2021       _____________________________________________________  PHYSICAL EXAMINATION:  General: well developed and well nourished, alert, oriented times 3 and appears comfortable  Psychiatric: Normal  HEENT: Normocephalic, Atraumatic Trachea Midline, No torticollis  Pulmonary: No audible wheezing or respiratory distress   Abdomen/GI: Non tender, non distended   Cardiovascular: No pitting edema, 2+ radial pulse   Skin: No masses, erythema, lacerations, fluctation, ulcerations  Neurovascular: Sensation Intact to the Median, Ulnar, Radial Nerve, Motor Intact to the Median, Ulnar, Radial Nerve and Pulses Intact  Musculoskeletal: Normal, except as noted in detailed exam and in HPI  MUSCULOSKELETAL EXAMINATION:    Bilateral Carpal Tunnel Exam:  Negative thenar atrophy  Positive phalen's test  Positive carpal tunnel compression on the right, - on the left  Positive tinels over median nerve at the wrist   Opposition strength 5/5  Abduction strength 5/5  Bilateral Ulnar Nerve Exam:  Negative intrinsic atrophy  Negative  deformity at the elbow  Full range of motion with flexion and extension of the elbow  Negative ulnar nerve compression test at the elbow  Positive tinels over the ulnar nerve at the elbow on the right       Lateral epicondyle tender to palpation on the right   Mild pain with resisted wrist extension with elbow fully extended on the right    ___________________________________________________  STUDIES REVIEWED:  No imaging to review         PROCEDURES PERFORMED:  Procedures  No Procedures performed today    _____________________________________________________      Nilay Pelayo    I,:  Stevie Posey am acting as a scribe while in the presence of the attending physician :       I,:  Jeaneth Stone MD personally performed the services described in this documentation    as scribed in my presence :

## 2021-09-29 ENCOUNTER — EVALUATION (OUTPATIENT)
Dept: OCCUPATIONAL THERAPY | Facility: CLINIC | Age: 46
End: 2021-09-29
Payer: COMMERCIAL

## 2021-09-29 DIAGNOSIS — M77.11 LATERAL EPICONDYLITIS OF RIGHT ELBOW: ICD-10-CM

## 2021-09-29 DIAGNOSIS — G56.20 ULNAR NEURITIS, UNSPECIFIED LATERALITY: ICD-10-CM

## 2021-09-29 PROCEDURE — 97112 NEUROMUSCULAR REEDUCATION: CPT

## 2021-09-29 PROCEDURE — 97110 THERAPEUTIC EXERCISES: CPT

## 2021-09-29 PROCEDURE — 97165 OT EVAL LOW COMPLEX 30 MIN: CPT

## 2021-10-04 ENCOUNTER — HOSPITAL ENCOUNTER (OUTPATIENT)
Dept: MAMMOGRAPHY | Facility: HOSPITAL | Age: 46
Discharge: HOME | End: 2021-10-04
Attending: OBSTETRICS & GYNECOLOGY
Payer: COMMERCIAL

## 2021-10-04 VITALS — WEIGHT: 164 LBS | HEIGHT: 60 IN | BODY MASS INDEX: 32.2 KG/M2

## 2021-10-04 DIAGNOSIS — Z12.31 ENCOUNTER FOR SCREENING MAMMOGRAM FOR BREAST CANCER: ICD-10-CM

## 2021-10-04 PROCEDURE — 77067 SCR MAMMO BI INCL CAD: CPT

## 2021-10-04 PROCEDURE — 77063 BREAST TOMOSYNTHESIS BI: CPT

## 2021-10-05 ENCOUNTER — HOSPITAL ENCOUNTER (OUTPATIENT)
Dept: RADIOLOGY | Facility: HOSPITAL | Age: 46
Discharge: HOME | End: 2021-10-05
Attending: SURGERY
Payer: COMMERCIAL

## 2021-10-05 DIAGNOSIS — G56.01 CARPAL TUNNEL SYNDROME OF RIGHT WRIST: ICD-10-CM

## 2021-10-05 DIAGNOSIS — G56.02 CARPAL TUNNEL SYNDROME ON LEFT: ICD-10-CM

## 2021-10-05 DIAGNOSIS — G56.20 ULNAR NEURITIS, UNSPECIFIED LATERALITY: ICD-10-CM

## 2021-10-05 PROCEDURE — 76882 US LMTD JT/FCL EVL NVASC XTR: CPT

## 2021-10-06 ENCOUNTER — APPOINTMENT (OUTPATIENT)
Dept: OCCUPATIONAL THERAPY | Facility: CLINIC | Age: 46
End: 2021-10-06
Payer: COMMERCIAL

## 2021-10-06 ENCOUNTER — APPOINTMENT (EMERGENCY)
Dept: RADIOLOGY | Facility: HOSPITAL | Age: 46
End: 2021-10-06
Payer: COMMERCIAL

## 2021-10-06 ENCOUNTER — HOSPITAL ENCOUNTER (EMERGENCY)
Facility: HOSPITAL | Age: 46
Discharge: HOME | End: 2021-10-06
Attending: EMERGENCY MEDICINE | Admitting: EMERGENCY MEDICINE
Payer: COMMERCIAL

## 2021-10-06 VITALS
RESPIRATION RATE: 16 BRPM | SYSTOLIC BLOOD PRESSURE: 134 MMHG | HEART RATE: 64 BPM | TEMPERATURE: 98.6 F | OXYGEN SATURATION: 100 % | DIASTOLIC BLOOD PRESSURE: 68 MMHG

## 2021-10-06 DIAGNOSIS — R07.89 ATYPICAL CHEST PAIN: Primary | ICD-10-CM

## 2021-10-06 LAB
ANION GAP SERPL CALCULATED.3IONS-SCNC: 11 MMOL/L (ref 4–13)
BASOPHILS # BLD AUTO: 0.03 THOUSANDS/ΜL (ref 0–0.1)
BASOPHILS NFR BLD AUTO: 1 % (ref 0–1)
BUN SERPL-MCNC: 25 MG/DL (ref 5–25)
CALCIUM SERPL-MCNC: 8.9 MG/DL (ref 8.3–10.1)
CHLORIDE SERPL-SCNC: 106 MMOL/L (ref 100–108)
CO2 SERPL-SCNC: 23 MMOL/L (ref 21–32)
CREAT SERPL-MCNC: 0.81 MG/DL (ref 0.6–1.3)
D DIMER PPP FEU-MCNC: 0.43 UG/ML FEU
EOSINOPHIL # BLD AUTO: 0.13 THOUSAND/ΜL (ref 0–0.61)
EOSINOPHIL NFR BLD AUTO: 2 % (ref 0–6)
ERYTHROCYTE [DISTWIDTH] IN BLOOD BY AUTOMATED COUNT: 12.8 % (ref 11.6–15.1)
GFR SERPL CREATININE-BSD FRML MDRD: 88 ML/MIN/1.73SQ M
GLUCOSE SERPL-MCNC: 97 MG/DL (ref 65–140)
HCT VFR BLD AUTO: 42.6 % (ref 34.8–46.1)
HGB BLD-MCNC: 13.9 G/DL (ref 11.5–15.4)
IMM GRANULOCYTES # BLD AUTO: 0.01 THOUSAND/UL (ref 0–0.2)
IMM GRANULOCYTES NFR BLD AUTO: 0 % (ref 0–2)
LYMPHOCYTES # BLD AUTO: 1.77 THOUSANDS/ΜL (ref 0.6–4.47)
LYMPHOCYTES NFR BLD AUTO: 29 % (ref 14–44)
MCH RBC QN AUTO: 30 PG (ref 26.8–34.3)
MCHC RBC AUTO-ENTMCNC: 32.6 G/DL (ref 31.4–37.4)
MCV RBC AUTO: 92 FL (ref 82–98)
MONOCYTES # BLD AUTO: 0.33 THOUSAND/ΜL (ref 0.17–1.22)
MONOCYTES NFR BLD AUTO: 5 % (ref 4–12)
NEUTROPHILS # BLD AUTO: 3.81 THOUSANDS/ΜL (ref 1.85–7.62)
NEUTS SEG NFR BLD AUTO: 63 % (ref 43–75)
NRBC BLD AUTO-RTO: 0 /100 WBCS
PLATELET # BLD AUTO: 274 THOUSANDS/UL (ref 149–390)
PMV BLD AUTO: 10 FL (ref 8.9–12.7)
POTASSIUM SERPL-SCNC: 4 MMOL/L (ref 3.5–5.3)
RBC # BLD AUTO: 4.64 MILLION/UL (ref 3.81–5.12)
SODIUM SERPL-SCNC: 140 MMOL/L (ref 136–145)
TROPONIN I SERPL-MCNC: <0.02 NG/ML
TROPONIN I SERPL-MCNC: <0.02 NG/ML
WBC # BLD AUTO: 6.08 THOUSAND/UL (ref 4.31–10.16)

## 2021-10-06 PROCEDURE — 71045 X-RAY EXAM CHEST 1 VIEW: CPT

## 2021-10-06 PROCEDURE — 80048 BASIC METABOLIC PNL TOTAL CA: CPT | Performed by: PHYSICIAN ASSISTANT

## 2021-10-06 PROCEDURE — 99284 EMERGENCY DEPT VISIT MOD MDM: CPT | Performed by: PHYSICIAN ASSISTANT

## 2021-10-06 PROCEDURE — 85025 COMPLETE CBC W/AUTO DIFF WBC: CPT | Performed by: PHYSICIAN ASSISTANT

## 2021-10-06 PROCEDURE — 93005 ELECTROCARDIOGRAM TRACING: CPT

## 2021-10-06 PROCEDURE — 84484 ASSAY OF TROPONIN QUANT: CPT | Performed by: PHYSICIAN ASSISTANT

## 2021-10-06 PROCEDURE — 99285 EMERGENCY DEPT VISIT HI MDM: CPT

## 2021-10-06 PROCEDURE — 36415 COLL VENOUS BLD VENIPUNCTURE: CPT | Performed by: PHYSICIAN ASSISTANT

## 2021-10-06 PROCEDURE — 85379 FIBRIN DEGRADATION QUANT: CPT | Performed by: PHYSICIAN ASSISTANT

## 2021-10-06 PROCEDURE — 96374 THER/PROPH/DIAG INJ IV PUSH: CPT

## 2021-10-06 RX ORDER — SODIUM CHLORIDE 9 MG/ML
3 INJECTION INTRAVENOUS
Status: DISCONTINUED | OUTPATIENT
Start: 2021-10-06 | End: 2021-10-06 | Stop reason: HOSPADM

## 2021-10-06 RX ORDER — KETOROLAC TROMETHAMINE 30 MG/ML
15 INJECTION, SOLUTION INTRAMUSCULAR; INTRAVENOUS ONCE
Status: COMPLETED | OUTPATIENT
Start: 2021-10-06 | End: 2021-10-06

## 2021-10-06 RX ADMIN — KETOROLAC TROMETHAMINE 15 MG: 30 INJECTION, SOLUTION INTRAMUSCULAR at 09:02

## 2021-10-08 LAB
ATRIAL RATE: 75 BPM
P AXIS: 66 DEGREES
PR INTERVAL: 160 MS
QRS AXIS: 51 DEGREES
QRSD INTERVAL: 76 MS
QT INTERVAL: 362 MS
QTC INTERVAL: 404 MS
T WAVE AXIS: 46 DEGREES
VENTRICULAR RATE: 75 BPM

## 2021-10-08 PROCEDURE — 93010 ELECTROCARDIOGRAM REPORT: CPT | Performed by: INTERNAL MEDICINE

## 2021-10-12 ENCOUNTER — OFFICE VISIT (OUTPATIENT)
Dept: OCCUPATIONAL THERAPY | Facility: CLINIC | Age: 46
End: 2021-10-12
Payer: COMMERCIAL

## 2021-10-12 DIAGNOSIS — M77.11 LATERAL EPICONDYLITIS OF RIGHT ELBOW: ICD-10-CM

## 2021-10-12 DIAGNOSIS — G56.20 ULNAR NEURITIS, UNSPECIFIED LATERALITY: Primary | ICD-10-CM

## 2021-10-12 PROCEDURE — 97140 MANUAL THERAPY 1/> REGIONS: CPT

## 2021-10-12 PROCEDURE — 97110 THERAPEUTIC EXERCISES: CPT

## 2021-10-14 ENCOUNTER — APPOINTMENT (OUTPATIENT)
Dept: OCCUPATIONAL THERAPY | Facility: CLINIC | Age: 46
End: 2021-10-14
Payer: COMMERCIAL

## 2021-10-19 ENCOUNTER — OFFICE VISIT (OUTPATIENT)
Dept: OCCUPATIONAL THERAPY | Facility: CLINIC | Age: 46
End: 2021-10-19
Payer: COMMERCIAL

## 2021-10-19 DIAGNOSIS — G56.20 ULNAR NEURITIS, UNSPECIFIED LATERALITY: Primary | ICD-10-CM

## 2021-10-19 DIAGNOSIS — M77.11 LATERAL EPICONDYLITIS OF RIGHT ELBOW: ICD-10-CM

## 2021-10-19 PROCEDURE — 97140 MANUAL THERAPY 1/> REGIONS: CPT

## 2021-10-19 PROCEDURE — 97110 THERAPEUTIC EXERCISES: CPT

## 2021-10-20 ENCOUNTER — OFFICE VISIT (OUTPATIENT)
Dept: OBGYN CLINIC | Facility: CLINIC | Age: 46
End: 2021-10-20
Payer: COMMERCIAL

## 2021-10-20 VITALS
BODY MASS INDEX: 31.53 KG/M2 | SYSTOLIC BLOOD PRESSURE: 134 MMHG | HEART RATE: 72 BPM | WEIGHT: 167 LBS | HEIGHT: 61 IN | RESPIRATION RATE: 17 BRPM | DIASTOLIC BLOOD PRESSURE: 83 MMHG

## 2021-10-20 DIAGNOSIS — G56.03 CARPAL TUNNEL SYNDROME, BILATERAL: Primary | ICD-10-CM

## 2021-10-20 DIAGNOSIS — G56.23 CUBITAL TUNNEL SYNDROME, BILATERAL: ICD-10-CM

## 2021-10-20 PROCEDURE — 99213 OFFICE O/P EST LOW 20 MIN: CPT | Performed by: SURGERY

## 2021-10-21 ENCOUNTER — OFFICE VISIT (OUTPATIENT)
Dept: OCCUPATIONAL THERAPY | Facility: CLINIC | Age: 46
End: 2021-10-21
Payer: COMMERCIAL

## 2021-10-21 DIAGNOSIS — M77.11 LATERAL EPICONDYLITIS OF RIGHT ELBOW: ICD-10-CM

## 2021-10-21 DIAGNOSIS — G56.20 ULNAR NEURITIS, UNSPECIFIED LATERALITY: Primary | ICD-10-CM

## 2021-10-21 PROCEDURE — 97140 MANUAL THERAPY 1/> REGIONS: CPT

## 2021-10-21 PROCEDURE — 97110 THERAPEUTIC EXERCISES: CPT

## 2021-10-26 ENCOUNTER — OFFICE VISIT (OUTPATIENT)
Dept: OCCUPATIONAL THERAPY | Facility: CLINIC | Age: 46
End: 2021-10-26
Payer: COMMERCIAL

## 2021-10-26 DIAGNOSIS — G56.20 ULNAR NEURITIS, UNSPECIFIED LATERALITY: Primary | ICD-10-CM

## 2021-10-26 DIAGNOSIS — M77.11 LATERAL EPICONDYLITIS OF RIGHT ELBOW: ICD-10-CM

## 2021-10-26 PROCEDURE — 97110 THERAPEUTIC EXERCISES: CPT

## 2021-10-26 PROCEDURE — 97140 MANUAL THERAPY 1/> REGIONS: CPT

## 2021-10-28 ENCOUNTER — OFFICE VISIT (OUTPATIENT)
Dept: OCCUPATIONAL THERAPY | Facility: CLINIC | Age: 46
End: 2021-10-28
Payer: COMMERCIAL

## 2021-10-28 DIAGNOSIS — M77.11 LATERAL EPICONDYLITIS OF RIGHT ELBOW: ICD-10-CM

## 2021-10-28 DIAGNOSIS — G56.20 ULNAR NEURITIS, UNSPECIFIED LATERALITY: Primary | ICD-10-CM

## 2021-10-28 PROCEDURE — 97110 THERAPEUTIC EXERCISES: CPT

## 2021-10-28 PROCEDURE — 97140 MANUAL THERAPY 1/> REGIONS: CPT

## 2021-11-03 ENCOUNTER — APPOINTMENT (OUTPATIENT)
Dept: OCCUPATIONAL THERAPY | Facility: CLINIC | Age: 46
End: 2021-11-03
Payer: COMMERCIAL

## 2021-11-11 ENCOUNTER — APPOINTMENT (OUTPATIENT)
Dept: LAB | Facility: CLINIC | Age: 46
End: 2021-11-11
Payer: COMMERCIAL

## 2021-11-11 ENCOUNTER — OFFICE VISIT (OUTPATIENT)
Dept: OCCUPATIONAL THERAPY | Facility: CLINIC | Age: 46
End: 2021-11-11
Payer: COMMERCIAL

## 2021-11-11 DIAGNOSIS — E65 LOCALIZED ADIPOSITY: ICD-10-CM

## 2021-11-11 DIAGNOSIS — M77.11 LATERAL EPICONDYLITIS OF RIGHT ELBOW: Primary | ICD-10-CM

## 2021-11-11 DIAGNOSIS — Z01.812 PRE-OPERATIVE LABORATORY EXAMINATION: ICD-10-CM

## 2021-11-11 DIAGNOSIS — E55.9 VITAMIN D DEFICIENCY: ICD-10-CM

## 2021-11-11 DIAGNOSIS — Z01.818 OTHER SPECIFIED PRE-OPERATIVE EXAMINATION: ICD-10-CM

## 2021-11-11 LAB
25(OH)D3 SERPL-MCNC: 38 NG/ML (ref 30–100)
ANION GAP SERPL CALCULATED.3IONS-SCNC: 6 MMOL/L (ref 4–13)
BUN SERPL-MCNC: 23 MG/DL (ref 5–25)
CALCIUM SERPL-MCNC: 9.1 MG/DL (ref 8.3–10.1)
CHLORIDE SERPL-SCNC: 104 MMOL/L (ref 100–108)
CO2 SERPL-SCNC: 28 MMOL/L (ref 21–32)
CREAT SERPL-MCNC: 0.71 MG/DL (ref 0.6–1.3)
ERYTHROCYTE [DISTWIDTH] IN BLOOD BY AUTOMATED COUNT: 12.1 % (ref 11.6–15.1)
GFR SERPL CREATININE-BSD FRML MDRD: 103 ML/MIN/1.73SQ M
GLUCOSE SERPL-MCNC: 93 MG/DL (ref 65–140)
HCT VFR BLD AUTO: 39.9 % (ref 34.8–46.1)
HGB BLD-MCNC: 13.3 G/DL (ref 11.5–15.4)
MCH RBC QN AUTO: 30 PG (ref 26.8–34.3)
MCHC RBC AUTO-ENTMCNC: 33.3 G/DL (ref 31.4–37.4)
MCV RBC AUTO: 90 FL (ref 82–98)
PLATELET # BLD AUTO: 317 THOUSANDS/UL (ref 149–390)
PMV BLD AUTO: 9.4 FL (ref 8.9–12.7)
POTASSIUM SERPL-SCNC: 3.6 MMOL/L (ref 3.5–5.3)
RBC # BLD AUTO: 4.44 MILLION/UL (ref 3.81–5.12)
SODIUM SERPL-SCNC: 138 MMOL/L (ref 136–145)
WBC # BLD AUTO: 6.93 THOUSAND/UL (ref 4.31–10.16)

## 2021-11-11 PROCEDURE — 82306 VITAMIN D 25 HYDROXY: CPT

## 2021-11-11 PROCEDURE — 97112 NEUROMUSCULAR REEDUCATION: CPT

## 2021-11-11 PROCEDURE — 36415 COLL VENOUS BLD VENIPUNCTURE: CPT

## 2021-11-11 PROCEDURE — 85027 COMPLETE CBC AUTOMATED: CPT

## 2021-11-11 PROCEDURE — 80048 BASIC METABOLIC PNL TOTAL CA: CPT

## 2021-11-11 PROCEDURE — 97110 THERAPEUTIC EXERCISES: CPT

## 2021-12-07 ENCOUNTER — ANESTHESIA EVENT (OUTPATIENT)
Dept: PERIOP | Facility: HOSPITAL | Age: 46
End: 2021-12-07
Payer: SELF-PAY

## 2021-12-10 ENCOUNTER — ANESTHESIA (OUTPATIENT)
Dept: PERIOP | Facility: HOSPITAL | Age: 46
End: 2021-12-10
Payer: SELF-PAY

## 2021-12-10 ENCOUNTER — HOSPITAL ENCOUNTER (OUTPATIENT)
Facility: HOSPITAL | Age: 46
Setting detail: OUTPATIENT SURGERY
Discharge: HOME | End: 2021-12-10
Attending: SURGERY | Admitting: SURGERY
Payer: SELF-PAY

## 2021-12-10 VITALS
BODY MASS INDEX: 30.96 KG/M2 | HEART RATE: 54 BPM | DIASTOLIC BLOOD PRESSURE: 56 MMHG | RESPIRATION RATE: 14 BRPM | WEIGHT: 164 LBS | SYSTOLIC BLOOD PRESSURE: 97 MMHG | HEIGHT: 61 IN | OXYGEN SATURATION: 97 % | TEMPERATURE: 95.7 F

## 2021-12-10 LAB
EXT PREGNANCY TEST URINE: NEGATIVE
EXT. CONTROL: NORMAL

## 2021-12-10 PROCEDURE — 81025 URINE PREGNANCY TEST: CPT | Performed by: SURGERY

## 2021-12-10 RX ORDER — MEPERIDINE HYDROCHLORIDE 25 MG/ML
12.5 INJECTION INTRAMUSCULAR; INTRAVENOUS; SUBCUTANEOUS
Status: DISCONTINUED | OUTPATIENT
Start: 2021-12-10 | End: 2021-12-10 | Stop reason: HOSPADM

## 2021-12-10 RX ORDER — ONDANSETRON 2 MG/ML
INJECTION INTRAMUSCULAR; INTRAVENOUS AS NEEDED
Status: DISCONTINUED | OUTPATIENT
Start: 2021-12-10 | End: 2021-12-10

## 2021-12-10 RX ORDER — MIDAZOLAM HYDROCHLORIDE 2 MG/2ML
INJECTION, SOLUTION INTRAMUSCULAR; INTRAVENOUS AS NEEDED
Status: DISCONTINUED | OUTPATIENT
Start: 2021-12-10 | End: 2021-12-10

## 2021-12-10 RX ORDER — GLYCOPYRROLATE 0.2 MG/ML
INJECTION INTRAMUSCULAR; INTRAVENOUS AS NEEDED
Status: DISCONTINUED | OUTPATIENT
Start: 2021-12-10 | End: 2021-12-10

## 2021-12-10 RX ORDER — SCOLOPAMINE TRANSDERMAL SYSTEM 1 MG/1
1 PATCH, EXTENDED RELEASE TRANSDERMAL ONCE
Status: DISCONTINUED | OUTPATIENT
Start: 2021-12-10 | End: 2021-12-10 | Stop reason: HOSPADM

## 2021-12-10 RX ORDER — DEXAMETHASONE SODIUM PHOSPHATE 4 MG/ML
4 INJECTION, SOLUTION INTRA-ARTICULAR; INTRALESIONAL; INTRAMUSCULAR; INTRAVENOUS; SOFT TISSUE ONCE AS NEEDED
Status: DISCONTINUED | OUTPATIENT
Start: 2021-12-10 | End: 2021-12-10 | Stop reason: HOSPADM

## 2021-12-10 RX ORDER — PROPOFOL 10 MG/ML
INJECTION, EMULSION INTRAVENOUS AS NEEDED
Status: DISCONTINUED | OUTPATIENT
Start: 2021-12-10 | End: 2021-12-10

## 2021-12-10 RX ORDER — MINERAL OIL
OIL (ML) MISCELLANEOUS AS NEEDED
Status: DISCONTINUED | OUTPATIENT
Start: 2021-12-10 | End: 2021-12-10 | Stop reason: HOSPADM

## 2021-12-10 RX ORDER — FENTANYL CITRATE/PF 50 MCG/ML
12.5 SYRINGE (ML) INJECTION
Status: DISCONTINUED | OUTPATIENT
Start: 2021-12-10 | End: 2021-12-10 | Stop reason: HOSPADM

## 2021-12-10 RX ORDER — EPHEDRINE SULFATE 50 MG/ML
INJECTION INTRAVENOUS AS NEEDED
Status: DISCONTINUED | OUTPATIENT
Start: 2021-12-10 | End: 2021-12-10

## 2021-12-10 RX ORDER — HYDROMORPHONE HCL/PF 1 MG/ML
SYRINGE (ML) INJECTION AS NEEDED
Status: DISCONTINUED | OUTPATIENT
Start: 2021-12-10 | End: 2021-12-10

## 2021-12-10 RX ORDER — DEXAMETHASONE SODIUM PHOSPHATE 10 MG/ML
INJECTION, SOLUTION INTRAMUSCULAR; INTRAVENOUS AS NEEDED
Status: DISCONTINUED | OUTPATIENT
Start: 2021-12-10 | End: 2021-12-10

## 2021-12-10 RX ORDER — CEFAZOLIN SODIUM 2 G/50ML
2000 SOLUTION INTRAVENOUS ONCE
Status: COMPLETED | OUTPATIENT
Start: 2021-12-10 | End: 2021-12-10

## 2021-12-10 RX ORDER — FENTANYL CITRATE 50 UG/ML
INJECTION, SOLUTION INTRAMUSCULAR; INTRAVENOUS AS NEEDED
Status: DISCONTINUED | OUTPATIENT
Start: 2021-12-10 | End: 2021-12-10

## 2021-12-10 RX ORDER — LIDOCAINE HYDROCHLORIDE 10 MG/ML
INJECTION, SOLUTION EPIDURAL; INFILTRATION; INTRACAUDAL; PERINEURAL AS NEEDED
Status: DISCONTINUED | OUTPATIENT
Start: 2021-12-10 | End: 2021-12-10

## 2021-12-10 RX ORDER — HYDROCODONE BITARTRATE AND ACETAMINOPHEN 5; 325 MG/1; MG/1
2 TABLET ORAL EVERY 4 HOURS PRN
Status: DISCONTINUED | OUTPATIENT
Start: 2021-12-10 | End: 2021-12-10 | Stop reason: HOSPADM

## 2021-12-10 RX ORDER — DIPHENHYDRAMINE HYDROCHLORIDE 50 MG/ML
12.5 INJECTION INTRAMUSCULAR; INTRAVENOUS ONCE AS NEEDED
Status: DISCONTINUED | OUTPATIENT
Start: 2021-12-10 | End: 2021-12-10 | Stop reason: HOSPADM

## 2021-12-10 RX ORDER — FENTANYL CITRATE/PF 50 MCG/ML
25 SYRINGE (ML) INJECTION
Status: DISCONTINUED | OUTPATIENT
Start: 2021-12-10 | End: 2021-12-10 | Stop reason: HOSPADM

## 2021-12-10 RX ORDER — NEOSTIGMINE METHYLSULFATE 1 MG/ML
INJECTION INTRAVENOUS AS NEEDED
Status: DISCONTINUED | OUTPATIENT
Start: 2021-12-10 | End: 2021-12-10

## 2021-12-10 RX ORDER — ROCURONIUM BROMIDE 10 MG/ML
INJECTION, SOLUTION INTRAVENOUS AS NEEDED
Status: DISCONTINUED | OUTPATIENT
Start: 2021-12-10 | End: 2021-12-10

## 2021-12-10 RX ORDER — PROMETHAZINE HYDROCHLORIDE 25 MG/ML
12.5 INJECTION, SOLUTION INTRAMUSCULAR; INTRAVENOUS ONCE AS NEEDED
Status: DISCONTINUED | OUTPATIENT
Start: 2021-12-10 | End: 2021-12-10 | Stop reason: HOSPADM

## 2021-12-10 RX ORDER — SODIUM CHLORIDE, SODIUM LACTATE, POTASSIUM CHLORIDE, CALCIUM CHLORIDE 600; 310; 30; 20 MG/100ML; MG/100ML; MG/100ML; MG/100ML
75 INJECTION, SOLUTION INTRAVENOUS CONTINUOUS
Status: DISCONTINUED | OUTPATIENT
Start: 2021-12-10 | End: 2021-12-10 | Stop reason: HOSPADM

## 2021-12-10 RX ADMIN — HYDROCODONE BITARTRATE AND ACETAMINOPHEN 1 TABLET: 5; 325 TABLET ORAL at 13:02

## 2021-12-10 RX ADMIN — FENTANYL CITRATE 100 MCG: 50 INJECTION, SOLUTION INTRAMUSCULAR; INTRAVENOUS at 07:27

## 2021-12-10 RX ADMIN — MIDAZOLAM 2 MG: 1 INJECTION INTRAMUSCULAR; INTRAVENOUS at 07:27

## 2021-12-10 RX ADMIN — ROCURONIUM BROMIDE 20 MG: 10 INJECTION, SOLUTION INTRAVENOUS at 08:45

## 2021-12-10 RX ADMIN — DEXAMETHASONE SODIUM PHOSPHATE 8 MG: 10 INJECTION, SOLUTION INTRAMUSCULAR; INTRAVENOUS at 07:44

## 2021-12-10 RX ADMIN — CEFAZOLIN SODIUM 2000 MG: 2 SOLUTION INTRAVENOUS at 07:26

## 2021-12-10 RX ADMIN — EPHEDRINE SULFATE 10 MG: 50 INJECTION, SOLUTION INTRAVENOUS at 10:10

## 2021-12-10 RX ADMIN — ONDANSETRON 4 MG: 2 INJECTION INTRAMUSCULAR; INTRAVENOUS at 10:23

## 2021-12-10 RX ADMIN — ROCURONIUM BROMIDE 50 MG: 10 INJECTION, SOLUTION INTRAVENOUS at 07:29

## 2021-12-10 RX ADMIN — SODIUM CHLORIDE, SODIUM LACTATE, POTASSIUM CHLORIDE, AND CALCIUM CHLORIDE 75 ML/HR: .6; .31; .03; .02 INJECTION, SOLUTION INTRAVENOUS at 06:59

## 2021-12-10 RX ADMIN — EPHEDRINE SULFATE 10 MG: 50 INJECTION, SOLUTION INTRAVENOUS at 09:33

## 2021-12-10 RX ADMIN — LIDOCAINE HYDROCHLORIDE 50 MG: 10 INJECTION, SOLUTION EPIDURAL; INFILTRATION; INTRACAUDAL; PERINEURAL at 07:30

## 2021-12-10 RX ADMIN — GLYCOPYRROLATE 0.4 MG: 0.2 INJECTION, SOLUTION INTRAMUSCULAR; INTRAVENOUS at 10:23

## 2021-12-10 RX ADMIN — SCOPALAMINE 1 PATCH: 1 PATCH, EXTENDED RELEASE TRANSDERMAL at 06:59

## 2021-12-10 RX ADMIN — NEOSTIGMINE METHYLSULFATE 4 MG: 1 INJECTION INTRAVENOUS at 10:22

## 2021-12-10 RX ADMIN — HYDROMORPHONE HYDROCHLORIDE 1 MG: 1 INJECTION, SOLUTION INTRAMUSCULAR; INTRAVENOUS; SUBCUTANEOUS at 09:10

## 2021-12-10 RX ADMIN — SODIUM CHLORIDE, SODIUM LACTATE, POTASSIUM CHLORIDE, AND CALCIUM CHLORIDE: .6; .31; .03; .02 INJECTION, SOLUTION INTRAVENOUS at 09:27

## 2021-12-10 RX ADMIN — PROPOFOL 200 MG: 10 INJECTION, EMULSION INTRAVENOUS at 07:29

## 2021-12-10 RX ADMIN — HYDROMORPHONE HYDROCHLORIDE 1 MG: 1 INJECTION, SOLUTION INTRAMUSCULAR; INTRAVENOUS; SUBCUTANEOUS at 09:25

## 2022-01-06 ENCOUNTER — TELEPHONE (OUTPATIENT)
Dept: FAMILY MEDICINE CLINIC | Facility: CLINIC | Age: 47
End: 2022-01-06

## 2022-01-06 DIAGNOSIS — B34.9 VIRAL ILLNESS: Primary | ICD-10-CM

## 2022-01-06 NOTE — TELEPHONE ENCOUNTER
Pt exposed to covid at home with hsuband and family postive  Started today with chills, sore throat and cough  covid test ordered

## 2022-01-07 PROCEDURE — U0003 INFECTIOUS AGENT DETECTION BY NUCLEIC ACID (DNA OR RNA); SEVERE ACUTE RESPIRATORY SYNDROME CORONAVIRUS 2 (SARS-COV-2) (CORONAVIRUS DISEASE [COVID-19]), AMPLIFIED PROBE TECHNIQUE, MAKING USE OF HIGH THROUGHPUT TECHNOLOGIES AS DESCRIBED BY CMS-2020-01-R: HCPCS | Performed by: FAMILY MEDICINE

## 2022-01-07 PROCEDURE — U0005 INFEC AGEN DETEC AMPLI PROBE: HCPCS | Performed by: FAMILY MEDICINE

## 2022-06-06 ENCOUNTER — PATIENT MESSAGE (OUTPATIENT)
Dept: FAMILY MEDICINE CLINIC | Facility: CLINIC | Age: 47
End: 2022-06-06

## 2022-06-06 DIAGNOSIS — I10 BENIGN ESSENTIAL HYPERTENSION: ICD-10-CM

## 2022-06-06 RX ORDER — LISINOPRIL 10 MG/1
TABLET ORAL
Qty: 90 TABLET | Refills: 0 | OUTPATIENT
Start: 2022-06-06

## 2022-06-06 RX ORDER — LISINOPRIL 10 MG/1
10 TABLET ORAL DAILY
Qty: 90 TABLET | Refills: 1 | Status: SHIPPED | OUTPATIENT
Start: 2022-06-06 | End: 2022-08-05 | Stop reason: SDUPTHER

## 2022-06-06 NOTE — TELEPHONE ENCOUNTER
From: Ruchi Clifford RN  To: Dionne Saravia  Sent: 6/6/2022 3:24 PM EDT  Subject: lisinopril    Ivan Garcia,     We received a refill request from your   [address] ([place]) [facility]  [address] [facility] [address]  Phone: [phone] Fax: [phone] [address] [place],[address] [place] [address] [facility] [address] [facility] [address] [facility]  Phone: [phone] Fax: [address] [facility] [address]  [address] [facility] [address]  Phone: [phone] Fax: [phone]   pharmacy regarding your lisinopril   Currently, we do not accept refill requests from the pharmacy as we've had issues in the past with the request being incorrect on the medication and dosing  Please let our office know if you are in need of a refill at this time  Your partners in health,    Jalaine Pili Drs Merwyn Frankel, Neeta Quinones, Cory Ortiz, and Jose Manuel Vazquez

## 2022-06-06 NOTE — TELEPHONE ENCOUNTER
Medication refill requested: lisinopril  Last office visit: 8/2/21  Next office visit: 8/5/22  Last refilled: 8/2/22 #90x2  Ordering Provider: [facility] ([facility] pharmacy send RX to):   [address] [address]  [address] [address]  Phone: [phone] Fax: [phone]

## 2022-07-05 DIAGNOSIS — N39.0 URINARY TRACT INFECTION WITHOUT HEMATURIA, SITE UNSPECIFIED: Primary | ICD-10-CM

## 2022-07-05 RX ORDER — NITROFURANTOIN 25; 75 MG/1; MG/1
CAPSULE ORAL
Qty: 14 CAPSULE | Refills: 2 | Status: SHIPPED | OUTPATIENT
Start: 2022-07-05 | End: 2022-08-05

## 2022-08-05 ENCOUNTER — OFFICE VISIT (OUTPATIENT)
Dept: FAMILY MEDICINE CLINIC | Facility: CLINIC | Age: 47
End: 2022-08-05
Payer: COMMERCIAL

## 2022-08-05 VITALS
DIASTOLIC BLOOD PRESSURE: 78 MMHG | SYSTOLIC BLOOD PRESSURE: 126 MMHG | OXYGEN SATURATION: 100 % | RESPIRATION RATE: 16 BRPM | HEART RATE: 77 BPM | TEMPERATURE: 97.1 F | BODY MASS INDEX: 30.96 KG/M2 | WEIGHT: 164 LBS | HEIGHT: 61 IN

## 2022-08-05 DIAGNOSIS — E04.1 THYROID NODULE: ICD-10-CM

## 2022-08-05 DIAGNOSIS — E66.09 CLASS 1 OBESITY DUE TO EXCESS CALORIES WITH SERIOUS COMORBIDITY AND BODY MASS INDEX (BMI) OF 31.0 TO 31.9 IN ADULT: ICD-10-CM

## 2022-08-05 DIAGNOSIS — I10 BENIGN ESSENTIAL HYPERTENSION: ICD-10-CM

## 2022-08-05 DIAGNOSIS — Z12.11 SCREENING FOR COLON CANCER: ICD-10-CM

## 2022-08-05 DIAGNOSIS — Z00.00 WELL ADULT EXAM: Primary | ICD-10-CM

## 2022-08-05 DIAGNOSIS — E55.9 VITAMIN D DEFICIENCY: ICD-10-CM

## 2022-08-05 PROBLEM — R63.5 ABNORMAL WEIGHT GAIN: Status: RESOLVED | Noted: 2019-07-11 | Resolved: 2022-08-05

## 2022-08-05 PROBLEM — Z98.890 PONV (POSTOPERATIVE NAUSEA AND VOMITING): Status: RESOLVED | Noted: 2020-12-22 | Resolved: 2022-08-05

## 2022-08-05 PROBLEM — E87.6 HYPOPOTASSEMIA: Status: RESOLVED | Noted: 2019-04-01 | Resolved: 2022-08-05

## 2022-08-05 PROBLEM — R11.2 PONV (POSTOPERATIVE NAUSEA AND VOMITING): Status: RESOLVED | Noted: 2020-12-22 | Resolved: 2022-08-05

## 2022-08-05 PROCEDURE — 99396 PREV VISIT EST AGE 40-64: CPT | Performed by: FAMILY MEDICINE

## 2022-08-05 PROCEDURE — 99214 OFFICE O/P EST MOD 30 MIN: CPT | Performed by: FAMILY MEDICINE

## 2022-08-05 RX ORDER — FOLIC ACID 1 MG/1
400 TABLET ORAL DAILY
COMMUNITY

## 2022-08-05 RX ORDER — CHLORAL HYDRATE 500 MG
1000 CAPSULE ORAL DAILY
COMMUNITY

## 2022-08-05 RX ORDER — LISINOPRIL 10 MG/1
10 TABLET ORAL DAILY
Qty: 90 TABLET | Refills: 2 | Status: SHIPPED | OUTPATIENT
Start: 2022-08-05

## 2022-08-05 NOTE — PATIENT INSTRUCTIONS
Sudeep or Devyn  PHYSICIAN'S [facility])) Initial dosage and titration, 0 25 mg subQ once weekly for weeks 1 through 4; then 0 5 mg once weekly for weeks 5 through 8; then 1 mg once weekly for weeks 9 through 12; then 1 7 mg once weekly for weeks 13 through 16; maintenance, 2 4 mg once weekly thereafter  If titration is not tolerated due to GI adverse events, consider delaying an escalation for 4 weeks 2  (Wegovy(NATHAN)) Maintenance, 2 4 mg subQ once weekly; may temporarily reduce to 1 7 mg once weekly to improve tolerance, for no longer than 4 weeks, and then return to 2 4 mg once weekly  Discontinue use if patient cannot tolerate 2 4 mg once weekly 2

## 2022-08-05 NOTE — PROGRESS NOTES
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  Orders:  -     Semaglutide-Weight Management (WEGOVY) 0 25 MG/0 5ML; Inject 3 4 mL (1 7 mg total) under the skin once a week    Benign essential hypertension  Comments:  well controlled - continue lisinopril   Orders:  -     Comprehensive metabolic panel; Future  -     CBC and differential; Future  -     TSH, 3rd generation with Free T4 reflex; Future  -     Lipid panel; Future  -     Hemoglobin A1C; Future  -     lisinopril (ZESTRIL) 10 mg tablet; Take 1 tablet (10 mg total) by mouth daily    Thyroid nodule  Comments:  last US 2019 nodules stable, did not require f/u   Orders:  -     Comprehensive metabolic panel; Future  -     CBC and differential; Future  -     TSH, 3rd generation with Free T4 reflex; Future  -     Lipid panel; Future  -     Hemoglobin A1C; Future    Screening for colon cancer  -     Ambulatory referral for colonoscopy; Future    Vitamin D deficiency   update labs        Well adult exam  ·         Continue healthy diet   ·         Encourage exercise 4 times a week or more for minimum 30 minutes  ·         Continue to see dentist, wear seatbelt  ·         Health maintenance reviewed - refer for colonoscopy  Update fasting blood work  Scheduled for mammo   Reviewed age appropriate health maintenance screenings and immunizations that are due, risks and benefits of these     Health Maintenance   Topic Date Due    Colorectal Cancer Screening  Never done    COVID-19 Vaccine (2 - Booster for [employer] series) 10/04/2021    OT PLAN OF CARE  10/29/2021    BMI: Followup Plan  08/02/2022    Influenza Vaccine (1) 09/01/2022    Breast Cancer Screening: Mammogram  10/04/2022    Depression Screening  08/05/2023    BMI: Adult  08/05/2023    Annual Physical  08/05/2023    Cervical Cancer Screening  05/28/2026    DTaP,Tdap,and Td Vaccines (3 - Td or Tdap) 02/17/2027    HIV Screening  Completed    Hepatitis C Screening  Completed    Pneumococcal Vaccine: Pediatrics (0 to 5 Years) and At-Risk Patients (6 to 59 Years)  Aged Out    HIB Vaccine  Aged Out    Hepatitis B Vaccine  Aged Out    IPV Vaccine  Aged Out    Hepatitis A Vaccine  Aged Out    Meningococcal ACWY Vaccine  Aged Out    HPV Vaccine  Aged Out     Return in about 1 year (around 8/5/2023) for Annual physical     Subjective:    KORIN Garcia is a 56 y o  female who presents today for a physical      Chief Complaint   Patient presents with    Physical Exam     No concerns    Labs Only     None    Medication Refill    HM     Covid booster- 2nd booster didn't get         Patient Care Team:  Naomi Villatoro, DO as PCP - General (Family Medicine)    PHQ-2/9 Depression Screening    Little interest or pleasure in doing things: 0 - not at all  Feeling down, depressed, or hopeless: 0 - not at all  PHQ-2 Score: 0  PHQ-2 Interpretation: Negative depression screen        ?    ---Above per clinical staff & reviewed   ---  Patient here today for a physical:    Diet: healthy but not able to lose weight   Exercise:  Yes 3 times a week   Dental visits:  Yes   Seatbelt: yes     Concerns today:  Eating well   Exercising 3 times a week 30 - 45 minutes weights and cardio  Dropped weight but struggling   Stella menopausal  Difficult now   6 - 7 hours   Seeing dentist    For weight loss weight management, exercising   Phentermine - side effects - jittery   In past weight watchers, atkins   Supplements   Magnesium, folic acid, collegen, R07, ashewanga, acetol, fish oil, vitamin D           The following portions of the patient's history were reviewed and updated as appropriate: allergies, current medications, past family history, past medical history, past social history, past surgical history and problem list      Current Medications:  Current Outpatient Medications   Medication Sig Dispense Refill    Ascorbic Acid (vitamin C) 100 MG tablet Take 100 mg by mouth daily      cyanocobalamin (VITAMIN B-12) 250 MCG tablet Take 250 mcg by mouth daily      folic acid (FOLVITE) 1 mg tablet Take 400 mcg by mouth daily      lisinopril (ZESTRIL) 10 mg tablet Take 1 tablet (10 mg total) by mouth daily 90 tablet 2    Multiple Vitamins-Minerals (ZINC PO) Take by mouth      Omega-3 Fatty Acids (fish oil) 1,000 mg Take 1,000 mg by mouth daily      Semaglutide-Weight Management (WEGOVY) 0 25 MG/0 5ML Inject 3 4 mL (1 7 mg total) under the skin once a week 13 6 mL 0    VITAMIN A PO Take by mouth      Vitamin D, Cholecalciferol, 50 MCG (2000 UT) CAPS Take 5,000 Units by mouth 2 (two) times a week        No current facility-administered medications for this visit  Objective:      /78   Pulse 77   Temp (!) 97 1 °F (36 2 °C)   Resp 16   Ht 5' 0 5" (1 537 m)   Wt 74 4 kg (164 lb)   SpO2 100%   BMI 31 50 kg/m²   BP Readings from Last 3 Encounters:   08/05/22 126/78   12/10/21 97/56   10/20/21 134/83     Wt Readings from Last 3 Encounters:   08/05/22 74 4 kg (164 lb)   12/10/21 74 4 kg (164 lb)   10/20/21 75 8 kg (167 lb)       Review of Systems  ROS:  all others negative - no chest pain, SOB, normal urine and bowels  no GERD  sleeping well  mood good  Physical Exam   Constitutional: she appears well-developed and well-nourished  HENT: Head: Normocephalic  Right Ear: External ear normal  Tympanic membrane normal    Left Ear: External ear normal  Tympanic membrane normal    Nose: Nose normal  No mucosal edema, No rhinorrhea  Right sinus exhibits no maxillary sinus tenderness     Left sinus exhibits no maxillary sinus tenderness  Mouth/Throat: Oropharynx is clear and moist    Eyes: Normal conjunctiva  No erythema  No discharge  Neck: No pain on exam  Neck supple  Cardiovascular: Normal rate, regular rhythm and normal heart sounds  Pulmonary/Chest: Effort normal and breath sounds normal  No wheezes  No rales  No rhonchi  Abdominal: Soft  Bowel sounds are normal  There is no tenderness  Musculoskeletal: she exhibits no edema  Lymphadenopathy: she has no cervical adenopathy  Neurological: she  is alert and oriented to person, place, and time  Skin: Skin is warm and dry  No rashes  Psychiatric: she  has a normal mood and affect  her behavior is normal  Thought content normal    Vitals reviewed

## 2022-08-10 ENCOUNTER — PATIENT MESSAGE (OUTPATIENT)
Dept: FAMILY MEDICINE CLINIC | Facility: CLINIC | Age: 47
End: 2022-08-10

## 2022-08-10 DIAGNOSIS — Z11.1 SCREENING-PULMONARY TB: Primary | ICD-10-CM

## 2022-08-12 ENCOUNTER — TELEPHONE (OUTPATIENT)
Dept: GASTROENTEROLOGY | Facility: AMBULARY SURGERY CENTER | Age: 47
End: 2022-08-12

## 2022-08-12 ENCOUNTER — APPOINTMENT (OUTPATIENT)
Dept: LAB | Facility: CLINIC | Age: 47
End: 2022-08-12
Payer: COMMERCIAL

## 2022-08-12 DIAGNOSIS — E04.1 THYROID NODULE: ICD-10-CM

## 2022-08-12 DIAGNOSIS — R79.89 LOW VITAMIN D LEVEL: ICD-10-CM

## 2022-08-12 DIAGNOSIS — Z11.1 SCREENING-PULMONARY TB: ICD-10-CM

## 2022-08-12 DIAGNOSIS — I10 BENIGN ESSENTIAL HYPERTENSION: ICD-10-CM

## 2022-08-12 DIAGNOSIS — E55.9 VITAMIN D DEFICIENCY: ICD-10-CM

## 2022-08-12 LAB
25(OH)D3 SERPL-MCNC: 28 NG/ML (ref 30–100)
ALBUMIN SERPL BCP-MCNC: 4.2 G/DL (ref 3.5–5)
ALP SERPL-CCNC: 50 U/L (ref 34–104)
ALT SERPL W P-5'-P-CCNC: 11 U/L (ref 7–52)
ANION GAP SERPL CALCULATED.3IONS-SCNC: 4 MMOL/L (ref 4–13)
AST SERPL W P-5'-P-CCNC: 13 U/L (ref 13–39)
BASOPHILS # BLD AUTO: 0.04 THOUSANDS/ΜL (ref 0–0.1)
BASOPHILS NFR BLD AUTO: 1 % (ref 0–1)
BILIRUB SERPL-MCNC: 0.5 MG/DL (ref 0.2–1)
BUN SERPL-MCNC: 21 MG/DL (ref 5–25)
CALCIUM SERPL-MCNC: 9.1 MG/DL (ref 8.4–10.2)
CHLORIDE SERPL-SCNC: 108 MMOL/L (ref 96–108)
CHOLEST SERPL-MCNC: 191 MG/DL
CO2 SERPL-SCNC: 27 MMOL/L (ref 21–32)
CREAT SERPL-MCNC: 0.75 MG/DL (ref 0.6–1.3)
EOSINOPHIL # BLD AUTO: 0.15 THOUSAND/ΜL (ref 0–0.61)
EOSINOPHIL NFR BLD AUTO: 3 % (ref 0–6)
ERYTHROCYTE [DISTWIDTH] IN BLOOD BY AUTOMATED COUNT: 12.3 % (ref 11.6–15.1)
EST. AVERAGE GLUCOSE BLD GHB EST-MCNC: 111 MG/DL
GFR SERPL CREATININE-BSD FRML MDRD: 95 ML/MIN/1.73SQ M
GLUCOSE P FAST SERPL-MCNC: 94 MG/DL (ref 65–99)
HBA1C MFR BLD: 5.5 %
HCT VFR BLD AUTO: 40.8 % (ref 34.8–46.1)
HDLC SERPL-MCNC: 58 MG/DL
HGB BLD-MCNC: 13.9 G/DL (ref 11.5–15.4)
IMM GRANULOCYTES # BLD AUTO: 0.01 THOUSAND/UL (ref 0–0.2)
IMM GRANULOCYTES NFR BLD AUTO: 0 % (ref 0–2)
LDLC SERPL CALC-MCNC: 119 MG/DL (ref 0–100)
LYMPHOCYTES # BLD AUTO: 1.72 THOUSANDS/ΜL (ref 0.6–4.47)
LYMPHOCYTES NFR BLD AUTO: 32 % (ref 14–44)
MCH RBC QN AUTO: 30.4 PG (ref 26.8–34.3)
MCHC RBC AUTO-ENTMCNC: 34.1 G/DL (ref 31.4–37.4)
MCV RBC AUTO: 89 FL (ref 82–98)
MONOCYTES # BLD AUTO: 0.29 THOUSAND/ΜL (ref 0.17–1.22)
MONOCYTES NFR BLD AUTO: 5 % (ref 4–12)
NEUTROPHILS # BLD AUTO: 3.21 THOUSANDS/ΜL (ref 1.85–7.62)
NEUTS SEG NFR BLD AUTO: 59 % (ref 43–75)
NONHDLC SERPL-MCNC: 133 MG/DL
NRBC BLD AUTO-RTO: 0 /100 WBCS
PLATELET # BLD AUTO: 286 THOUSANDS/UL (ref 149–390)
PMV BLD AUTO: 9.7 FL (ref 8.9–12.7)
POTASSIUM SERPL-SCNC: 3.9 MMOL/L (ref 3.5–5.3)
PROT SERPL-MCNC: 7.3 G/DL (ref 6.4–8.4)
RBC # BLD AUTO: 4.57 MILLION/UL (ref 3.81–5.12)
SODIUM SERPL-SCNC: 139 MMOL/L (ref 135–147)
TRIGL SERPL-MCNC: 69 MG/DL
TSH SERPL DL<=0.05 MIU/L-ACNC: 1.54 UIU/ML (ref 0.45–4.5)
WBC # BLD AUTO: 5.42 THOUSAND/UL (ref 4.31–10.16)

## 2022-08-12 PROCEDURE — 85025 COMPLETE CBC W/AUTO DIFF WBC: CPT

## 2022-08-12 PROCEDURE — 36415 COLL VENOUS BLD VENIPUNCTURE: CPT

## 2022-08-12 PROCEDURE — 80061 LIPID PANEL: CPT

## 2022-08-12 PROCEDURE — 80053 COMPREHEN METABOLIC PANEL: CPT

## 2022-08-12 PROCEDURE — 83036 HEMOGLOBIN GLYCOSYLATED A1C: CPT

## 2022-08-12 PROCEDURE — 82306 VITAMIN D 25 HYDROXY: CPT

## 2022-08-12 PROCEDURE — 86480 TB TEST CELL IMMUN MEASURE: CPT

## 2022-08-12 PROCEDURE — 84443 ASSAY THYROID STIM HORMONE: CPT

## 2022-08-12 NOTE — TELEPHONE ENCOUNTER
Patient is scheduled for colonoscopy on November 14 , 2022 at [facility]  with Leigh Ann Abdi MD  Patient is aware of pre-procedure prep of Miralax/Dulcolax and they will be called the day prior between 2 and 6 pm for time to report for procedure  Pre-procedure prep has been given to the patient  via [address] mail on August 12 , 2022

## 2022-08-15 DIAGNOSIS — R79.89 LOW VITAMIN D LEVEL: Primary | ICD-10-CM

## 2022-08-15 LAB
GAMMA INTERFERON BACKGROUND BLD IA-ACNC: 0.04 IU/ML
M TB IFN-G BLD-IMP: NEGATIVE
M TB IFN-G CD4+ BCKGRND COR BLD-ACNC: -0.01 IU/ML
M TB IFN-G CD4+ BCKGRND COR BLD-ACNC: 0.01 IU/ML
MITOGEN IGNF BCKGRD COR BLD-ACNC: >10 IU/ML

## 2022-08-16 ENCOUNTER — PATIENT MESSAGE (OUTPATIENT)
Dept: FAMILY MEDICINE CLINIC | Facility: CLINIC | Age: 47
End: 2022-08-16

## 2022-08-16 DIAGNOSIS — E55.9 VITAMIN D DEFICIENCY: Primary | ICD-10-CM

## 2022-08-17 RX ORDER — ERGOCALCIFEROL 1.25 MG/1
50000 CAPSULE ORAL WEEKLY
Qty: 12 CAPSULE | Refills: 2 | Status: SHIPPED | OUTPATIENT
Start: 2022-08-17

## 2022-09-09 ENCOUNTER — OFFICE VISIT (OUTPATIENT)
Dept: FAMILY MEDICINE CLINIC | Facility: CLINIC | Age: 47
End: 2022-09-09
Payer: COMMERCIAL

## 2022-09-09 VITALS
HEART RATE: 69 BPM | WEIGHT: 161.5 LBS | OXYGEN SATURATION: 100 % | HEIGHT: 61 IN | RESPIRATION RATE: 16 BRPM | DIASTOLIC BLOOD PRESSURE: 76 MMHG | TEMPERATURE: 97.5 F | BODY MASS INDEX: 30.49 KG/M2 | SYSTOLIC BLOOD PRESSURE: 128 MMHG

## 2022-09-09 DIAGNOSIS — E66.09 CLASS 1 OBESITY DUE TO EXCESS CALORIES WITH SERIOUS COMORBIDITY AND BODY MASS INDEX (BMI) OF 31.0 TO 31.9 IN ADULT: ICD-10-CM

## 2022-09-09 DIAGNOSIS — R51.9 NONINTRACTABLE EPISODIC HEADACHE, UNSPECIFIED HEADACHE TYPE: Primary | ICD-10-CM

## 2022-09-09 DIAGNOSIS — I10 BENIGN ESSENTIAL HYPERTENSION: ICD-10-CM

## 2022-09-09 PROCEDURE — 99213 OFFICE O/P EST LOW 20 MIN: CPT | Performed by: FAMILY MEDICINE

## 2022-09-09 NOTE — PROGRESS NOTES
1  Nonintractable episodic headache, unspecified headache type  Pt does not have migraines  Has occasional headaches associated with menses that are treated with over the counter Excedrin  Has never taken Imitrex  2  Class 1 obesity due to excess calories with serious comorbidity and body mass index (BMI) of 31 0 to 31 9 in adult  Doing well with diet changes and exercise for weight loss  Has never used Le mars  3  Benign essential hypertension  Well controlled on Lisinopril  Colonoscopy scheduled, mammogram scheduled  Will get flu shot in fall through work  No follow-ups on file  Subjective:   Radha is a 56 y o  female here today for a follow-up on her current medical conditions:    Patient Active Problem List   Diagnosis    Dense breast tissue    Family history of breast cancer    Thyroid nodule    Vitamin D deficiency    Class 1 obesity due to excess calories with serious comorbidity and body mass index (BMI) of 31 0 to 31 9 in adult    Low vitamin D level    Rectocele    Benign essential hypertension       Patient Care Team:  Karthik Bautista DO as PCP - General (Family Medicine)    Current Medications:  Current Outpatient Medications   Medication Sig Dispense Refill    Ascorbic Acid (vitamin C) 100 MG tablet Take 100 mg by mouth daily      cyanocobalamin (VITAMIN B-12) 250 MCG tablet Take 250 mcg by mouth daily      ergocalciferol (VITAMIN D2) 50,000 units Take 1 capsule (50,000 Units total) by mouth once a week Long term  12 capsule 2    folic acid (FOLVITE) 1 mg tablet Take 400 mcg by mouth daily      lisinopril (ZESTRIL) 10 mg tablet Take 1 tablet (10 mg total) by mouth daily 90 tablet 2    Multiple Vitamins-Minerals (ZINC PO) Take by mouth      Omega-3 Fatty Acids (fish oil) 1,000 mg Take 1,000 mg by mouth daily      VITAMIN A PO Take by mouth       No current facility-administered medications for this visit         HPI:  Chief Complaint   Patient presents with   Kenia Courser clearance     Pt is here for clearance for the [employer] stating she does not have Migraines and was prescribed Wegovy, but never filled or took the medication  -- Above per clinical staff and reviewed  --  ? No My Sticky Note on file  Today:  Needs her updated medical records for [place]   Questions regarding headaches - pt was prescribed imitrex one time but never needed it  Had a headache one time with her menses and imitrex was prescribed  Instead took Excedrin and it helped  She gets rare headaches with menses  Rates 6-7/10  No photo or phonophobia  No nausea  Frontal, not one sided  No family history of migraines  Excedrin works  Jeff  was not started   Eating better, lower carbs, more protein and intermittent fasting  Working out more  Down 4 pounds  The following portions of the patient's history were reviewed and updated as appropriate: allergies, current medications, past family history, past medical history, past social history, past surgical history and problem list     Objective:  Vitals:  /76   Pulse 69   Temp 97 5 °F (36 4 °C) (Temporal)   Resp 16   Ht 5' 0 5" (1 537 m)   Wt 73 3 kg (161 lb 8 oz)   SpO2 100%   BMI 31 02 kg/m²    Wt Readings from Last 3 Encounters:   09/09/22 73 3 kg (161 lb 8 oz)   08/05/22 74 4 kg (164 lb)   12/10/21 74 4 kg (164 lb)      BP Readings from Last 3 Encounters:   09/09/22 128/76   08/05/22 126/78   12/10/21 97/56        Review of Systems   She has no other concerns  No unexpected weight changes  No chest pain, SOB, or palpitations  No GERD  No changes in bowels or bladder  Sleeping well  No mood changes  Physical Exam   Constitutional:  she appears well-developed and well-nourished  HENT: Head: Normocephalic  Neck: Neck supple  Cardiovascular: Normal rate, regular rhythm and normal heart sounds  Pulmonary/Chest: Effort normal and breath sounds normal  No wheezes, rales, or rhonchi  Abdominal: Soft   Bowel sounds are normal  There is no tenderness  No hepatosplenomegaly  Musculoskeletal: she exhibits no edema  Lymphadenopathy: she has no cervical adenopathy  Neurological: she is alert and oriented to person, place, and time  Skin: Skin is warm and dry  Psychiatric: she has a normal mood and affect  her behavior is normal  Thought content normal      BMI Counseling: Body mass index is 31 02 kg/m²  The BMI is above normal  Nutrition recommendations include decreasing portion sizes  Exercise recommendations include exercising 3-5 times per week  Rationale for BMI follow-up plan is due to patient being overweight or obese

## 2022-10-07 ENCOUNTER — HOSPITAL ENCOUNTER (OUTPATIENT)
Dept: MAMMOGRAPHY | Facility: HOSPITAL | Age: 47
Discharge: HOME | End: 2022-10-07
Attending: OBSTETRICS & GYNECOLOGY
Payer: COMMERCIAL

## 2022-10-07 VITALS — BODY MASS INDEX: 31.73 KG/M2 | WEIGHT: 161.6 LBS | HEIGHT: 60 IN

## 2022-10-07 DIAGNOSIS — Z12.31 ENCOUNTER FOR SCREENING MAMMOGRAM FOR BREAST CANCER: ICD-10-CM

## 2022-10-07 PROCEDURE — 77067 SCR MAMMO BI INCL CAD: CPT

## 2022-10-07 PROCEDURE — 77063 BREAST TOMOSYNTHESIS BI: CPT

## 2022-11-14 ENCOUNTER — ANESTHESIA EVENT (OUTPATIENT)
Dept: GASTROENTEROLOGY | Facility: AMBULARY SURGERY CENTER | Age: 47
End: 2022-11-14

## 2022-11-14 ENCOUNTER — ANESTHESIA (OUTPATIENT)
Dept: GASTROENTEROLOGY | Facility: AMBULARY SURGERY CENTER | Age: 47
End: 2022-11-14

## 2022-11-14 ENCOUNTER — HOSPITAL ENCOUNTER (OUTPATIENT)
Dept: GASTROENTEROLOGY | Facility: AMBULARY SURGERY CENTER | Age: 47
Setting detail: OUTPATIENT SURGERY
Discharge: HOME | End: 2022-11-14
Attending: INTERNAL MEDICINE

## 2022-11-14 VITALS
HEART RATE: 64 BPM | SYSTOLIC BLOOD PRESSURE: 166 MMHG | RESPIRATION RATE: 18 BRPM | DIASTOLIC BLOOD PRESSURE: 74 MMHG | TEMPERATURE: 97.3 F | WEIGHT: 156 LBS | BODY MASS INDEX: 30.63 KG/M2 | HEIGHT: 60 IN | OXYGEN SATURATION: 100 %

## 2022-11-14 DIAGNOSIS — Z12.11 SCREEN FOR COLON CANCER: ICD-10-CM

## 2022-11-14 RX ORDER — SODIUM CHLORIDE, SODIUM LACTATE, POTASSIUM CHLORIDE, CALCIUM CHLORIDE 600; 310; 30; 20 MG/100ML; MG/100ML; MG/100ML; MG/100ML
INJECTION, SOLUTION INTRAVENOUS CONTINUOUS PRN
Status: DISCONTINUED | OUTPATIENT
Start: 2022-11-14 | End: 2022-11-14

## 2022-11-14 RX ORDER — PROPOFOL 10 MG/ML
INJECTION, EMULSION INTRAVENOUS AS NEEDED
Status: DISCONTINUED | OUTPATIENT
Start: 2022-11-14 | End: 2022-11-14

## 2022-11-14 RX ORDER — LIDOCAINE HYDROCHLORIDE 10 MG/ML
INJECTION, SOLUTION EPIDURAL; INFILTRATION; INTRACAUDAL; PERINEURAL AS NEEDED
Status: DISCONTINUED | OUTPATIENT
Start: 2022-11-14 | End: 2022-11-14

## 2022-11-14 RX ADMIN — PROPOFOL 50 MG: 10 INJECTION, EMULSION INTRAVENOUS at 15:05

## 2022-11-14 RX ADMIN — PROPOFOL 100 MG: 10 INJECTION, EMULSION INTRAVENOUS at 15:03

## 2022-11-14 RX ADMIN — LIDOCAINE HYDROCHLORIDE 50 MG: 10 INJECTION, SOLUTION EPIDURAL; INFILTRATION; INTRACAUDAL; PERINEURAL at 15:03

## 2022-11-14 RX ADMIN — SODIUM CHLORIDE, SODIUM LACTATE, POTASSIUM CHLORIDE, AND CALCIUM CHLORIDE: .6; .31; .03; .02 INJECTION, SOLUTION INTRAVENOUS at 15:01

## 2022-11-14 RX ADMIN — PROPOFOL 50 MG: 10 INJECTION, EMULSION INTRAVENOUS at 15:14

## 2022-11-14 RX ADMIN — PROPOFOL 50 MG: 10 INJECTION, EMULSION INTRAVENOUS at 15:10

## 2022-11-14 NOTE — ANESTHESIA PREPROCEDURE EVALUATION
Procedure:  COLONOSCOPY    Relevant Problems   CARDIO   (+) Benign essential hypertension      Other   (+) Class 1 obesity due to excess calories with serious comorbidity and body mass index (BMI) of 31 0 to 31 9 in adult        Physical Exam    Airway    Mallampati score: I  TM Distance: >3 FB  Neck ROM: full     Dental   No notable dental hx     Cardiovascular      Pulmonary      Other Findings        Anesthesia Plan  ASA Score- 2     Anesthesia Type- IV sedation with anesthesia with ASA Monitors  Additional Monitors:   Airway Plan:           Plan Factors-Exercise tolerance (METS): >4 METS  Chart reviewed  Patient summary reviewed  Patient is not a current smoker  Obstructive sleep apnea risk education given perioperatively  Induction- intravenous  Postoperative Plan-     Informed Consent- Anesthetic plan and risks discussed with patient  I personally reviewed this patient with the CRNA  Discussed and agreed on the Anesthesia Plan with the CRNA  Tona Son

## 2022-11-14 NOTE — H&P
History and Physical - [facility]  Radha Miguel Bonilla 56 y o  female MRN: [id number]    HPI: Zackary Ordoñez is a 56y o  year old female who presents with colon cancer screening      Review of Systems    Historical Information   Past Medical History:   Diagnosis Date   • Disease of thyroid gland     2014 nodules on US   • Dizziness    • Hypertension    • Infertility, female    • Multiple thyroid nodules    • Obesity     Resolved 2016   • Palpitations    • Palpitations     Resolved 2016   • PONV (postoperative nausea and vomiting)    • Thyroid nodule     Last Assessed: 2014    • Thyroid nodule 2019   • Vertigo      Past Surgical History:   Procedure Laterality Date   • BREAST BIOPSY Right 2001    Percutaneous Needle Core Right , age 33   •  SECTION     • CHOLECYSTECTOMY      Laparoscopic    • COLPORRHAPHY N/A 2020    Procedure: POST   COLPORRHAPHY;  Surgeon: Keila Whittaker MD;  Location: [facility];  Service: UroGynecology          • ENDOMETRIAL ABLATION     • LASIK  10/2020   • MO EXCISE EXCESS SKIN TISSUE,ABDOMEN, ADD-ON N/A 12/10/2021    Procedure: ABDOMINOPLASTY;  Surgeon: Simin Tran MD;  Location: [address] [place] [address] MAIN OR;  Service: Plastics   • TUBAL LIGATION     • WISDOM TOOTH EXTRACTION       Social History   Social History     Substance and Sexual Activity   Alcohol Use Yes    Comment: Rarely     Social History     Substance and Sexual Activity   Drug Use No     Social History     Tobacco Use   Smoking Status Never Smoker   Smokeless Tobacco Never Used     Family History   Problem Relation Age of Onset   • Hypertension Mother 36   • Heart disease Mother         rheumatic heart disease, MVP   • Nephrolithiasis Mother    • Diabetes Mother    • Hypertension Maternal Grandmother 36   • Diabetes Maternal Grandmother    • Other Maternal Grandfather         CABG    • Stroke Maternal Grandfather         Stroke Syndrome    • Prostate cancer Maternal Grandfather 61   • Heart disease Maternal Grandfather 79        hx CABG   • Cancer Maternal Grandfather         Prostate   • Coronary aneurysm Maternal Grandfather         MI at a young age    • Diabetes Paternal Grandmother    • Cancer Paternal Grandmother 79        Vulvar    • Stroke Paternal Grandmother [de-identified]   • Hypertension Paternal Grandmother    • Hyperthyroidism Family    • Hypothyroidism Family    • Lymphoma Maternal Uncle 36   • Lymphoma Paternal Aunt 48        non hodgkins follicular lymphoma   • Hypothyroidism Paternal Aunt    • Breast cancer Cousin 52        bilateral , mets to abd/pelvis   Genetic testing negative   • Prostate cancer Father 72   • No Known Problems Sister    • No Known Problems Daughter    • No Known Problems Sister    • No Known Problems Sister    • No Known Problems Brother    • No Known Problems Brother    • No Known Problems Daughter    • No Known Problems Son        Meds/Allergies     (Not in a hospital admission)      No Known Allergies    Objective     /63   Pulse 60   Temp (!) 97 1 °F (36 2 °C) (Temporal)   Resp 18   Ht 5' (1 524 m)   Wt 70 8 kg (156 lb)   LMP  (LMP Unknown)   SpO2 100%   BMI 30 47 kg/m²       PHYSICAL EXAM    Gen: NAD  CV: RRR  CHEST: Clear  ABD: soft, NT/ND  EXT: no edema  Neuro: AAO      ASSESSMENT/PLAN:  This is a 56y o  year old female here for colon cancer screening      PLAN:   Procedure: colonoscopy

## 2022-11-14 NOTE — ANESTHESIA POSTPROCEDURE EVALUATION
Post-Op Assessment Note    CV Status:  Stable  Pain Score: 0    Pain management: adequate     Mental Status:  Alert   Hydration Status:  Euvolemic   PONV Controlled:  None   Airway Patency:  Patent      Post Op Vitals Reviewed: Yes      Staff: CRNA         No complications documented      /63 (11/14/22 1521)    Temp (!) 97 3 °F (36 3 °C) (11/14/22 1521)    Pulse 65 (11/14/22 1521)   Resp 18 (11/14/22 1521)    SpO2 100 % (11/14/22 1521)

## 2022-11-22 ENCOUNTER — PATIENT MESSAGE (OUTPATIENT)
Dept: FAMILY MEDICINE CLINIC | Facility: CLINIC | Age: 47
End: 2022-11-22

## 2022-11-22 DIAGNOSIS — E55.9 VITAMIN D DEFICIENCY: ICD-10-CM

## 2022-11-22 DIAGNOSIS — R79.89 LOW VITAMIN D LEVEL: Primary | ICD-10-CM

## 2022-12-09 ENCOUNTER — APPOINTMENT (OUTPATIENT)
Dept: LAB | Facility: CLINIC | Age: 47
End: 2022-12-09

## 2022-12-09 DIAGNOSIS — E55.9 VITAMIN D DEFICIENCY: ICD-10-CM

## 2022-12-09 LAB — 25(OH)D3 SERPL-MCNC: 48.1 NG/ML (ref 30–100)

## 2022-12-12 ENCOUNTER — PATIENT MESSAGE (OUTPATIENT)
Dept: FAMILY MEDICINE CLINIC | Facility: CLINIC | Age: 47
End: 2022-12-12

## 2022-12-12 DIAGNOSIS — E55.9 VITAMIN D DEFICIENCY: ICD-10-CM

## 2022-12-13 RX ORDER — ERGOCALCIFEROL 1.25 MG/1
50000 CAPSULE ORAL WEEKLY
Qty: 12 CAPSULE | Refills: 2 | Status: SHIPPED | OUTPATIENT
Start: 2022-12-13

## 2023-01-10 DIAGNOSIS — N92.6 IRREGULAR MENSES: Primary | ICD-10-CM

## 2023-01-10 RX ORDER — ETONOGESTREL AND ETHINYL ESTRADIOL 11.7; 2.7 MG/1; MG/1
INSERT, EXTENDED RELEASE VAGINAL
Qty: 3 EACH | Refills: 2 | Status: SHIPPED | OUTPATIENT
Start: 2023-01-10

## 2023-05-11 ENCOUNTER — PATIENT MESSAGE (OUTPATIENT)
Dept: FAMILY MEDICINE CLINIC | Facility: CLINIC | Age: 48
End: 2023-05-11

## 2023-05-11 DIAGNOSIS — Z00.8 HEALTH EXAMINATION IN POPULATION SURVEYS: Primary | ICD-10-CM

## 2023-05-11 DIAGNOSIS — E55.9 VITAMIN D DEFICIENCY: ICD-10-CM

## 2023-05-11 DIAGNOSIS — E87.6 HYPOKALEMIA: ICD-10-CM

## 2023-05-11 DIAGNOSIS — E66.09 CLASS 1 OBESITY DUE TO EXCESS CALORIES WITH SERIOUS COMORBIDITY AND BODY MASS INDEX (BMI) OF 31.0 TO 31.9 IN ADULT: ICD-10-CM

## 2023-05-11 DIAGNOSIS — I10 BENIGN ESSENTIAL HYPERTENSION: ICD-10-CM

## 2023-05-12 ENCOUNTER — PATIENT MESSAGE (OUTPATIENT)
Dept: FAMILY MEDICINE CLINIC | Facility: CLINIC | Age: 48
End: 2023-05-12

## 2023-05-12 DIAGNOSIS — E04.1 THYROID NODULE: Primary | ICD-10-CM

## 2023-05-12 NOTE — PATIENT COMMUNICATION
Please schedule her on a Friday afternoon  Have her bring her cuff in for calibration  Have her check it twice a day (first thing in the morning and a second time of the day) for a week before the visit  I will order labs to be done also

## 2023-05-16 ENCOUNTER — OFFICE VISIT (OUTPATIENT)
Dept: FAMILY MEDICINE CLINIC | Facility: CLINIC | Age: 48
End: 2023-05-16

## 2023-05-16 VITALS
HEIGHT: 60 IN | OXYGEN SATURATION: 98 % | HEART RATE: 84 BPM | RESPIRATION RATE: 18 BRPM | DIASTOLIC BLOOD PRESSURE: 108 MMHG | WEIGHT: 149 LBS | TEMPERATURE: 97.1 F | SYSTOLIC BLOOD PRESSURE: 182 MMHG | BODY MASS INDEX: 29.25 KG/M2

## 2023-05-16 DIAGNOSIS — I10 ACCELERATED HYPERTENSION: Primary | ICD-10-CM

## 2023-05-16 RX ORDER — LISINOPRIL AND HYDROCHLOROTHIAZIDE 25; 20 MG/1; MG/1
1 TABLET ORAL DAILY
Qty: 90 TABLET | Refills: 3 | Status: SHIPPED | OUTPATIENT
Start: 2023-05-16

## 2023-05-16 NOTE — PROGRESS NOTES
Radha was seen today for hypertension  Diagnoses and all orders for this visit:    Accelerated hypertension  -     POCT ECG today sinus wandy   -     lisinopril-hydrochlorothiazide (PRINZIDE,ZESTORETIC) 20-25 MG per tablet; Take 1 tablet by mouth daily  -     Echo complete w/ contrast if indicated; Future     Stop lisinopril 10 mg   Start lisinopril-hydrochlorothiazide 20-25mg  Get fasting blood work   Schedule your echocardiogram     Return in about 1 month (around 6/16/2023) for bp with doctor   Subjective:   Radha is a 53 y o  female here today for a follow-up on her current medical conditions:    Patient Active Problem List   Diagnosis   • Dense breast tissue   • Family history of breast cancer   • Thyroid nodule   • Vitamin D deficiency   • Class 1 obesity due to excess calories with serious comorbidity and body mass index (BMI) of 31 0 to 31 9 in adult   • Low vitamin D level   • Rectocele   • Benign essential hypertension       Patient Care Team:  Maxim Watkins DO as PCP - General (Family Medicine)    Current Medications:  Current Outpatient Medications   Medication Sig Dispense Refill   • Ascorbic Acid (vitamin C) 100 MG tablet Take 100 mg by mouth daily     • cyanocobalamin (VITAMIN B-12) 250 MCG tablet Take 250 mcg by mouth daily     • ergocalciferol (VITAMIN D2) 50,000 units Take 1 capsule (50,000 Units total) by mouth once a week Long term  12 capsule 2   • folic acid (FOLVITE) 1 mg tablet Take 400 mcg by mouth daily     • lisinopril-hydrochlorothiazide (PRINZIDE,ZESTORETIC) 20-25 MG per tablet Take 1 tablet by mouth daily 90 tablet 3   • Omega-3 Fatty Acids (fish oil) 1,000 mg Take 1,000 mg by mouth daily     • VITAMIN A PO Take by mouth       No current facility-administered medications for this visit  HPI:  Chief Complaint   Patient presents with   • Hypertension     Higher even on medication 180/100 this weekend   Has been having headache, eye twitching, chest pressure (uncomfortable)  Symptomatic for about a month  -- Above per clinical staff and reviewed  --    PHQ-2/9 Depression Screening         ? PE due 8/5/23  thyroid us ordered   BW not done - reprint   Today:  Feeling off since mid march   Started nuva ring endo david  Was high at work but would come down   This weekend 180/100 this weekend after 2 lisinopril at 5 am, blood pressure was 9 am  156/90 with rest  140/90 at home today   Pressure in head  Eye twitching some   Heaviness in chest   Perimenopausal symptoms for 1 5 yrs - was getting irregular, hot flashes, night sweats  Not much salt, strict about diet, not eating out or processed food  3 times a week spin bike and walks 7 miles     The following portions of the patient's history were reviewed and updated as appropriate: allergies, current medications, past family history, past medical history, past social history, past surgical history and problem list     Objective:  Vitals:  BP (!) 182/108 (BP Location: Left arm, Patient Position: Sitting, Cuff Size: Standard)   Pulse 84   Temp (!) 97 1 °F (36 2 °C) (Temporal)   Resp 18   Ht 5' (1 524 m)   Wt 67 6 kg (149 lb)   SpO2 98%   BMI 29 10 kg/m²    Wt Readings from Last 3 Encounters:   05/16/23 67 6 kg (149 lb)   11/14/22 70 8 kg (156 lb)   10/07/22 73 3 kg (161 lb 9 6 oz)      BP Readings from Last 3 Encounters:   05/16/23 (!) 182/108   11/14/22 166/74   09/09/22 128/76        Review of Systems   She has no other concerns  No unexpected weight changes  No chest pain, SOB, or palpitations  No GERD  No changes in bowels or bladder  Sleeping well  No mood changes  Physical Exam   Constitutional:  she appears well-developed and well-nourished  HENT: Head: Normocephalic  Neck: Neck supple  Cardiovascular: Normal rate, regular rhythm and normal heart sounds  Pulmonary/Chest: Effort normal and breath sounds normal  No wheezes, rales, or rhonchi  Abdominal: Soft   Bowel sounds are normal  There is no tenderness  No hepatosplenomegaly  Musculoskeletal: she exhibits no edema  Lymphadenopathy: she has no cervical adenopathy  Neurological: she is alert and oriented to person, place, and time  Skin: Skin is warm and dry  Psychiatric: she has a normal mood and affect  her behavior is normal  Thought content normal      BMI Counseling: Body mass index is 29 1 kg/m²  The BMI is above normal  Nutrition recommendations include decreasing portion sizes  Exercise recommendations include exercising 3-5 times per week  Rationale for BMI follow-up plan is due to patient being overweight or obese

## 2023-05-16 NOTE — PATIENT INSTRUCTIONS
Stop lisinopril 10 mg   Start lisinopril-hydrochlorothiazide 20-25mg  Get fasting blood work   Schedule your echocardiogram

## 2023-05-19 ENCOUNTER — APPOINTMENT (OUTPATIENT)
Dept: LAB | Facility: HOSPITAL | Age: 48
End: 2023-05-19

## 2023-05-19 DIAGNOSIS — E55.9 VITAMIN D DEFICIENCY: ICD-10-CM

## 2023-05-19 DIAGNOSIS — I10 BENIGN ESSENTIAL HYPERTENSION: ICD-10-CM

## 2023-05-19 DIAGNOSIS — E66.09 CLASS 1 OBESITY DUE TO EXCESS CALORIES WITH SERIOUS COMORBIDITY AND BODY MASS INDEX (BMI) OF 31.0 TO 31.9 IN ADULT: ICD-10-CM

## 2023-05-19 DIAGNOSIS — Z00.8 HEALTH EXAMINATION IN POPULATION SURVEYS: ICD-10-CM

## 2023-05-19 DIAGNOSIS — Z00.8 HEALTH EXAMINATION IN POPULATION SURVEY: ICD-10-CM

## 2023-05-19 LAB
25(OH)D3 SERPL-MCNC: 49.2 NG/ML (ref 30–100)
ALBUMIN SERPL BCP-MCNC: 4.5 G/DL (ref 3.5–5)
ALP SERPL-CCNC: 58 U/L (ref 34–104)
ALT SERPL W P-5'-P-CCNC: 15 U/L (ref 7–52)
ANION GAP SERPL CALCULATED.3IONS-SCNC: 9 MMOL/L (ref 4–13)
AST SERPL W P-5'-P-CCNC: 14 U/L (ref 13–39)
BASOPHILS # BLD AUTO: 0.03 THOUSANDS/ÂΜL (ref 0–0.1)
BASOPHILS NFR BLD AUTO: 1 % (ref 0–1)
BILIRUB SERPL-MCNC: 0.65 MG/DL (ref 0.2–1)
BUN SERPL-MCNC: 17 MG/DL (ref 5–25)
CALCIUM SERPL-MCNC: 10.3 MG/DL (ref 8.4–10.2)
CHLORIDE SERPL-SCNC: 101 MMOL/L (ref 96–108)
CHOLEST SERPL-MCNC: 205 MG/DL
CO2 SERPL-SCNC: 30 MMOL/L (ref 21–32)
CREAT SERPL-MCNC: 0.77 MG/DL (ref 0.6–1.3)
EOSINOPHIL # BLD AUTO: 0.15 THOUSAND/ÂΜL (ref 0–0.61)
EOSINOPHIL NFR BLD AUTO: 3 % (ref 0–6)
ERYTHROCYTE [DISTWIDTH] IN BLOOD BY AUTOMATED COUNT: 12.4 % (ref 11.6–15.1)
EST. AVERAGE GLUCOSE BLD GHB EST-MCNC: 105 MG/DL
GFR SERPL CREATININE-BSD FRML MDRD: 92 ML/MIN/1.73SQ M
GLUCOSE P FAST SERPL-MCNC: 98 MG/DL (ref 65–99)
HBA1C MFR BLD: 5.3 %
HCT VFR BLD AUTO: 45.3 % (ref 34.8–46.1)
HDLC SERPL-MCNC: 67 MG/DL
HGB BLD-MCNC: 15.3 G/DL (ref 11.5–15.4)
IMM GRANULOCYTES # BLD AUTO: 0.01 THOUSAND/UL (ref 0–0.2)
IMM GRANULOCYTES NFR BLD AUTO: 0 % (ref 0–2)
LDLC SERPL CALC-MCNC: 123 MG/DL (ref 0–100)
LYMPHOCYTES # BLD AUTO: 1.91 THOUSANDS/ÂΜL (ref 0.6–4.47)
LYMPHOCYTES NFR BLD AUTO: 35 % (ref 14–44)
MCH RBC QN AUTO: 30.3 PG (ref 26.8–34.3)
MCHC RBC AUTO-ENTMCNC: 33.8 G/DL (ref 31.4–37.4)
MCV RBC AUTO: 90 FL (ref 82–98)
MONOCYTES # BLD AUTO: 0.35 THOUSAND/ÂΜL (ref 0.17–1.22)
MONOCYTES NFR BLD AUTO: 6 % (ref 4–12)
NEUTROPHILS # BLD AUTO: 3.02 THOUSANDS/ÂΜL (ref 1.85–7.62)
NEUTS SEG NFR BLD AUTO: 55 % (ref 43–75)
NONHDLC SERPL-MCNC: 138 MG/DL
NRBC BLD AUTO-RTO: 0 /100 WBCS
PLATELET # BLD AUTO: 320 THOUSANDS/UL (ref 149–390)
PMV BLD AUTO: 9.5 FL (ref 8.9–12.7)
POTASSIUM SERPL-SCNC: 3.4 MMOL/L (ref 3.5–5.3)
PROT SERPL-MCNC: 7.8 G/DL (ref 6.4–8.4)
RBC # BLD AUTO: 5.05 MILLION/UL (ref 3.81–5.12)
SODIUM SERPL-SCNC: 140 MMOL/L (ref 135–147)
TRIGL SERPL-MCNC: 73 MG/DL
TSH SERPL DL<=0.05 MIU/L-ACNC: 0.69 UIU/ML (ref 0.45–4.5)
WBC # BLD AUTO: 5.47 THOUSAND/UL (ref 4.31–10.16)

## 2023-05-26 ENCOUNTER — HOSPITAL ENCOUNTER (OUTPATIENT)
Dept: NON INVASIVE DIAGNOSTICS | Facility: HOSPITAL | Age: 48
Discharge: HOME | End: 2023-05-26

## 2023-05-26 VITALS
DIASTOLIC BLOOD PRESSURE: 79 MMHG | SYSTOLIC BLOOD PRESSURE: 126 MMHG | BODY MASS INDEX: 29.25 KG/M2 | HEART RATE: 66 BPM | WEIGHT: 149 LBS | HEIGHT: 60 IN

## 2023-05-26 DIAGNOSIS — I10 ACCELERATED HYPERTENSION: ICD-10-CM

## 2023-05-26 LAB
AORTIC ROOT: 2.9 CM
APICAL FOUR CHAMBER EJECTION FRACTION: 51 %
AV LVOT PEAK GRADIENT: 5 MMHG
AV PEAK GRADIENT: 7 MMHG
DOP CALC LVOT DIAMETER: 1.9 CM
E WAVE DECELERATION TIME: 256 MS
E/A RATIO: 0.95
FRACTIONAL SHORTENING: 36 (ref 28–44)
INTERVENTRICULAR SEPTUM IN DIASTOLE (PARASTERNAL SHORT AXIS VIEW): 0.8 CM
INTERVENTRICULAR SEPTUM: 0.8 CM (ref 0.6–1.1)
LAAS-AP2: 12.6 CM2
LAAS-AP4: 14.7 CM2
LEFT ATRIUM AREA SYSTOLE SINGLE PLANE A4C: 15 CM2
LEFT ATRIUM SIZE: 3 CM
LEFT ATRIUM VOLUME INDEX (MOD BIPLANE): 24.2
LEFT INTERNAL DIMENSION IN SYSTOLE: 2.7 CM (ref 2.1–4)
LEFT VENTRICLE DIASTOLIC VOLUME (MOD BIPLANE): 99 ML
LEFT VENTRICLE SYSTOLIC VOLUME (MOD BIPLANE): 44 ML
LEFT VENTRICULAR INTERNAL DIMENSION IN DIASTOLE: 4.2 CM (ref 3.5–6)
LEFT VENTRICULAR POSTERIOR WALL IN END DIASTOLE: 0.8 CM
LEFT VENTRICULAR STROKE VOLUME: 51 ML
LV EF: 56 %
LVSV (TEICH): 51 ML
MV E'TISSUE VEL-LAT: 12 CM/S
MV E'TISSUE VEL-SEP: 11 CM/S
MV PEAK A VEL: 0.74 M/S
MV PEAK E VEL: 70 CM/S
MV STENOSIS PRESSURE HALF TIME: 74 MS
MV VALVE AREA P 1/2 METHOD: 3
PV PEAK GRADIENT: 4 MMHG
RIGHT ATRIUM AREA SYSTOLE A4C: 12.1 CM2
RIGHT VENTRICLE ID DIMENSION: 2.6 CM
SL CV LEFT ATRIUM LENGTH A2C: 3.7 CM
SL CV LV EF: 60
SL CV PED ECHO LEFT VENTRICLE DIASTOLIC VOLUME (MOD BIPLANE) 2D: 79 ML
SL CV PED ECHO LEFT VENTRICLE SYSTOLIC VOLUME (MOD BIPLANE) 2D: 28 ML
TR MAX PG: 18 MMHG
TR PEAK VELOCITY: 2.1 M/S
TRICUSPID ANNULAR PLANE SYSTOLIC EXCURSION: 2.4 CM
TRICUSPID VALVE PEAK REGURGITATION VELOCITY: 2.14 M/S

## 2023-06-12 ENCOUNTER — HOSPITAL ENCOUNTER (OUTPATIENT)
Dept: RADIOLOGY | Facility: HOSPITAL | Age: 48
Discharge: HOME | End: 2023-06-12
Payer: COMMERCIAL

## 2023-06-12 ENCOUNTER — APPOINTMENT (OUTPATIENT)
Dept: LAB | Facility: HOSPITAL | Age: 48
End: 2023-06-12
Payer: COMMERCIAL

## 2023-06-12 DIAGNOSIS — E87.6 HYPOKALEMIA: ICD-10-CM

## 2023-06-12 DIAGNOSIS — E04.1 THYROID NODULE: ICD-10-CM

## 2023-06-12 LAB
ANION GAP SERPL CALCULATED.3IONS-SCNC: 7 MMOL/L (ref 4–13)
BUN SERPL-MCNC: 14 MG/DL (ref 5–25)
CALCIUM SERPL-MCNC: 10 MG/DL (ref 8.4–10.2)
CHLORIDE SERPL-SCNC: 100 MMOL/L (ref 96–108)
CO2 SERPL-SCNC: 31 MMOL/L (ref 21–32)
CREAT SERPL-MCNC: 0.8 MG/DL (ref 0.6–1.3)
GFR SERPL CREATININE-BSD FRML MDRD: 88 ML/MIN/1.73SQ M
GLUCOSE SERPL-MCNC: 109 MG/DL (ref 65–140)
POTASSIUM SERPL-SCNC: 3.3 MMOL/L (ref 3.5–5.3)
SODIUM SERPL-SCNC: 138 MMOL/L (ref 135–147)

## 2023-06-12 PROCEDURE — 80048 BASIC METABOLIC PNL TOTAL CA: CPT

## 2023-06-12 PROCEDURE — 76536 US EXAM OF HEAD AND NECK: CPT

## 2023-06-12 PROCEDURE — 36415 COLL VENOUS BLD VENIPUNCTURE: CPT

## 2023-06-13 ENCOUNTER — OFFICE VISIT (OUTPATIENT)
Dept: FAMILY MEDICINE CLINIC | Facility: CLINIC | Age: 48
End: 2023-06-13
Payer: COMMERCIAL

## 2023-06-13 VITALS
SYSTOLIC BLOOD PRESSURE: 108 MMHG | HEART RATE: 76 BPM | WEIGHT: 145.2 LBS | BODY MASS INDEX: 28.51 KG/M2 | HEIGHT: 60 IN | TEMPERATURE: 97.7 F | OXYGEN SATURATION: 100 % | RESPIRATION RATE: 18 BRPM | DIASTOLIC BLOOD PRESSURE: 78 MMHG

## 2023-06-13 DIAGNOSIS — I10 BENIGN ESSENTIAL HYPERTENSION: Primary | ICD-10-CM

## 2023-06-13 DIAGNOSIS — E87.6 HYPOKALEMIA: ICD-10-CM

## 2023-06-13 PROCEDURE — 99214 OFFICE O/P EST MOD 30 MIN: CPT | Performed by: FAMILY MEDICINE

## 2023-06-13 RX ORDER — LISINOPRIL 20 MG/1
20 TABLET ORAL DAILY
Qty: 90 TABLET | Refills: 3 | Status: SHIPPED | OUTPATIENT
Start: 2023-06-13

## 2023-06-13 RX ORDER — SPIRONOLACTONE 25 MG/1
25 TABLET ORAL DAILY
Qty: 90 TABLET | Refills: 3 | Status: SHIPPED | OUTPATIENT
Start: 2023-06-13

## 2023-06-15 ENCOUNTER — APPOINTMENT (OUTPATIENT)
Dept: LAB | Facility: CLINIC | Age: 48
End: 2023-06-15
Payer: COMMERCIAL

## 2023-06-15 DIAGNOSIS — I10 BENIGN ESSENTIAL HYPERTENSION: ICD-10-CM

## 2023-06-15 DIAGNOSIS — E87.6 HYPOKALEMIA: ICD-10-CM

## 2023-06-15 LAB
ANION GAP SERPL CALCULATED.3IONS-SCNC: 5 MMOL/L (ref 4–13)
BUN SERPL-MCNC: 25 MG/DL (ref 5–25)
CALCIUM SERPL-MCNC: 9.9 MG/DL (ref 8.4–10.2)
CHLORIDE SERPL-SCNC: 106 MMOL/L (ref 96–108)
CO2 SERPL-SCNC: 30 MMOL/L (ref 21–32)
CREAT SERPL-MCNC: 0.71 MG/DL (ref 0.6–1.3)
GFR SERPL CREATININE-BSD FRML MDRD: 101 ML/MIN/1.73SQ M
GLUCOSE P FAST SERPL-MCNC: 94 MG/DL (ref 65–99)
POTASSIUM SERPL-SCNC: 4 MMOL/L (ref 3.5–5.3)
SODIUM SERPL-SCNC: 141 MMOL/L (ref 135–147)

## 2023-06-15 PROCEDURE — 36415 COLL VENOUS BLD VENIPUNCTURE: CPT

## 2023-06-15 PROCEDURE — 84244 ASSAY OF RENIN: CPT

## 2023-06-15 PROCEDURE — 82088 ASSAY OF ALDOSTERONE: CPT

## 2023-06-15 PROCEDURE — 80048 BASIC METABOLIC PNL TOTAL CA: CPT

## 2023-06-19 LAB — ALDOST SERPL-MCNC: 14.8 NG/DL (ref 0–30)

## 2023-06-29 LAB — RENIN PLAS-CCNC: 1.3 NG/ML/HR (ref 0.17–5.38)

## 2023-06-30 LAB
ALDOST SERPL-MCNC: 12.3 NG/DL (ref 0–30)
ALDOST/RENIN PLAS-RTO: 5.5 {RATIO} (ref 0–30)
RENIN PLAS-CCNC: 2.25 NG/ML/HR (ref 0.17–5.38)

## 2023-07-17 ENCOUNTER — PATIENT MESSAGE (OUTPATIENT)
Dept: FAMILY MEDICINE CLINIC | Facility: CLINIC | Age: 48
End: 2023-07-17

## 2023-07-18 ENCOUNTER — TELEPHONE (OUTPATIENT)
Dept: FAMILY MEDICINE CLINIC | Facility: CLINIC | Age: 48
End: 2023-07-18

## 2023-07-18 NOTE — TELEPHONE ENCOUNTER
Regarding: FW: Blood pressure reading  Contact: [phone]    ----- Message -----  From: Jordyn Albarado DO  Sent: 7/18/2023  10:12 AM EDT  To: [facility]  Subject: Blood pressure reading                           ----- Message from Jordyn Albarado DO sent at 7/18/2023 10:12 AM EDT -----       ----- Message from Yaya Barrera to Jordyn Albarado DO sent at 7/17/2023  9:21 PM -----   Dr. Leanna Quintero just giving you an update that my BP has been stable. Most recent reading 118/72. I know my annual is scheduled for August, but the [facility] wants 3 visits documenting a normal BP reading and they want it before my annual that is schedule. Can we schedule a BP follow-up for sometime this week so I can have a 3rd normal reading documented? Have a great night!   Radha

## 2023-07-20 ENCOUNTER — CLINICAL SUPPORT (OUTPATIENT)
Dept: FAMILY MEDICINE CLINIC | Facility: CLINIC | Age: 48
End: 2023-07-20

## 2023-07-20 VITALS — HEART RATE: 86 BPM | OXYGEN SATURATION: 98 % | SYSTOLIC BLOOD PRESSURE: 124 MMHG | DIASTOLIC BLOOD PRESSURE: 70 MMHG

## 2023-07-20 DIAGNOSIS — I10 BENIGN ESSENTIAL HYPERTENSION: Primary | ICD-10-CM

## 2023-07-20 NOTE — LETTER
Dear Surgeon Sunita Zazueta,    Patient's last recorded blood pressures are as follows:     05/26/2023: 126/79  06/13/2023: 108/78  07/20/2023: 124/70     Please contact the office if you have any further questions or concerns.         Zayra Bagley, DO

## 2023-07-21 DIAGNOSIS — Z12.31 ENCOUNTER FOR SCREENING MAMMOGRAM FOR MALIGNANT NEOPLASM OF BREAST: Primary | ICD-10-CM

## 2023-07-28 ENCOUNTER — OFFICE VISIT (OUTPATIENT)
Dept: ENDOCRINOLOGY | Facility: CLINIC | Age: 48
End: 2023-07-28
Payer: COMMERCIAL

## 2023-07-28 VITALS
HEART RATE: 71 BPM | WEIGHT: 145 LBS | OXYGEN SATURATION: 99 % | BODY MASS INDEX: 27.38 KG/M2 | SYSTOLIC BLOOD PRESSURE: 138 MMHG | DIASTOLIC BLOOD PRESSURE: 78 MMHG | HEIGHT: 61 IN

## 2023-07-28 DIAGNOSIS — E04.2 MULTIPLE THYROID NODULES: Primary | ICD-10-CM

## 2023-07-28 DIAGNOSIS — R79.89 LOW VITAMIN D LEVEL: ICD-10-CM

## 2023-07-28 DIAGNOSIS — I10 BENIGN ESSENTIAL HYPERTENSION: ICD-10-CM

## 2023-07-28 PROCEDURE — 99204 OFFICE O/P NEW MOD 45 MIN: CPT | Performed by: INTERNAL MEDICINE

## 2023-07-28 NOTE — PROGRESS NOTES
[facility] 53 y.o. female MRN: [id number]    Encounter: [id number]      Assessment/Plan     Assessment: This is a 53y.o.-year-old female with PMH of thyroid nodules, sHTN, vitamin D deficiency who is seen in consultation for thyroid nodules    Plan:  Multiple Thyroid nodules  -The thyroid function has been stable. Thyroid nodules have been benign since 2014 and they have not increased in size and have been stable since then.  -The prognosis for thyroid nodules is good because the nodules have been stable for last 9 years  -Given TI-RADS of 4 for left upper and left lower thyroid nodule, we recommend obtaining ultrasound of thyroid in 2 years in June 2025. Orders placed    CC:   Thyroid nodule    History of Present Illness     HPI:  This is a 53y.o.-year-old female with PMH of thyroid nodules, sHTN, vitamin D deficiency who is seen in consultation for thyroid nodules    She was diagnosed with thyroid nodules in 2014 by her PCP and then has had subsequent US thyroids which has showed no increase in size of thyroid nodules or characteristics. TSH has always been within normal limits. TPO and thyroglobulin Ab  were checked in 2015 and was wnl    She is getting re-commissioned to join the army     She has been making intentional dietary changes and has lost 10 lbs in last 1 yrs. Denies any changes with skin, hair, nail, energy, temperature changes. No compressive sx such as dysphagia, voice hoarseness, SOB. No palpitations, anxiety or tremors    Denies of any radiation to head/neck/chest area. Mother has hashimoto's thyroitis. No hx of thyroid cancer cancer in the family    Review of Systems   Constitutional: Negative for activity change, appetite change and fatigue. HENT: Negative for congestion and sore throat. Eyes: Negative for redness and visual disturbance. Respiratory: Negative for cough and shortness of breath. Cardiovascular: Negative for palpitations and leg swelling.    Gastrointestinal: Negative for abdominal pain, constipation, diarrhea, nausea and vomiting. Endocrine: Negative for cold intolerance, heat intolerance, polydipsia, polyphagia and polyuria. Genitourinary: Negative for difficulty urinating and dysuria. Musculoskeletal: Negative for gait problem and neck pain. Skin: Negative for color change. Neurological: Negative for dizziness and syncope. Psychiatric/Behavioral: Negative for agitation and behavioral problems. All other systems reviewed and are negative. Historical Information   Past Medical History:   Diagnosis Date   • Disease of thyroid gland     2014 nodules on US   • Dizziness    • Hypertension    • Infertility, female    • Multiple thyroid nodules    • Obesity     Resolved 2016   • Palpitations    • Palpitations     Resolved 2016   • PONV (postoperative nausea and vomiting)    • Thyroid nodule     Last Assessed: 2014    • Thyroid nodule 2019   • Vertigo      Past Surgical History:   Procedure Laterality Date   • BREAST BIOPSY Right     Percutaneous Needle Core Right , age 33   •  SECTION     • CHOLECYSTECTOMY      Laparoscopic    • COLPORRHAPHY N/A 2020    Procedure: POST.  COLPORRHAPHY;  Surgeon: Veronique Palma MD;  Location: [facility];  Service: UroGynecology          • ENDOMETRIAL ABLATION     • LASIK  10/2020   • HI EXCISION EXCESSIVE SKIN & SUBQ TISSUE ABDOMEN N/A 12/10/2021    Procedure: ABDOMINOPLASTY;  Surgeon: Christopher Salazar MD;  Location: [address] [place] [address] MAIN OR;  Service: Plastics   • TUBAL LIGATION     • WISDOM TOOTH EXTRACTION       Social History   Social History     Substance and Sexual Activity   Alcohol Use Yes    Comment: Rarely     Social History     Substance and Sexual Activity   Drug Use No     Social History     Tobacco Use   Smoking Status Never   Smokeless Tobacco Never     Family History:   Family History   Problem Relation Age of Onset   • Hypertension Mother    • Heart disease Mother         a.fib, pacemaker, ablations, rheumatic heart disease, MVP   • Nephrolithiasis Mother    • Diabetes Mother    • Hyperthyroidism Mother    • Prostate cancer Father 72   • No Known Problems Sister    • No Known Problems Sister    • No Known Problems Sister    • No Known Problems Brother    • No Known Problems Brother    • No Known Problems Son    • No Known Problems Daughter    • No Known Problems Daughter    • Hypertension Maternal Grandmother    • Diabetes Maternal Grandmother    • Diabetes type II Maternal Grandmother    • Other Maternal Grandfather    • Stroke Maternal Grandfather         Stroke Syndrome    • Prostate cancer Maternal Grandfather 61   • Heart disease Maternal Grandfather 79        hx CABG   • Cancer Maternal Grandfather         Prostate   • Coronary aneurysm Maternal Grandfather         MI at a young age    • Diabetes Maternal Grandfather    • Diabetes Paternal Grandmother    • Cancer Paternal Grandmother 79        Vulvar    • Stroke Paternal Grandmother 80   • Hypertension Paternal Grandmother    • Lymphoma Maternal Uncle 36   • Lymphoma Paternal Aunt 48        non hodgkins follicular lymphoma   • Hypothyroidism Paternal Aunt    • Breast cancer Cousin 52        bilateral , mets to abd/pelvis. Genetic testing negative   • Hyperthyroidism Family    • Hypothyroidism Family        Meds/Allergies   Current Outpatient Medications   Medication Sig Dispense Refill   • Ascorbic Acid (vitamin C) 100 MG tablet Take 100 mg by mouth daily     • cyanocobalamin (VITAMIN B-12) 250 MCG tablet Take 250 mcg by mouth daily     • ergocalciferol (VITAMIN D2) 50,000 units Take 1 capsule (50,000 Units total) by mouth once a week Long term.  12 capsule 2   • folic acid (FOLVITE) 1 mg tablet Take 400 mcg by mouth daily     • lisinopril (ZESTRIL) 20 mg tablet Take 1 tablet (20 mg total) by mouth daily 90 tablet 3   • Omega-3 Fatty Acids (fish oil) 1,000 mg Take 1,000 mg by mouth daily     • spironolactone (ALDACTONE) 25 mg tablet Take 1 tablet (25 mg total) by mouth daily 90 tablet 3   • VITAMIN A PO Take by mouth     • VITAMIN K PO Take 600 mcg by mouth in the morning       No current facility-administered medications for this visit. No Known Allergies    Objective   Vitals: Blood pressure 138/78, pulse 71, height 5' 0.5" (1.537 m), weight 65.8 kg (145 lb), SpO2 99 %. Physical Exam  Constitutional:       Appearance: Normal appearance. HENT:      Head: Normocephalic and atraumatic. Cardiovascular:      Rate and Rhythm: Normal rate and regular rhythm. Pulses: Normal pulses. Heart sounds: Normal heart sounds. No murmur heard. No gallop. Pulmonary:      Effort: Pulmonary effort is normal.      Breath sounds: Normal breath sounds. No wheezing or rales. Abdominal:      General: Bowel sounds are normal.      Palpations: Abdomen is soft. Tenderness: There is no abdominal tenderness. Musculoskeletal:         General: No swelling. Cervical back: Normal range of motion and neck supple. No tenderness. Right lower leg: No edema. Left lower leg: No edema. Lymphadenopathy:      Cervical: No cervical adenopathy. Skin:     General: Skin is warm. Neurological:      Mental Status: She is alert and oriented to person, place, and time. Mental status is at baseline. Psychiatric:         Mood and Affect: Mood normal.         Behavior: Behavior normal.         The history was obtained from the review of the chart, patient. Lab Results:   Lab Results   Component Value Date/Time    TSH 3RD GENERATON 0.688 05/19/2023 07:53 AM    TSH 3RD GENERATON 1.540 08/12/2022 07:11 AM       Imaging Studies:   Results for orders placed during the hospital encounter of 06/12/23    US thyroid    Impression  Stable thyroid nodules, the largest measuring up to 1.0 cm in the left upper lobe compared to 2014 and representing benign etiology. Reference: ACR Thyroid Imaging, Reporting and Data System (TI-RADS):  White Paper of the ACR TI-RADS Committee. J AM Katja Radiol 8942;05:559-079. (additional recommendations based on American Thyroid Association 2015 guidelines.)      Workstation performed: [id number]      I have personally reviewed pertinent reports. Portions of the record may have been created with voice recognition software. Occasional wrong word or "sound a like" substitutions may have occurred due to the inherent limitations of voice recognition software. Read the chart carefully and recognize, using context, where substitutions have occurred.

## 2023-08-02 ENCOUNTER — CLINICAL SUPPORT (OUTPATIENT)
Dept: FAMILY MEDICINE CLINIC | Facility: CLINIC | Age: 48
End: 2023-08-02

## 2023-08-02 VITALS — HEART RATE: 61 BPM | DIASTOLIC BLOOD PRESSURE: 82 MMHG | SYSTOLIC BLOOD PRESSURE: 124 MMHG | OXYGEN SATURATION: 100 %

## 2023-08-02 DIAGNOSIS — I10 BENIGN ESSENTIAL HYPERTENSION: Primary | ICD-10-CM

## 2023-08-03 ENCOUNTER — CLINICAL SUPPORT (OUTPATIENT)
Dept: FAMILY MEDICINE CLINIC | Facility: CLINIC | Age: 48
End: 2023-08-03

## 2023-08-03 VITALS — SYSTOLIC BLOOD PRESSURE: 118 MMHG | HEART RATE: 61 BPM | DIASTOLIC BLOOD PRESSURE: 74 MMHG

## 2023-08-03 DIAGNOSIS — I10 BENIGN ESSENTIAL HYPERTENSION: Primary | ICD-10-CM

## 2023-08-04 ENCOUNTER — CLINICAL SUPPORT (OUTPATIENT)
Dept: FAMILY MEDICINE CLINIC | Facility: CLINIC | Age: 48
End: 2023-08-04

## 2023-08-04 VITALS — DIASTOLIC BLOOD PRESSURE: 80 MMHG | HEART RATE: 71 BPM | SYSTOLIC BLOOD PRESSURE: 122 MMHG

## 2023-08-04 DIAGNOSIS — I10 BENIGN ESSENTIAL HYPERTENSION: Primary | ICD-10-CM

## 2023-08-18 ENCOUNTER — OFFICE VISIT (OUTPATIENT)
Dept: FAMILY MEDICINE CLINIC | Facility: CLINIC | Age: 48
End: 2023-08-18
Payer: COMMERCIAL

## 2023-08-18 VITALS
DIASTOLIC BLOOD PRESSURE: 68 MMHG | RESPIRATION RATE: 16 BRPM | TEMPERATURE: 97.9 F | WEIGHT: 145 LBS | OXYGEN SATURATION: 100 % | HEART RATE: 62 BPM | HEIGHT: 61 IN | BODY MASS INDEX: 27.38 KG/M2 | SYSTOLIC BLOOD PRESSURE: 118 MMHG

## 2023-08-18 DIAGNOSIS — Z29.9 PREVENTIVE MEASURE: ICD-10-CM

## 2023-08-18 DIAGNOSIS — Z00.00 WELL ADULT EXAM: Primary | ICD-10-CM

## 2023-08-18 DIAGNOSIS — I10 BENIGN ESSENTIAL HYPERTENSION: ICD-10-CM

## 2023-08-18 PROCEDURE — 99396 PREV VISIT EST AGE 40-64: CPT | Performed by: FAMILY MEDICINE

## 2023-08-18 RX ORDER — ETONOGESTREL AND ETHINYL ESTRADIOL 11.7; 2.7 MG/1; MG/1
INSERT, EXTENDED RELEASE VAGINAL
Start: 2023-08-18

## 2023-08-18 NOTE — PROGRESS NOTES
Assessment/Plan:     1. Well adult exam    2. Benign essential hypertension  Comments:  well controlled on current medication    3. Preventive measure  Comments:  on Nuva Ring. helps hot flashes that started last year with irregular menses. Orders:  -     etonogestrel-ethinyl estradiol (NuvaRing) 0.12-0.015 MG/24HR vaginal ring; Change every 3 weeks. Well adult exam  ·         Continue healthy diet   ·         Encourage exercise 4 times a week or more for minimum 30 minutes. ·         Continue to see dentist, wear seatbelt. ·         Health maintenance reviewed - mammogram scheduled. Reviewed age appropriate health maintenance screenings and immunizations that are due, risks and benefits of these. Health Maintenance   Topic Date Due   • COVID-19 Vaccine (2 - Booster for Missael series) 10/04/2021   • OT PLAN OF CARE  10/29/2021   • Influenza Vaccine (1) 09/01/2023   • Breast Cancer Screening: Mammogram  10/07/2023   • BMI: Followup Plan  05/16/2024   • Depression Screening  06/13/2024   • BMI: Adult  08/18/2024   • Annual Physical  08/18/2024   • Cervical Cancer Screening  05/28/2026   • DTaP,Tdap,and Td Vaccines (3 - Td or Tdap) 02/17/2027   • Colorectal Cancer Screening  11/11/2032   • HIV Screening  Completed   • Hepatitis C Screening  Completed   • Pneumococcal Vaccine: Pediatrics (0 to 5 Years) and At-Risk Patients (6 to 59 Years)  Aged Out   • HIB Vaccine  Aged Out   • IPV Vaccine  Aged Out   • Hepatitis A Vaccine  Aged Out   • Meningococcal ACWY Vaccine  Aged Out   • HPV Vaccine  Aged Out     No follow-ups on file. Subjective:    KORIN Garcia is a 53 y.o. female who presents today for a physical.     Chief Complaint   Patient presents with   • Physical Exam     Annual. No new problems or concerns at this time. Patient Care Team:  Omar Wolff, DO as PCP - General (Family Medicine)    PHQ-2/9 Depression Screening          ?    ---Above per clinical staff & reviewed. ---  Patient here today for a physical:    Diet: healthy   Exercise:  Yes   Dental visits:  Yes   Seatbelt: yes   Sleeping well     Concerns today:  Doing well             The following portions of the patient's history were reviewed and updated as appropriate: allergies, current medications, past family history, past medical history, past social history, past surgical history and problem list.     Current Medications:  Current Outpatient Medications   Medication Sig Dispense Refill   • Ascorbic Acid (vitamin C) 100 MG tablet Take 100 mg by mouth daily     • cyanocobalamin (VITAMIN B-12) 250 MCG tablet Take 250 mcg by mouth daily     • ergocalciferol (VITAMIN D2) 50,000 units Take 1 capsule (50,000 Units total) by mouth once a week Long term. 12 capsule 2   • etonogestrel-ethinyl estradiol (NuvaRing) 0.12-0.015 MG/24HR vaginal ring Change every 3 weeks. • folic acid (FOLVITE) 1 mg tablet Take 400 mcg by mouth daily     • lisinopril (ZESTRIL) 20 mg tablet Take 1 tablet (20 mg total) by mouth daily 90 tablet 3   • Omega-3 Fatty Acids (fish oil) 1,000 mg Take 1,000 mg by mouth daily     • spironolactone (ALDACTONE) 25 mg tablet Take 1 tablet (25 mg total) by mouth daily 90 tablet 3   • VITAMIN K PO Take 600 mcg by mouth in the morning       No current facility-administered medications for this visit. Objective:      /68   Pulse 62   Temp 97.9 °F (36.6 °C)   Resp 16   Ht 5' 0.5" (1.537 m)   Wt 65.8 kg (145 lb)   SpO2 100%   BMI 27.85 kg/m²   BP Readings from Last 3 Encounters:   08/18/23 118/68   08/04/23 122/80   08/03/23 118/74     Wt Readings from Last 3 Encounters:   08/18/23 65.8 kg (145 lb)   07/28/23 65.8 kg (145 lb)   06/13/23 65.9 kg (145 lb 3.2 oz)       Review of Systems  ROS:  all others negative - no chest pain, SOB, normal urine and bowels. no GERD. sleeping well. mood good. Physical Exam   Constitutional: she appears well-developed and well-nourished.    HENT: Head: Normocephalic. Right Ear: External ear normal. Tympanic membrane normal.   Left Ear: External ear normal. Tympanic membrane normal.   Nose: Nose normal. No mucosal edema, No rhinorrhea. Right sinus exhibits no maxillary sinus tenderness. Left sinus exhibits no maxillary sinus tenderness. Mouth/Throat: Oropharynx is clear and moist.   Eyes: Normal conjunctiva. No erythema. No discharge. Neck: No pain on exam. Neck supple. Cardiovascular: Normal rate, regular rhythm and normal heart sounds. Pulmonary/Chest: Effort normal and breath sounds normal. No wheezes. No rales. No rhonchi. Abdominal: Soft. Bowel sounds are normal. There is no tenderness. Musculoskeletal: she exhibits no edema. Lymphadenopathy: she has no cervical adenopathy. Neurological: she  is alert and oriented to person, place, and time. Skin: Skin is warm and dry. No rashes. Psychiatric: she  has a normal mood and affect. her behavior is normal. Thought content normal.   Vitals reviewed.

## 2023-08-30 DIAGNOSIS — Z29.9 PREVENTIVE MEASURE: ICD-10-CM

## 2023-08-30 RX ORDER — ETONOGESTREL AND ETHINYL ESTRADIOL 11.7; 2.7 MG/1; MG/1
INSERT, EXTENDED RELEASE VAGINAL
Qty: 4 EACH | Refills: 3 | Status: SHIPPED | OUTPATIENT
Start: 2023-08-30

## 2023-12-06 ENCOUNTER — HOSPITAL ENCOUNTER (OUTPATIENT)
Dept: MAMMOGRAPHY | Facility: HOSPITAL | Age: 48
Discharge: HOME | End: 2023-12-06
Payer: COMMERCIAL

## 2023-12-06 VITALS — HEIGHT: 61 IN | BODY MASS INDEX: 27.39 KG/M2 | WEIGHT: 145.06 LBS

## 2023-12-06 PROCEDURE — 77067 SCR MAMMO BI INCL CAD: CPT

## 2023-12-06 PROCEDURE — 77063 BREAST TOMOSYNTHESIS BI: CPT

## 2024-01-12 ENCOUNTER — HOSPITAL ENCOUNTER (OUTPATIENT)
Dept: ULTRASOUND IMAGING | Facility: CLINIC | Age: 49
Discharge: HOME | End: 2024-01-12
Payer: COMMERCIAL

## 2024-01-12 ENCOUNTER — HOSPITAL ENCOUNTER (OUTPATIENT)
Dept: MAMMOGRAPHY | Facility: CLINIC | Age: 49
Discharge: HOME | End: 2024-01-12
Payer: COMMERCIAL

## 2024-01-12 DIAGNOSIS — R92.8 ABNORMAL MAMMOGRAM: ICD-10-CM

## 2024-01-12 PROCEDURE — 77065 DX MAMMO INCL CAD UNI: CPT

## 2024-01-12 PROCEDURE — G0279 TOMOSYNTHESIS, MAMMO: HCPCS

## 2024-01-12 PROCEDURE — 76642 ULTRASOUND BREAST LIMITED: CPT

## 2024-03-08 ENCOUNTER — ANNUAL EXAM (OUTPATIENT)
Dept: OBGYN CLINIC | Facility: CLINIC | Age: 49
End: 2024-03-08
Payer: COMMERCIAL

## 2024-03-08 VITALS
DIASTOLIC BLOOD PRESSURE: 72 MMHG | HEIGHT: 60 IN | SYSTOLIC BLOOD PRESSURE: 110 MMHG | WEIGHT: 154 LBS | BODY MASS INDEX: 30.23 KG/M2

## 2024-03-08 DIAGNOSIS — Z01.419 ENCOUNTER FOR GYNECOLOGICAL EXAMINATION WITHOUT ABNORMAL FINDING: Primary | ICD-10-CM

## 2024-03-08 DIAGNOSIS — N95.2 ATROPHIC VAGINITIS: ICD-10-CM

## 2024-03-08 DIAGNOSIS — Z29.9 PREVENTIVE MEASURE: ICD-10-CM

## 2024-03-08 PROCEDURE — S0612 ANNUAL GYNECOLOGICAL EXAMINA: HCPCS | Performed by: OBSTETRICS & GYNECOLOGY

## 2024-03-08 PROCEDURE — G0145 SCR C/V CYTO,THINLAYER,RESCR: HCPCS | Performed by: OBSTETRICS & GYNECOLOGY

## 2024-03-08 PROCEDURE — G0476 HPV COMBO ASSAY CA SCREEN: HCPCS | Performed by: OBSTETRICS & GYNECOLOGY

## 2024-03-08 RX ORDER — ETONOGESTREL AND ETHINYL ESTRADIOL VAGINAL RING .015; .12 MG/D; MG/D
RING VAGINAL
Qty: 4 EACH | Refills: 3 | Status: SHIPPED | OUTPATIENT
Start: 2024-03-08

## 2024-03-08 RX ORDER — ESTRADIOL 0.1 MG/G
1 CREAM VAGINAL 3 TIMES WEEKLY
Qty: 42.5 G | Refills: 2 | Status: SHIPPED | OUTPATIENT
Start: 2024-03-08

## 2024-03-08 NOTE — PROGRESS NOTES
Subjective      Radha Gonzalez is a 49 y.o. G6, P3 female who presents for annual well woman exam. Periods are light spotting monthly with continue use of NuvaRing.  No intermenstrual bleeding, spotting, or discharge.  Patient reports No hot flashes/night sweats and using NuvaRing continuously otherwise has very significant symptoms, No pain problems intercourse does report somewhat decreased libido, Yes  vaginal dryness, sleeping interrupted often wakes sometimes have trouble getting back to sleep.  Using lubrication for intercourse generally beneficial however patient has daily vaginal discomfort and irritation from dryness despite using NuvaRing has been trying to use coconut oil without resolution of symptoms counseled on using some Estrace cream to improve symptoms discussed and reviewed.  Also discussed Avlamil as an option for decreased libido and briefly reviewed testosterone cream.    Current contraception: tubal ligation  History of abnormal Pap smear: no  Family history of uterine or ovarian cancer: no  Regular self breast exam: yes  History of abnormal mammogram: yes -appropriate on follow-up  Family history of breast cancer: yes -paternal cousin age 47, genetic testing negative    Menstrual History:  OB History          11    Para   5    Term   3       2    AB   6    Living   3         SAB   6    IAB        Ectopic        Multiple        Live Births   3           Obstetric Comments   Menarche at age 9. First pregnancy age 23. Hx of BCP and Depoprovera use.               Menarche age: 9  Patient's last menstrual period was 02/10/2024 (exact date).       The following portions of the patient's history were reviewed and updated as appropriate: allergies, current medications, past family history, past medical history, past social history, past surgical history, and problem list.  Past Medical History:   Diagnosis Date    Disease of thyroid gland      nodules on US    Dizziness      Hypertension     Infertility, female     Miscarriage     , ,     Multiple thyroid nodules     Obesity     Resolved 2016    Palpitations     Palpitations     Resolved 2016    PONV (postoperative nausea and vomiting)     Thyroid nodule     Last Assessed: 2014     Thyroid nodule 2019    Vertigo      Past Surgical History:   Procedure Laterality Date    BREAST BIOPSY Right 2001    Percutaneous Needle Core Right , age 27    BREAST LUMPECTOMY  2002    Right     SECTION      CHOLECYSTECTOMY      Laparoscopic     COLPORRHAPHY N/A 2020    Procedure: POST. COLPORRHAPHY;  Surgeon: Buster Santamaria MD;  Location: [facility];  Service: UroGynecology           ENDOMETRIAL ABLATION  2012    LASIK  10/2020    NC EXCISION EXCESSIVE SKIN & SUBQ TISSUE ABDOMEN N/A 12/10/2021    Procedure: ABDOMINOPLASTY;  Surgeon: Isaias Ogden MD;  Location:  MAIN OR;  Service: Plastics    TUBAL LIGATION      WISDOM TOOTH EXTRACTION       OB History          11    Para   5    Term   3       2    AB   6    Living   3         SAB   6    IAB        Ectopic        Multiple        Live Births   3           Obstetric Comments   Menarche at age 9. First pregnancy age 23. Hx of BCP and Depoprovera use.                Review of Systems  Review of Systems   Constitutional:  Negative for chills and fever.   HENT:  Negative for ear pain and sore throat.    Eyes:  Negative for pain and visual disturbance.   Respiratory:  Negative for cough and shortness of breath.    Cardiovascular:  Negative for chest pain and palpitations.   Gastrointestinal:  Negative for abdominal pain and vomiting.   Genitourinary:  Negative for dysuria and hematuria.   Musculoskeletal:  Negative for arthralgias and back pain.   Skin:  Negative for color change and rash.   Neurological:  Negative for seizures and syncope.   All other systems reviewed and are negative.     Objective      /72 (BP Location: Right arm,  Patient Position: Sitting, Cuff Size: Standard)   Ht 5' (1.524 m)   Wt 69.9 kg (154 lb)   LMP 02/10/2024 (Exact Date)   BMI 30.08 kg/m²   Vitals:    03/08/24 1248   BP: 110/72   BP Location: Right arm   Patient Position: Sitting   Cuff Size: Standard   Weight: 69.9 kg (154 lb)   Height: 5' (1.524 m)     General:   alert and oriented, in no acute distress   Heart: regular rate and rhythm, S1, S2 normal, no murmur, click, rub or gallop   Lungs: clear to auscultation bilaterally   Abdomen: soft, non-tender, without masses or organomegaly   Vulva: normal   Vagina: normal mucosa, atrophic findings no significant visible evidence of posterior colporrhaphy patient reports is working well   Cervix: no bleeding following Pap, no cervical motion tenderness, and no lesions   Uterus: normal size, mobile, non-tender   Adnexa: normal adnexa and no mass, fullness, tenderness   Breast inspection negative, no nipple discharge or bleeding, no masses or nodularity palpable  Rectal deferred, patient declined had normal colonoscopy November 2022  Thyroid normal no masses or nodules     Assessment   49-year-old G6, P3 here for annual exam using NuvaRing to control menopausal symptoms still with some vaginal dryness.  Counseled on Estrace cream.  Last Pap May 2021 normal, last mammogram required diagnostic and ultrasound follow-up done January 2024 has 6-month follow-up scheduled, last colonoscopy November 2022 good for 10 years     Plan   ThinPrep with cotesting performed, already has orders for mammograms discussed dense breast option of adding ABUS when done with diagnostic ultrasounds return in 1 year or sooner as needed.  Renewed NuvaRing and ordered Estrace cream.

## 2024-03-15 LAB
LAB AP GYN PRIMARY INTERPRETATION: NORMAL
LAB AP LMP: NORMAL
Lab: NORMAL
PATH INTERP SPEC-IMP: NORMAL

## 2024-05-31 DIAGNOSIS — E87.6 HYPOKALEMIA: ICD-10-CM

## 2024-05-31 DIAGNOSIS — I10 BENIGN ESSENTIAL HYPERTENSION: ICD-10-CM

## 2024-05-31 RX ORDER — LISINOPRIL 20 MG/1
20 TABLET ORAL DAILY
Qty: 90 TABLET | Refills: 0 | Status: SHIPPED | OUTPATIENT
Start: 2024-05-31

## 2024-05-31 RX ORDER — SPIRONOLACTONE 25 MG/1
25 TABLET ORAL DAILY
Qty: 90 TABLET | Refills: 0 | Status: SHIPPED | OUTPATIENT
Start: 2024-05-31

## 2024-07-03 ENCOUNTER — DOCUMENTATION (OUTPATIENT)
Dept: OBGYN CLINIC | Facility: CLINIC | Age: 49
End: 2024-07-03

## 2024-07-03 DIAGNOSIS — Z29.9 PREVENTIVE MEASURE: Primary | ICD-10-CM

## 2024-07-03 RX ORDER — ETONOGESTREL AND ETHINYL ESTRADIOL VAGINAL RING .015; .12 MG/D; MG/D
RING VAGINAL
Qty: 4 EACH | Refills: 3 | Status: SHIPPED | OUTPATIENT
Start: 2024-07-03

## 2024-07-19 ENCOUNTER — HOSPITAL ENCOUNTER (OUTPATIENT)
Dept: MAMMOGRAPHY | Facility: CLINIC | Age: 49
Discharge: HOME | End: 2024-07-19
Payer: COMMERCIAL

## 2024-07-19 DIAGNOSIS — R92.8 ABNORMAL FINDINGS ON DIAGNOSTIC IMAGING OF BREAST: ICD-10-CM

## 2024-07-19 PROCEDURE — G0279 TOMOSYNTHESIS, MAMMO: HCPCS

## 2024-07-19 PROCEDURE — 77065 DX MAMMO INCL CAD UNI: CPT

## 2024-08-23 ENCOUNTER — OFFICE VISIT (OUTPATIENT)
Dept: FAMILY MEDICINE CLINIC | Facility: CLINIC | Age: 49
End: 2024-08-23
Payer: COMMERCIAL

## 2024-08-23 VITALS
WEIGHT: 158 LBS | TEMPERATURE: 98.7 F | HEIGHT: 60 IN | SYSTOLIC BLOOD PRESSURE: 112 MMHG | HEART RATE: 69 BPM | DIASTOLIC BLOOD PRESSURE: 74 MMHG | OXYGEN SATURATION: 100 % | BODY MASS INDEX: 31.02 KG/M2 | RESPIRATION RATE: 16 BRPM

## 2024-08-23 DIAGNOSIS — R92.30 DENSE BREAST TISSUE: ICD-10-CM

## 2024-08-23 DIAGNOSIS — E66.09 CLASS 1 OBESITY DUE TO EXCESS CALORIES WITH SERIOUS COMORBIDITY AND BODY MASS INDEX (BMI) OF 30.0 TO 30.9 IN ADULT: ICD-10-CM

## 2024-08-23 DIAGNOSIS — Z80.3 FAMILY HISTORY OF BREAST CANCER: ICD-10-CM

## 2024-08-23 DIAGNOSIS — Z00.00 WELL ADULT EXAM: Primary | ICD-10-CM

## 2024-08-23 DIAGNOSIS — E55.9 VITAMIN D DEFICIENCY: ICD-10-CM

## 2024-08-23 DIAGNOSIS — E04.2 MULTIPLE THYROID NODULES: ICD-10-CM

## 2024-08-23 DIAGNOSIS — I10 BENIGN ESSENTIAL HYPERTENSION: ICD-10-CM

## 2024-08-23 PROCEDURE — 99396 PREV VISIT EST AGE 40-64: CPT | Performed by: FAMILY MEDICINE

## 2024-08-23 NOTE — ASSESSMENT & PLAN NOTE
Well controlled   Orders:    Comprehensive metabolic panel; Future    Hemoglobin A1C; Future    Lipid panel; Future    Vitamin D 25 hydroxy; Future    TSH, 3rd generation; Future    Cortisol; Future

## 2024-08-23 NOTE — ASSESSMENT & PLAN NOTE
Trouble losing weight. Eating well and exercising. Pt requests cortisol level.   Orders:    Comprehensive metabolic panel; Future    Hemoglobin A1C; Future    Lipid panel; Future    Vitamin D 25 hydroxy; Future    TSH, 3rd generation; Future    Cortisol; Future

## 2024-08-23 NOTE — PROGRESS NOTES
Assessment/Plan:     Assessment & Plan  Well adult exam  See below        Benign essential hypertension  Well controlled   Orders:    Comprehensive metabolic panel; Future    Hemoglobin A1C; Future    Lipid panel; Future    Vitamin D 25 hydroxy; Future    TSH, 3rd generation; Future    Cortisol; Future    Vitamin D deficiency  Update blood work   Orders:    Vitamin D 25 hydroxy; Future    Class 1 obesity due to excess calories with serious comorbidity and body mass index (BMI) of 30.0 to 30.9 in adult  Trouble losing weight. Eating well and exercising. Pt requests cortisol level.   Orders:    Comprehensive metabolic panel; Future    Hemoglobin A1C; Future    Lipid panel; Future    Vitamin D 25 hydroxy; Future    TSH, 3rd generation; Future    Cortisol; Future    Dense breast tissue  Curently on Q 6 month diagnostic mammogram. Then will alternate ABUS with mammogram.        Family history of breast cancer  See above.        Multiple thyroid nodules  Update tsh.   Orders:    TSH, 3rd generation; Future         Well adult exam  ·         Continue healthy diet   ·         Encourage exercise 4 times a week or more for minimum 30 minutes.   ·         Continue to see dentist, wear seatbelt.  ·         Health maintenance reviewed and up-to-date  Reviewed age appropriate health maintenance screenings and immunizations that are due, risks and benefits of these.   Health Maintenance   Topic Date Due    OT PLAN OF CARE  10/29/2021    COVID-19 Vaccine (2 - 2023-24 season) 09/01/2023    Annual Physical  08/18/2024    Influenza Vaccine (1) 09/01/2024    Breast Cancer Screening: Mammogram  12/06/2024    Depression Screening  08/23/2025    Zoster Vaccine (1 of 2) 02/08/2026    DTaP,Tdap,and Td Vaccines (3 - Td or Tdap) 02/17/2027    Cervical Cancer Screening  03/08/2029    Colorectal Cancer Screening  11/11/2032    RSV Vaccine Age 60+ Years (1 - 1-dose 60+ series) 02/08/2036    HIV Screening  Completed    Hepatitis C Screening   Completed    RSV Vaccine age 0-20 Months  Aged Out    Pneumococcal Vaccine: Pediatrics (0 to 5 Years) and At-Risk Patients (6 to 64 Years)  Aged Out    HIB Vaccine  Aged Out    IPV Vaccine  Aged Out    Hepatitis A Vaccine  Aged Out    Meningococcal ACWY Vaccine  Aged Out    HPV Vaccine  Aged Out     Return in about 1 year (around 8/23/2025) for Annual physical.    Subjective:    KORIN Garcia is a 49 y.o. female who presents today for a physical.     Chief Complaint   Patient presents with    Physical Exam     Physical - no concerns for PCP at this time.          Patient Care Team:  Migdalia Viera DO as PCP - General (Family Medicine)    PHQ-2/9 Depression Screening    Little interest or pleasure in doing things: 0 - not at all  Feeling down, depressed, or hopeless: 0 - not at all  PHQ-2 Score: 0  PHQ-2 Interpretation: Negative depression screen            ---Above per clinical staff & reviewed. ---  Patient here today for a physical:    Diet: healthy, carbs, and   Exercise:  3 -4 times a week   Dental visits:  yes   Sleep: 6 - 8 hours     Concerns today:  [employer] now   No period - on nuva ring continuously           The following portions of the patient's history were reviewed and updated as appropriate: allergies, current medications, past family history, past medical history, past social history, past surgical history and problem list.     Current Medications:  Current Outpatient Medications   Medication Sig Dispense Refill    Ascorbic Acid (vitamin C) 100 MG tablet Take 100 mg by mouth daily      cyanocobalamin (VITAMIN B-12) 250 MCG tablet Take 250 mcg by mouth daily      ergocalciferol (VITAMIN D2) 50,000 units Take 1 capsule (50,000 Units total) by mouth once a week Long term. 12 capsule 2    estradiol (ESTRACE VAGINAL) 0.1 mg/g vaginal cream Insert 1 g into the vagina 3 (three) times a week (Patient taking differently: Insert 1 g into the vagina as needed) 42.5 g 2    etonogestrel-ethinyl  estradiol (NuvaRing) 0.12-0.015 MG/24HR vaginal ring Change every 3 weeks. 4 each 3    folic acid (FOLVITE) 1 mg tablet Take 400 mcg by mouth daily      lisinopril (ZESTRIL) 20 mg tablet TAKE ONE TABLET BY MOUTH DAILY 90 tablet 0    Omega-3 Fatty Acids (fish oil) 1,000 mg Take 1,000 mg by mouth daily      spironolactone (ALDACTONE) 25 mg tablet TAKE ONE TABLET BY MOUTH DAILY 90 tablet 0    VITAMIN K PO Take 600 mcg by mouth in the morning       No current facility-administered medications for this visit.        Objective:      /74 (BP Location: Left arm, Patient Position: Sitting, Cuff Size: Standard)   Pulse 69   Temp 98.7 °F (37.1 °C) (Temporal)   Resp 16   Ht 5' (1.524 m)   Wt 71.7 kg (158 lb)   SpO2 100%   BMI 30.86 kg/m²   BP Readings from Last 3 Encounters:   08/23/24 112/74   03/08/24 110/72   08/18/23 118/68     Wt Readings from Last 3 Encounters:   08/23/24 71.7 kg (158 lb)   03/08/24 69.9 kg (154 lb)   12/06/23 65.8 kg (145 lb 1 oz)       Review of Systems  ROS:  all others negative - no chest pain, SOB, normal urine and bowels. no GERD. sleeping well. mood good.     Physical Exam   Constitutional: she appears well-developed and well-nourished.   HENT: Head: Normocephalic.   Right Ear: External ear normal. Tympanic membrane normal.   Left Ear: External ear normal. Tympanic membrane normal.   Nose: Nose normal. No mucosal edema, No rhinorrhea.   Right sinus exhibits no maxillary sinus tenderness.   Left sinus exhibits no maxillary sinus tenderness.   Mouth/Throat: Oropharynx is clear and moist.   Eyes: Normal conjunctiva.  No erythema. No discharge.  Neck: No pain on exam. Neck supple.   Cardiovascular: Normal rate, regular rhythm and normal heart sounds.    Pulmonary/Chest: Effort normal and breath sounds normal. No wheezes. No rales. No rhonchi.   Abdominal: Soft. Bowel sounds are normal. There is no tenderness.   Musculoskeletal: she exhibits no edema.   Lymphadenopathy: she has no cervical  adenopathy.   Neurological: she  is alert and oriented to person, place, and time.   Skin: Skin is warm and dry. No rashes.  Psychiatric: she  has a normal mood and affect. her behavior is normal. Thought content normal.   Vitals reviewed.

## 2024-08-28 ENCOUNTER — HOSPITAL ENCOUNTER (OUTPATIENT)
Facility: HOSPITAL | Age: 49
Setting detail: OBSERVATION
Discharge: HOME | End: 2024-08-30
Attending: EMERGENCY MEDICINE | Admitting: INTERNAL MEDICINE
Payer: COMMERCIAL

## 2024-08-28 ENCOUNTER — APPOINTMENT (EMERGENCY)
Dept: CT IMAGING | Facility: HOSPITAL | Age: 49
End: 2024-08-28
Payer: COMMERCIAL

## 2024-08-28 ENCOUNTER — APPOINTMENT (OUTPATIENT)
Dept: MRI IMAGING | Facility: HOSPITAL | Age: 49
End: 2024-08-28
Payer: COMMERCIAL

## 2024-08-28 DIAGNOSIS — R29.90 STROKE-LIKE SYMPTOMS: ICD-10-CM

## 2024-08-28 DIAGNOSIS — R79.89 ELEVATED TROPONIN: ICD-10-CM

## 2024-08-28 DIAGNOSIS — R51.9 ACUTE HEADACHE: ICD-10-CM

## 2024-08-28 DIAGNOSIS — E53.8 VITAMIN B12 DEFICIENCY: ICD-10-CM

## 2024-08-28 DIAGNOSIS — R20.0 LEFT FACIAL NUMBNESS: Primary | ICD-10-CM

## 2024-08-28 DIAGNOSIS — E78.5 HYPERLIPIDEMIA: ICD-10-CM

## 2024-08-28 LAB
2HR DELTA HS TROPONIN: -11 NG/L
4HR DELTA HS TROPONIN: -11 NG/L
ALBUMIN SERPL BCG-MCNC: 4.6 G/DL (ref 3.5–5)
ALP SERPL-CCNC: 53 U/L (ref 34–104)
ALT SERPL W P-5'-P-CCNC: 13 U/L (ref 7–52)
ANION GAP SERPL CALCULATED.3IONS-SCNC: 7 MMOL/L (ref 4–13)
APTT PPP: 33 SECONDS (ref 23–34)
AST SERPL W P-5'-P-CCNC: 17 U/L (ref 13–39)
ATRIAL RATE: 90 BPM
BASOPHILS # BLD AUTO: 0.05 THOUSANDS/ÂΜL (ref 0–0.1)
BASOPHILS NFR BLD AUTO: 1 % (ref 0–1)
BILIRUB SERPL-MCNC: 0.34 MG/DL (ref 0.2–1)
BUN SERPL-MCNC: 16 MG/DL (ref 5–25)
CALCIUM SERPL-MCNC: 9.9 MG/DL (ref 8.4–10.2)
CARDIAC TROPONIN I PNL SERPL HS: 20 NG/L
CARDIAC TROPONIN I PNL SERPL HS: 9 NG/L
CARDIAC TROPONIN I PNL SERPL HS: 9 NG/L
CHLORIDE SERPL-SCNC: 103 MMOL/L (ref 96–108)
CO2 SERPL-SCNC: 25 MMOL/L (ref 21–32)
CREAT SERPL-MCNC: 0.81 MG/DL (ref 0.6–1.3)
EOSINOPHIL # BLD AUTO: 0.11 THOUSAND/ÂΜL (ref 0–0.61)
EOSINOPHIL NFR BLD AUTO: 2 % (ref 0–6)
ERYTHROCYTE [DISTWIDTH] IN BLOOD BY AUTOMATED COUNT: 12.1 % (ref 11.6–15.1)
EST. AVERAGE GLUCOSE BLD GHB EST-MCNC: 108 MG/DL
EXT PREGNANCY TEST URINE: NEGATIVE
EXT. CONTROL: NORMAL
FLUAV RNA RESP QL NAA+PROBE: NEGATIVE
FLUBV RNA RESP QL NAA+PROBE: NEGATIVE
GFR SERPL CREATININE-BSD FRML MDRD: 86 ML/MIN/1.73SQ M
GLUCOSE SERPL-MCNC: 88 MG/DL (ref 65–140)
GLUCOSE SERPL-MCNC: 90 MG/DL (ref 65–140)
HBA1C MFR BLD: 5.4 %
HCT VFR BLD AUTO: 40.8 % (ref 34.8–46.1)
HGB BLD-MCNC: 13.8 G/DL (ref 11.5–15.4)
IMM GRANULOCYTES # BLD AUTO: 0.01 THOUSAND/UL (ref 0–0.2)
IMM GRANULOCYTES NFR BLD AUTO: 0 % (ref 0–2)
INR PPP: 0.89 (ref 0.85–1.19)
LYMPHOCYTES # BLD AUTO: 2.66 THOUSANDS/ÂΜL (ref 0.6–4.47)
LYMPHOCYTES NFR BLD AUTO: 39 % (ref 14–44)
MCH RBC QN AUTO: 29.9 PG (ref 26.8–34.3)
MCHC RBC AUTO-ENTMCNC: 33.8 G/DL (ref 31.4–37.4)
MCV RBC AUTO: 88 FL (ref 82–98)
MONOCYTES # BLD AUTO: 0.44 THOUSAND/ÂΜL (ref 0.17–1.22)
MONOCYTES NFR BLD AUTO: 6 % (ref 4–12)
NEUTROPHILS # BLD AUTO: 3.57 THOUSANDS/ÂΜL (ref 1.85–7.62)
NEUTS SEG NFR BLD AUTO: 52 % (ref 43–75)
NRBC BLD AUTO-RTO: 0 /100 WBCS
P AXIS: 81 DEGREES
PLATELET # BLD AUTO: 361 THOUSANDS/UL (ref 149–390)
PMV BLD AUTO: 9.1 FL (ref 8.9–12.7)
POTASSIUM SERPL-SCNC: 4 MMOL/L (ref 3.5–5.3)
PR INTERVAL: 154 MS
PROT SERPL-MCNC: 8.2 G/DL (ref 6.4–8.4)
PROTHROMBIN TIME: 12.7 SECONDS (ref 12.3–15)
QRS AXIS: 80 DEGREES
QRSD INTERVAL: 82 MS
QT INTERVAL: 358 MS
QTC INTERVAL: 437 MS
RBC # BLD AUTO: 4.62 MILLION/UL (ref 3.81–5.12)
RSV RNA RESP QL NAA+PROBE: NEGATIVE
SARS-COV-2 RNA RESP QL NAA+PROBE: NEGATIVE
SODIUM SERPL-SCNC: 135 MMOL/L (ref 135–147)
T WAVE AXIS: 72 DEGREES
VENTRICULAR RATE: 90 BPM
WBC # BLD AUTO: 6.84 THOUSAND/UL (ref 4.31–10.16)

## 2024-08-28 PROCEDURE — 70551 MRI BRAIN STEM W/O DYE: CPT

## 2024-08-28 PROCEDURE — 99285 EMERGENCY DEPT VISIT HI MDM: CPT | Performed by: EMERGENCY MEDICINE

## 2024-08-28 PROCEDURE — 85730 THROMBOPLASTIN TIME PARTIAL: CPT

## 2024-08-28 PROCEDURE — 81025 URINE PREGNANCY TEST: CPT | Performed by: EMERGENCY MEDICINE

## 2024-08-28 PROCEDURE — 70498 CT ANGIOGRAPHY NECK: CPT

## 2024-08-28 PROCEDURE — 36415 COLL VENOUS BLD VENIPUNCTURE: CPT

## 2024-08-28 PROCEDURE — 93005 ELECTROCARDIOGRAM TRACING: CPT

## 2024-08-28 PROCEDURE — 84484 ASSAY OF TROPONIN QUANT: CPT | Performed by: INTERNAL MEDICINE

## 2024-08-28 PROCEDURE — 99284 EMERGENCY DEPT VISIT MOD MDM: CPT

## 2024-08-28 PROCEDURE — 84484 ASSAY OF TROPONIN QUANT: CPT | Performed by: EMERGENCY MEDICINE

## 2024-08-28 PROCEDURE — 85610 PROTHROMBIN TIME: CPT

## 2024-08-28 PROCEDURE — 84443 ASSAY THYROID STIM HORMONE: CPT | Performed by: INTERNAL MEDICINE

## 2024-08-28 PROCEDURE — 83036 HEMOGLOBIN GLYCOSYLATED A1C: CPT | Performed by: INTERNAL MEDICINE

## 2024-08-28 PROCEDURE — 70496 CT ANGIOGRAPHY HEAD: CPT

## 2024-08-28 PROCEDURE — 82948 REAGENT STRIP/BLOOD GLUCOSE: CPT

## 2024-08-28 PROCEDURE — 99223 1ST HOSP IP/OBS HIGH 75: CPT | Performed by: INTERNAL MEDICINE

## 2024-08-28 PROCEDURE — 0241U HB NFCT DS VIR RESP RNA 4 TRGT: CPT | Performed by: INTERNAL MEDICINE

## 2024-08-28 PROCEDURE — 80053 COMPREHEN METABOLIC PANEL: CPT | Performed by: EMERGENCY MEDICINE

## 2024-08-28 PROCEDURE — 85025 COMPLETE CBC W/AUTO DIFF WBC: CPT | Performed by: EMERGENCY MEDICINE

## 2024-08-28 PROCEDURE — 80061 LIPID PANEL: CPT | Performed by: INTERNAL MEDICINE

## 2024-08-28 RX ORDER — LORAZEPAM 2 MG/ML
0.5 INJECTION INTRAMUSCULAR ONCE AS NEEDED
Status: COMPLETED | OUTPATIENT
Start: 2024-08-28 | End: 2024-08-28

## 2024-08-28 RX ORDER — ENOXAPARIN SODIUM 100 MG/ML
40 INJECTION SUBCUTANEOUS DAILY
Status: DISCONTINUED | OUTPATIENT
Start: 2024-08-29 | End: 2024-08-30 | Stop reason: HOSPADM

## 2024-08-28 RX ORDER — ATORVASTATIN CALCIUM 40 MG/1
40 TABLET, FILM COATED ORAL
Status: DISCONTINUED | OUTPATIENT
Start: 2024-08-28 | End: 2024-08-28

## 2024-08-28 RX ORDER — ASPIRIN 81 MG/1
81 TABLET, CHEWABLE ORAL DAILY
Status: DISCONTINUED | OUTPATIENT
Start: 2024-08-29 | End: 2024-08-29

## 2024-08-28 RX ORDER — ASPIRIN 325 MG
325 TABLET ORAL ONCE
Status: COMPLETED | OUTPATIENT
Start: 2024-08-28 | End: 2024-08-28

## 2024-08-28 RX ORDER — ATORVASTATIN CALCIUM 40 MG/1
40 TABLET, FILM COATED ORAL
Status: DISCONTINUED | OUTPATIENT
Start: 2024-08-28 | End: 2024-08-30 | Stop reason: HOSPADM

## 2024-08-28 RX ORDER — KETOROLAC TROMETHAMINE 30 MG/ML
15 INJECTION, SOLUTION INTRAMUSCULAR; INTRAVENOUS ONCE
Status: COMPLETED | OUTPATIENT
Start: 2024-08-28 | End: 2024-08-28

## 2024-08-28 RX ORDER — MAGNESIUM SULFATE HEPTAHYDRATE 40 MG/ML
2 INJECTION, SOLUTION INTRAVENOUS ONCE
Status: COMPLETED | OUTPATIENT
Start: 2024-08-28 | End: 2024-08-29

## 2024-08-28 RX ORDER — ACETAMINOPHEN 325 MG/1
975 TABLET ORAL EVERY 8 HOURS SCHEDULED
Status: DISCONTINUED | OUTPATIENT
Start: 2024-08-28 | End: 2024-08-30 | Stop reason: HOSPADM

## 2024-08-28 RX ADMIN — MAGNESIUM SULFATE HEPTAHYDRATE 2 G: 40 INJECTION, SOLUTION INTRAVENOUS at 19:11

## 2024-08-28 RX ADMIN — LORAZEPAM 0.5 MG: 2 INJECTION INTRAMUSCULAR; INTRAVENOUS at 20:55

## 2024-08-28 RX ADMIN — ACETAMINOPHEN 975 MG: 325 TABLET, FILM COATED ORAL at 22:31

## 2024-08-28 RX ADMIN — ATORVASTATIN CALCIUM 40 MG: 40 TABLET, FILM COATED ORAL at 19:12

## 2024-08-28 RX ADMIN — ASPIRIN 325 MG ORAL TABLET 325 MG: 325 PILL ORAL at 19:12

## 2024-08-28 RX ADMIN — IOHEXOL 85 ML: 350 INJECTION, SOLUTION INTRAVENOUS at 18:06

## 2024-08-28 RX ADMIN — KETOROLAC TROMETHAMINE 15 MG: 30 INJECTION, SOLUTION INTRAMUSCULAR; INTRAVENOUS at 19:12

## 2024-08-28 NOTE — ED PROVIDER NOTES
History  Chief Complaint   Patient presents with    Headache     Headache to the L eye. 1400 started with numbness to the L side of the face. Headache has subsided but numbness worsening.      The patient is a 49 year old female who presents to ED for evaluation of facial numbness. She states she had a brief sharp left sided headache around 1130am today that spontaneously resolved after a few minutes. She states then around 2pm she noticed left sided facial numbness. She states she does not normally get headaches and she has not taken anything for pain or sx. Denies recent injury or trauma, vision changes, extremity weakness or numbness, speech problem, dizziness, SOB, chest pain, abdominal pain, nausea, vomiting        Prior to Admission Medications   Prescriptions Last Dose Informant Patient Reported? Taking?   Ascorbic Acid (vitamin C) 100 MG tablet  Self Yes No   Sig: Take 100 mg by mouth daily   Omega-3 Fatty Acids (fish oil) 1,000 mg  Self Yes No   Sig: Take 1,000 mg by mouth daily   VITAMIN K PO  Self Yes No   Sig: Take 600 mcg by mouth in the morning   cyanocobalamin (VITAMIN B-12) 250 MCG tablet  Self Yes No   Sig: Take 250 mcg by mouth daily   ergocalciferol (VITAMIN D2) 50,000 units   No No   Sig: Take 1 capsule (50,000 Units total) by mouth once a week Long term.   estradiol (ESTRACE VAGINAL) 0.1 mg/g vaginal cream   No No   Sig: Insert 1 g into the vagina 3 (three) times a week   Patient taking differently: Insert 1 g into the vagina as needed   etonogestrel-ethinyl estradiol (NuvaRing) 0.12-0.015 MG/24HR vaginal ring   No No   Sig: Change every 3 weeks.   folic acid (FOLVITE) 1 mg tablet  Self Yes No   Sig: Take 400 mcg by mouth daily   lisinopril (ZESTRIL) 20 mg tablet   No No   Sig: TAKE ONE TABLET BY MOUTH DAILY   spironolactone (ALDACTONE) 25 mg tablet   No No   Sig: TAKE ONE TABLET BY MOUTH DAILY      Facility-Administered Medications: None       Past Medical History:   Diagnosis Date    Disease  of thyroid gland     2014 nodules on US    Dizziness     Hypertension     Infertility, female     Miscarriage     , ,     Multiple thyroid nodules     Obesity     Resolved 2016    Palpitations     Palpitations     Resolved 2016    PONV (postoperative nausea and vomiting)     Thyroid nodule     Last Assessed: 2014     Thyroid nodule 2019    Vertigo        Past Surgical History:   Procedure Laterality Date    BREAST BIOPSY Right 2001    Percutaneous Needle Core Right , age 27    BREAST LUMPECTOMY  2002    Right     SECTION      CHOLECYSTECTOMY      Laparoscopic     COLPORRHAPHY N/A 2020    Procedure: POST. COLPORRHAPHY;  Surgeon: Buster Santamaria MD;  Location: [facility];  Service: UroGynecology           ENDOMETRIAL ABLATION  2012    LASIK  10/2020    [place] EXCISION EXCESSIVE SKIN & SUBQ TISSUE ABDOMEN N/A 12/10/2021    Procedure: ABDOMINOPLASTY;  Surgeon: Isaias Ogden MD;  Location:  MAIN OR;  Service: Plastics    TUBAL LIGATION      WISDOM TOOTH EXTRACTION         Family History   Problem Relation Age of Onset    Hypertension Mother 40    Heart disease Mother         a.fib, pacemaker, ablations, rheumatic heart disease, MVP    Nephrolithiasis Mother     Diabetes Mother     Hashimoto's thyroiditis Mother     Other Father 65        Bladder Cancer    Cancer Father 68        Bladder cancer    Hypertension Father          bladder cancer age 68    No Known Problems Sister     No Known Problems Sister     No Known Problems Sister     No Known Problems Brother     No Known Problems Brother     No Known Problems Son     No Known Problems Daughter     No Known Problems Daughter     Hypertension Maternal Grandmother 40    Diabetes Maternal Grandmother     Diabetes type II Maternal Grandmother     Other Maternal Grandfather     Stroke Maternal Grandfather         Stroke Syndrome     Prostate cancer Maternal Grandfather 60    Heart disease Maternal Grandfather 70         hx CABG    Cancer Maternal Grandfather         Prostate    Coronary aneurysm Maternal Grandfather         MI at a young age     Diabetes Maternal Grandfather     Diabetes Paternal Grandmother     Cancer Paternal Grandmother 70        Vulvar     Stroke Paternal Grandmother 80    Hypertension Paternal Grandmother     No Known Problems Maternal Aunt     Lymphoma Maternal Uncle 40    Lymphoma Paternal Aunt 50        non hodgkins follicular lymphoma    Hypothyroidism Paternal Aunt     No Known Problems Paternal Aunt     No Known Problems Paternal Aunt     No Known Problems Paternal Aunt     No Known Problems Paternal Aunt     Breast cancer Cousin 47        bilateral , mets to abd/pelvis. Genetic testing negative    Hyperthyroidism Family     Hypothyroidism Family      I have reviewed and agree with the history as documented.    E-Cigarette/Vaping    E-Cigarette Use Never User      E-Cigarette/Vaping Substances    Nicotine No     THC No     CBD No     Flavoring No     Other No     Unknown No      Social History     Tobacco Use    Smoking status: Never    Smokeless tobacco: Never   Vaping Use    Vaping status: Never Used   Substance Use Topics    Alcohol use: Yes     Comment: Rarely    Drug use: No        Review of Systems   Constitutional:  Negative for appetite change, chills and fever.   HENT:  Negative for congestion and sore throat.    Eyes:  Negative for visual disturbance.   Respiratory:  Negative for shortness of breath.    Cardiovascular:  Negative for chest pain, palpitations and leg swelling.   Gastrointestinal:  Negative for abdominal pain, nausea and vomiting.   Genitourinary:  Negative for difficulty urinating and dysuria.   Musculoskeletal:  Negative for back pain, neck pain and neck stiffness.   Skin:  Negative for rash and wound.   Neurological:  Positive for numbness and headaches. Negative for dizziness, tremors, syncope, facial asymmetry, speech difficulty and weakness.   All other systems reviewed and  are negative.      Physical Exam  ED Triage Vitals   Temperature Pulse Respirations Blood Pressure SpO2   08/28/24 1649 08/28/24 1652 08/28/24 1649 08/28/24 1649 08/28/24 1649   98.7 °F (37.1 °C) 91 17 148/74 99 %      Temp Source Heart Rate Source Patient Position - Orthostatic VS BP Location FiO2 (%)   08/28/24 1649 08/28/24 1649 08/28/24 1649 08/28/24 1649 --   Oral Monitor Sitting Right arm       Pain Score       08/28/24 1800       1             Orthostatic Vital Signs  Vitals:    08/28/24 1800 08/28/24 1810 08/28/24 1815 08/28/24 1845   BP: 158/66 160/70 144/67 148/67   Pulse: 81 88 84 84   Patient Position - Orthostatic VS:           Physical Exam    ED Medications  Medications   aspirin tablet 325 mg (has no administration in time range)   ketorolac (TORADOL) injection 15 mg (has no administration in time range)   magnesium sulfate 2 g/50 mL IVPB (premix) 2 g (has no administration in time range)   atorvastatin (LIPITOR) tablet 40 mg (has no administration in time range)   iohexol (OMNIPAQUE) 350 MG/ML injection (MULTI-DOSE) 85 mL (85 mL Intravenous Given 8/28/24 1806)       Diagnostic Studies  Results Reviewed       Procedure Component Value Units Date/Time    POCT pregnancy, urine [[id number]]  (Normal) Resulted: 08/28/24 1810    Lab Status: Final result Updated: 08/28/24 1811     EXT Preg Test, Ur Negative     Control Valid    APTT [[id number]]  (Normal) Collected: 08/28/24 1659    Lab Status: Final result Specimen: Blood from Arm, Left Updated: 08/28/24 1803     PTT 33 seconds     Protime-INR [[id number]]  (Normal) Collected: 08/28/24 1659    Lab Status: Final result Specimen: Blood from Arm, Left Updated: 08/28/24 1803     Protime 12.7 seconds      INR 0.89    Narrative:      INR Therapeutic Range    Indication                                             INR Range      Atrial Fibrillation                                               2.0-3.0  Hypercoagulable State                                     2.0.2.3  Left Ventricular Asist Device                            2.0-3.0  Mechanical Heart Valve                                  -    Aortic(with afib, MI, embolism, HF, LA enlargement,    and/or coagulopathy)                                     2.0-3.0 (2.5-3.5)     Mitral                                                             2.5-3.5  Prosthetic/Bioprosthetic Heart Valve               2.0-3.0  Venous thromboembolism (VTE: VT, PE        2.0-3.0    Fingerstick Glucose (POCT) [[id number]]  (Normal) Collected: 08/28/24 1753    Lab Status: Final result Specimen: Blood Updated: 08/28/24 1758     POC Glucose 90 mg/dl     FLU/RSV/COVID - if FLU/RSV clinically relevant [[id number]]     Lab Status: No result Specimen: Nares from Nose     HS Troponin 0hr (reflex protocol) [[id number]]  (Normal) Collected: 08/28/24 1659    Lab Status: Final result Specimen: Blood from Arm, Left Updated: 08/28/24 1740     hs TnI 0hr 20 ng/L     HS Troponin I 2hr [[id number]]     Lab Status: No result Specimen: Blood     Comprehensive metabolic panel [[id number]] Collected: 08/28/24 1659    Lab Status: Final result Specimen: Blood from Arm, Left Updated: 08/28/24 1738     Sodium 135 mmol/L      Potassium 4.0 mmol/L      Chloride 103 mmol/L      CO2 25 mmol/L      ANION GAP 7 mmol/L      BUN 16 mg/dL      Creatinine 0.81 mg/dL      Glucose 88 mg/dL      Calcium 9.9 mg/dL      AST 17 U/L      ALT 13 U/L      Alkaline Phosphatase 53 U/L      Total Protein 8.2 g/dL      Albumin 4.6 g/dL      Total Bilirubin 0.34 mg/dL      eGFR 86 ml/min/1.73sq m     Narrative:      National Kid[employer] Disease Foundation guidelines for Chronic Kidney Disease (CKD):     Stage 1 with normal or high GFR (GFR > 90 mL/min/1.73 square meters)    Stage 2 Mild CKD (GFR = 60-89 mL/min/1.73 square meters)    Stage 3A Moderate CKD (GFR = 45-59 mL/min/1.73 square meters)    Stage 3B Moderate CKD (GFR = 30-44 mL/min/1.73 square meters)    Stage 4 Severe CKD (GFR = 15-29  mL/min/1.73 square meters)    Stage 5 End Stage CKD (GFR <15 mL/min/1.73 square meters)  Note: GFR calculation is accurate only with a steady state creatinine    CBC and differential [[id number]] Collected: 08/28/24 1659    Lab Status: Final result Specimen: Blood from Arm, Left Updated: 08/28/24 1718     WBC 6.84 Thousand/uL      RBC 4.62 Million/uL      Hemoglobin 13.8 g/dL      Hematocrit 40.8 %      MCV 88 fL      MCH 29.9 pg      MCHC 33.8 g/dL      RDW 12.1 %      MPV 9.1 fL      Platelets 361 Thousands/uL      nRBC 0 /100 WBCs      Segmented % 52 %      Immature Grans % 0 %      Lymphocytes % 39 %      Monocytes % 6 %      Eosinophils Relative 2 %      Basophils Relative 1 %      Absolute Neutrophils 3.57 Thousands/µL      Absolute Immature Grans 0.01 Thousand/uL      Absolute Lymphocytes 2.66 Thousands/µL      Absolute Monocytes 0.44 Thousand/µL      Eosinophils Absolute 0.11 Thousand/µL      Basophils Absolute 0.05 Thousands/µL                    CT stroke alert brain   Final Result by Leo Quesada MD (08/28 1824)      No acute intracranial abnormality.      Findings were directly discussed with Lissette Yoon  at    6:20 PM .      Workstation performed: [id number]         CTA stroke alert (head/neck)   Final Result by Leo Quesada MD (08/28 1823)      No stenosis, dissection or occlusion of the carotid or vertebral arteries or major vessels of the [place]..         No findings were directly discussed with Justo Schneider at  6:20 PM  .      Workstation performed: [id number]         MRI inpatient order    (Results Pending)         Procedures  Procedures      ED Course  ED Course as of 08/28/24 1850   Wed Aug 28, 2024   1828 Will admit to [facility] on stroke pathway per recommendation by DR Yoon and give ASA, 40mg atorvastatin, mag, toradol                  Stroke Assessment       Row Name 08/28/24 1808             NIH Stroke Scale    Interval Baseline      Level of Consciousness (1a.)  0      LOC Questions (1b.) 0      LOC Commands (1c.) 0      Best Gaze (2.) 0      Visual (3.) 0      Facial Palsy (4.) 0      Motor Arm, Left (5a.) 0      Motor Arm, Right (5b.) 0      Motor Leg, Left (6a.) 0      Motor Leg, Right (6b.) 0      Limb Ataxia (7.) 0      Sensory (8.) 1      Best Language (9.) 0      Dysarthria (10.) 0      Extinction and Inattention (11.) (Formerly Neglect) 0      Total 1                                        Medical Decision Making  DDX: complex migraine, CVA, TIA, electrolyte abnormality     Pt presenting with sudden onset headache and subsequent left sided facial numbness. Stroke alert called.   NIH 1  CT and CTA head negative.   Case discussed with neuro. PT felt to not be candidate for thrombolytics due to sx not being significantly disabling  Pt admitted on stroke pathway and IV mag and Toradol given per recommendation from neuro. Also given ASA and statin per recommendation.   Pt admitted in stable condition    Amount and/or Complexity of Data Reviewed  Labs: ordered.  Radiology: ordered.    Risk  OTC drugs.  Prescription drug management.  Decision regarding hospitalization.          Disposition  Final diagnoses:   Left facial numbness   Acute headache   Elevated troponin   Stroke-like symptoms     Time reflects when diagnosis was documented in both MDM as applicable and the Disposition within this note       Time User Action Codes Description Comment    8/28/2024  5:53 PM Jessie Thornton Add [R20.0] Left facial numbness     8/28/2024  6:45 PM Cooper Hagan Add [R51.9] Acute headache     8/28/2024  6:45 PM Cooper Hagan Add [R79.89] Elevated troponin     8/28/2024  6:45 PM Cooper Hagan Add [R29.90] Stroke-like symptoms           ED Disposition       ED Disposition   Admit    Condition   Stable    Date/Time   Wed Aug 28, 2024  6:42 PM    Comment   Case was discussed with JOSE and the patient's admission status was agreed to be Admission Status:  observation status to the service of Dr. Lucía Knight .               Follow-up Information    None         Current Discharge Medication List        CONTINUE these medications which have NOT CHANGED    Details   Ascorbic Acid (vitamin C) 100 MG tablet Take 100 mg by mouth daily      cyanocobalamin (VITAMIN B-12) 250 MCG tablet Take 250 mcg by mouth daily      ergocalciferol (VITAMIN D2) 50,000 units Take 1 capsule (50,000 Units total) by mouth once a week Long term.  Qty: 12 capsule, Refills: 2    Associated Diagnoses: Vitamin D deficiency      estradiol (ESTRACE VAGINAL) 0.1 mg/g vaginal cream Insert 1 g into the vagina 3 (three) times a week  Qty: 42.5 g, Refills: 2    Associated Diagnoses: Atrophic vaginitis      etonogestrel-ethinyl estradiol (NuvaRing) 0.12-0.015 MG/24HR vaginal ring Change every 3 weeks.  Qty: 4 each, Refills: 3    Associated Diagnoses: Preventive measure      folic acid (FOLVITE) 1 mg tablet Take 400 mcg by mouth daily      lisinopril (ZESTRIL) 20 mg tablet TAKE ONE TABLET BY MOUTH DAILY  Qty: 90 tablet, Refills: 0    Associated Diagnoses: Benign essential hypertension; Hypokalemia      Omega-3 Fatty Acids (fish oil) 1,000 mg Take 1,000 mg by mouth daily      spironolactone (ALDACTONE) 25 mg tablet TAKE ONE TABLET BY MOUTH DAILY  Qty: 90 tablet, Refills: 0    Associated Diagnoses: Benign essential hypertension; Hypokalemia      VITAMIN K PO Take 600 mcg by mouth in the morning           No discharge procedures on file.    PDMP Review         Value Time User    PDMP Reviewed  Yes 3/12/2020 12:51 PM Namita Aguayo PA-C             ED Provider  Attending physically available and evaluated Radha Gonzalez. I managed the patient along with the ED Attending.    Electronically Signed by           Jessie Thornton MD  08/29/24 2110       Jessie Thornton MD  08/29/24 0226

## 2024-08-28 NOTE — LETTER
Thank you for allowing us to participate in the care of your patient, Radha Gonzalez, who was hospitalized from 8/28/2024 through 8/30/2024 with the admitting diagnosis of headache and left facial numbness/tingling. MRI brain showing mild white matter changes in cerebral hemispheres. Neuro did not think she was having an acute MS flare but would follow up with this as well as pending thrombosis panel. Her symptoms possibly related to complex migraine.     Medication Changes:  Start atorvastatin 40 mg daily  Start vitamin B12 1000 mcg daily (lab was low-normal so would f/u)    Outpatient testing recommended:  Vitamin B12    If you have any additional questions or would like to discuss further, please feel free to contact me.    Kelsey Yates DO  [facility] Internal Medicine, Hospitalist  [phone]

## 2024-08-28 NOTE — H&P
Former[facility]  H&P  Name: Radha Gonzalez 49 y.o. female I MRN: [id number]  Unit/Bed#: ED-23 I Date of Admission: 8/28/2024   Date of Service: 8/28/2024 I Hospital Day: 0      Assessment & Plan   * Headache  Assessment & Plan  Stroke alert was called due to headache and left facial numbness which started this afternoon.  CT head: No acute intracranial abnormality.  CTA head and neck: No stenosis, dissection or occlusion of the carotid or vertebral arteries or major vessels of the [place] of [place].   Stroke versus complex migraine  ER discussed with neurology who recommended aspirin, statin and MRI brain along with Toradol and mag.    PLAN:  Admit under stroke pathway  Monitor on telemetry  MRI brain, echo, permissive hypertension  Check HbA1c, lipid profile, TSH  Neurology consult  Low Dose aspirin and high intensity statin  Migraine cocktail if needed for headache.    Left facial numbness  Assessment & Plan  Assessment and plan as above.    Benign essential hypertension  Assessment & Plan  Hold Home dose spironolactone and lisinopril for now given permissive hypertension             VTE Prophylaxis: Enoxaparin (Lovenox)  / sequential compression device   Code Status: full    Anticipated Length of Stay:  Patient will be admitted on an Observation basis with an anticipated length of stay of  less than 2 midnights.   Justification for Hospital Stay: ongoing evaluation    Total Time for Visit, including Counseling / Coordination of Care: 60 minutes.  Greater than 50% of this total time spent on direct patient counseling and coordination of care.    Chief Complaint:   headache and left facial numbness    History of Present Illness:    Radha Gonzalez is a 49 y.o. female who presents with headache and left facial numbness which started this afternoon.  Patient reports that she had some sharp discomfort on the left side of her head along with facial numbness which started around 2:00 and progressed  gradually to involve the entire left side of the face.  She denies any focal weakness or any other numbness.  No witnessed facial droop.  She presented to ER and stroke alert was called.  CT and CTA as above.  Patient had some sharp discomfort in the chest yesterday which was brief and resolved spontaneously.   Left sided facial numbness is still there. Headache is better controlled than before but still there,  Denies any known h/o migraine. Reports occasional menstrual headache.   Denies any recent travel outside country.  No tick bites.  No recent infection.    Review of Systems:    Review of Systems    Past Medical and Surgical History:     Past Medical History:   Diagnosis Date    Disease of thyroid gland      nodules on US    Dizziness     Hypertension     Infertility, female     Miscarriage     , ,     Multiple thyroid nodules     Obesity     Resolved 2016    Palpitations     Palpitations     Resolved 2016    PONV (postoperative nausea and vomiting)     Thyroid nodule     Last Assessed: 2014     Thyroid nodule 2019    Vertigo        Past Surgical History:   Procedure Laterality Date    BREAST BIOPSY Right     Percutaneous Needle Core Right , age 27    BREAST LUMPECTOMY      Right     SECTION      CHOLECYSTECTOMY      Laparoscopic     COLPORRHAPHY N/A 2020    Procedure: POST. COLPORRHAPHY;  Surgeon: Buster Santamaria MD;  Location: [facility];  Service: UroGynecology           ENDOMETRIAL ABLATION  2012    LASIK  10/2020    MD EXCISION EXCESSIVE SKIN & SUBQ TISSUE ABDOMEN N/A 12/10/2021    Procedure: ABDOMINOPLASTY;  Surgeon: Isaias Ogden MD;  Location:  MAIN OR;  Service: Plastics    TUBAL LIGATION      WISDOM TOOTH EXTRACTION         Meds/Allergies:    Prior to Admission medications    Medication Sig Start Date End Date Taking? Authorizing Provider   Ascorbic Acid (vitamin C) 100 MG tablet Take 100 mg by mouth daily    Historical Provider, MD    cyanocobalamin (VITAMIN B-12) 250 MCG tablet Take 250 mcg by mouth daily    Historical Provider, MD   ergocalciferol (VITAMIN D2) 50,000 units Take 1 capsule (50,000 Units total) by mouth once a week Long term. 2/23/24   Migdalia Viera DO   estradiol (ESTRACE VAGINAL) 0.1 mg/g vaginal cream Insert 1 g into the vagina 3 (three) times a week  Patient taking differently: Insert 1 g into the vagina as needed 3/8/24   Vida Monreal MD   etonogestrel-ethinyl estradiol (NuvaRing) 0.12-0.015 MG/24HR vaginal ring Change every 3 weeks. 7/3/24   Vida Monreal MD   folic acid (FOLVITE) 1 mg tablet Take 400 mcg by mouth daily    Historical Provider, MD   lisinopril (ZESTRIL) 20 mg tablet TAKE ONE TABLET BY MOUTH DAILY 5/31/24   Migdalia Viera DO   Omega-3 Fatty Acids (fish oil) 1,000 mg Take 1,000 mg by mouth daily    Historical Provider, MD   spironolactone (ALDACTONE) 25 mg tablet TAKE ONE TABLET BY MOUTH DAILY 5/31/24   Migdalia Viera DO   VITAMIN K PO Take 600 mcg by mouth in the morning    Historical Provider, MD     I have reviewed home medications with patient personally.    Allergies: No Known Allergies    Social History:     Marital Status: /Civil Union   Substance Use History:   Social History     Substance and Sexual Activity   Alcohol Use Yes    Comment: Rarely     Social History     Tobacco Use   Smoking Status Never   Smokeless Tobacco Never     Social History     Substance and Sexual Activity   Drug Use No         Physical Exam:     Vitals:   Blood Pressure: 155/68 (08/28/24 1900)  Pulse: 85 (08/28/24 1900)  Temperature: 98.7 °F (37.1 °C) (08/28/24 1649)  Temp Source: Oral (08/28/24 1649)  Respirations: 18 (08/28/24 1900)  Weight - Scale: 76.3 kg (168 lb 3.4 oz) (08/28/24 1800)  SpO2: 98 % (08/28/24 1900)    Physical Exam  Constitutional:       General: She is not in acute distress.  Cardiovascular:      Rate and Rhythm: Normal rate and regular rhythm.      Heart sounds: Normal heart sounds. No  murmur heard.  Pulmonary:      Effort: No respiratory distress.      Breath sounds: Normal breath sounds. No wheezing or rales.   Abdominal:      General: Bowel sounds are normal. There is no distension.      Palpations: Abdomen is soft.      Tenderness: There is no abdominal tenderness.   Skin:     General: Skin is warm.   Neurological:      Mental Status: She is alert.      Cranial Nerves: No cranial nerve deficit.      Sensory: Sensory deficit present.      Motor: No weakness.      Comments: Awake alert and communicative  Able to raise bilateral upper and lower extremity of the bed without any drift.    Sensations are decreased on left side of the face.             Additional Data:     Lab Results: I have personally reviewed pertinent reports.      Results from last 7 days   Lab Units 08/28/24  1659   WBC Thousand/uL 6.84   HEMOGLOBIN g/dL 13.8   HEMATOCRIT % 40.8   PLATELETS Thousands/uL 361   SEGS PCT % 52   LYMPHO PCT % 39   MONO PCT % 6   EOS PCT % 2     Results from last 7 days   Lab Units 08/28/24  1659   SODIUM mmol/L 135   POTASSIUM mmol/L 4.0   CHLORIDE mmol/L 103   CO2 mmol/L 25   BUN mg/dL 16   CREATININE mg/dL 0.81   ANION GAP mmol/L 7   CALCIUM mg/dL 9.9   ALBUMIN g/dL 4.6   TOTAL BILIRUBIN mg/dL 0.34   ALK PHOS U/L 53   ALT U/L 13   AST U/L 17   GLUCOSE RANDOM mg/dL 88     Results from last 7 days   Lab Units 08/28/24  1659   INR  0.89     Results from last 7 days   Lab Units 08/28/24  1753   POC GLUCOSE mg/dl 90                 CT stroke alert brain   Final Result by Leo Quesada MD (08/28 1824)      No acute intracranial abnormality.      Findings were directly discussed with Lissette Yoon  at    6:20 PM .      Workstation performed: [id number]         CTA stroke alert (head/neck)   Final Result by Leo Quesada MD (08/28 1823)      No stenosis, dissection or occlusion of the carotid or vertebral arteries or major vessels of the [place] of [place]..         No findings were directly  discussed with Justo Schneider at  6:20 PM  .      Workstation performed: [id number]         MRI inpatient order    (Results Pending)         Allscripts / Epic Records Reviewed: Yes     ** Please Note: This note has been constructed using a voice recognition system. **

## 2024-08-28 NOTE — ASSESSMENT & PLAN NOTE
Stroke alert was called due to headache and left facial numbness which started this afternoon.  CT head: No acute intracranial abnormality.  CTA head and neck: No stenosis, dissection or occlusion of the carotid or vertebral arteries or major vessels of the [place].   Stroke versus complex migraine  ER discussed with neurology who recommended aspirin, statin and MRI brain along with Toradol and mag.    PLAN:  Admit under stroke pathway  Monitor on telemetry  MRI brain, echo, permissive hypertension  Check HbA1c, lipid profile, TSH  Neurology consult  Low Dose aspirin and high intensity statin  Migraine cocktail if needed for headache.

## 2024-08-28 NOTE — ED ATTENDING ATTESTATION
8/28/2024  I, Cooper Hagan MD, saw and evaluated the patient. I have discussed the patient with the resident/non-physician practitioner and agree with the resident's/non-physician practitioner's findings, Plan of Care, and MDM as documented in the resident's/non-physician practitioner's note, except where noted. All available labs and Radiology studies were reviewed.  I was present for key portions of any procedure(s) performed by the resident/non-physician practitioner and I was immediately available to provide assistance.       At this point I agree with the current assessment done in the Emergency Department.  I have conducted an independent evaluation of this patient a history and physical is as follows:    History    Patient is a 49-year-old female, with a history significant for hypertension and obesity per my review of medical record, who presents to the ED today for evaluation of sudden onset of atraumatic sharp headache that began at 11:30 AM.  Patient denies history of similar headaches/headache history in general.  Patient states his headache resolved spontaneously but, later in the day around 2 PM, developed left-sided facial numbness that has since progressively worsened to include the whole left side of her face.  Patient denies any recent dental work.  No clear exacerbating factors.  Patient has not taken anything to remit her symptoms.  There is no associated fever, dizziness, chest pain, dyspnea, abdominal, urinary symptoms, weakness/numbness elsewhere.    Patient is without other concerns at this time.     ROS  Patient denies: Fever; dysphagia; vision change; chest pain; dyspnea; abdominal pain; polyuria; dysuria; rash; weakness; difficulty walking; confusion    Physical Exam    GENERAL APPEARANCE: NAD  NEURO: Decreased sensation of the left face: Cranial nerves 2-12 otherwise intact.  5/5 strength in all four extremities including finger extension against resistance.  Sensation to  light touch subjectively intact/equal in all four extremities and the face.  Patient able to ambulate without difficulty.  Patient rotating her neck spontaneously without discomfort.  Patient is speaking clearly in complete sentences.  Patient is answering appropriately and able to follow commands.   HEENT: PERRL, Moist mucous membranes, external ears normal, nose normal  Neck: No cervical adenopathy  CV: RRR. No murmurs, rubs, gallops  LUNGS: Clear to auscultation: No wheezes, stridor, rhonchi, rales  GI: Abdomen non-distended. Soft. Non-tender and without rebound or guarding   : Deferred at this time  MSK: No deformity.   Skin: Warm and dry  Capillary refill: <2 seconds    Patient is currently afebrile and hemodynamically stable    Assessment/Plan/MDM  Headache, facial numbness  -This presentation is concerning for: Complicated migraine, electrolyte abnormality, pregnancy/pregnancy complication.  I also considered mass lesion, stroke, SAH.  No reason to suspect meningitis based on history physical exam  -Patient made stroke alert after my evaluation  -Will investigate with stroke alert order set  -Will discuss with neurology  -Will manage based on workup/recommendations    ED Course  ED Course as of 08/28/24 1831   Wed Aug 28, 2024   1807 hs TnI 0hr: 20  Elevated    1815 PREGNANCY TEST URINE: Negative  WNL   1829 I discussed with Dr. Yoon from neurology, Toradol/magnesium administration as well as aspirin/statin initiation recommended.  Admission for MRI/stroke pathway recommended.  Lytics not recommended at this time.  I discussed workup to this point as well as neurology recommendations with patient as well as information about lytics and patient in agreement with current plan of Toradol/magnesium/aspirin/statin/MRI.  Questions answered to patient satisfaction         Critical Care Time  Procedures

## 2024-08-29 ENCOUNTER — APPOINTMENT (OUTPATIENT)
Dept: NON INVASIVE DIAGNOSTICS | Facility: HOSPITAL | Age: 49
End: 2024-08-29
Payer: COMMERCIAL

## 2024-08-29 ENCOUNTER — APPOINTMENT (OUTPATIENT)
Dept: MRI IMAGING | Facility: HOSPITAL | Age: 49
End: 2024-08-29
Payer: COMMERCIAL

## 2024-08-29 LAB
25(OH)D3 SERPL-MCNC: 45.8 NG/ML (ref 30–100)
ANA SER QL IA: NEGATIVE
AORTIC ROOT: 2.8 CM
APICAL FOUR CHAMBER EJECTION FRACTION: 63 %
ASCENDING AORTA: 2.9 CM
BSA FOR ECHO PROCEDURE: 1.71 M2
CHOLEST SERPL-MCNC: 228 MG/DL
CRP SERPL QL: 4.6 MG/L
E WAVE DECELERATION TIME: 308 MS
E/A RATIO: 1.26
ERYTHROCYTE [SEDIMENTATION RATE] IN BLOOD: 25 MM/HOUR (ref 0–19)
FRACTIONAL SHORTENING: 39 (ref 28–44)
HDLC SERPL-MCNC: 71 MG/DL
INTERVENTRICULAR SEPTUM IN DIASTOLE (PARASTERNAL SHORT AXIS VIEW): 1.1 CM
INTERVENTRICULAR SEPTUM: 1.1 CM (ref 0.6–1.1)
LAAS-AP2: 12.1 CM2
LAAS-AP4: 12.3 CM2
LDLC SERPL CALC-MCNC: 131 MG/DL (ref 0–100)
LEFT ATRIUM SIZE: 2.8 CM
LEFT ATRIUM VOLUME (MOD BIPLANE): 26 ML
LEFT ATRIUM VOLUME INDEX (MOD BIPLANE): 15.2 ML/M2
LEFT INTERNAL DIMENSION IN SYSTOLE: 2.2 CM (ref 2.1–4)
LEFT VENTRICULAR INTERNAL DIMENSION IN DIASTOLE: 3.6 CM (ref 3.5–6)
LEFT VENTRICULAR POSTERIOR WALL IN END DIASTOLE: 1.1 CM
LEFT VENTRICULAR STROKE VOLUME: 38 ML
LVSV (TEICH): 38 ML
MV E'TISSUE VEL-LAT: 16 CM/S
MV E'TISSUE VEL-SEP: 11 CM/S
MV PEAK A VEL: 0.9 M/S
MV PEAK E VEL: 113 CM/S
MV STENOSIS PRESSURE HALF TIME: 89 MS
MV VALVE AREA P 1/2 METHOD: 2.47
RA PRESSURE ESTIMATED: 5 MMHG
RIGHT ATRIUM AREA SYSTOLE A4C: 11 CM2
RIGHT VENTRICLE ID DIMENSION: 3.1 CM
RV PSP: 23 MMHG
SL CV LEFT ATRIUM LENGTH A2C: 4.7 CM
SL CV LV EF: 65
SL CV PED ECHO LEFT VENTRICLE DIASTOLIC VOLUME (MOD BIPLANE) 2D: 55 ML
SL CV PED ECHO LEFT VENTRICLE SYSTOLIC VOLUME (MOD BIPLANE) 2D: 17 ML
TR MAX PG: 18 MMHG
TR PEAK VELOCITY: 2.2 M/S
TRICUSPID ANNULAR PLANE SYSTOLIC EXCURSION: 2.5 CM
TRICUSPID VALVE PEAK REGURGITATION VELOCITY: 2.15 M/S
TRIGL SERPL-MCNC: 131 MG/DL
TSH SERPL DL<=0.05 MIU/L-ACNC: 1.82 UIU/ML (ref 0.45–4.5)
VIT B12 SERPL-MCNC: 375 PG/ML (ref 180–914)

## 2024-08-29 PROCEDURE — 85598 HEXAGNAL PHOSPH PLTLT NEUTRL: CPT

## 2024-08-29 PROCEDURE — 85305 CLOT INHIBIT PROT S TOTAL: CPT

## 2024-08-29 PROCEDURE — 86146 BETA-2 GLYCOPROTEIN ANTIBODY: CPT

## 2024-08-29 PROCEDURE — 99244 OFF/OP CNSLTJ NEW/EST MOD 40: CPT | Performed by: PSYCHIATRY & NEUROLOGY

## 2024-08-29 PROCEDURE — 86147 CARDIOLIPIN ANTIBODY EA IG: CPT

## 2024-08-29 PROCEDURE — 82306 VITAMIN D 25 HYDROXY: CPT

## 2024-08-29 PROCEDURE — 85300 ANTITHROMBIN III ACTIVITY: CPT

## 2024-08-29 PROCEDURE — 93306 TTE W/DOPPLER COMPLETE: CPT | Performed by: INTERNAL MEDICINE

## 2024-08-29 PROCEDURE — 85670 THROMBIN TIME PLASMA: CPT

## 2024-08-29 PROCEDURE — 85306 CLOT INHIBIT PROT S FREE: CPT

## 2024-08-29 PROCEDURE — 85705 THROMBOPLASTIN INHIBITION: CPT

## 2024-08-29 PROCEDURE — 85303 CLOT INHIBIT PROT C ACTIVITY: CPT

## 2024-08-29 PROCEDURE — 85732 THROMBOPLASTIN TIME PARTIAL: CPT

## 2024-08-29 PROCEDURE — 82607 VITAMIN B-12: CPT

## 2024-08-29 PROCEDURE — 85652 RBC SED RATE AUTOMATED: CPT

## 2024-08-29 PROCEDURE — A9585 GADOBUTROL INJECTION: HCPCS | Performed by: INTERNAL MEDICINE

## 2024-08-29 PROCEDURE — 86140 C-REACTIVE PROTEIN: CPT

## 2024-08-29 PROCEDURE — 86038 ANTINUCLEAR ANTIBODIES: CPT

## 2024-08-29 PROCEDURE — 99232 SBSQ HOSP IP/OBS MODERATE 35: CPT | Performed by: INTERNAL MEDICINE

## 2024-08-29 PROCEDURE — 85613 RUSSELL VIPER VENOM DILUTED: CPT

## 2024-08-29 PROCEDURE — 93306 TTE W/DOPPLER COMPLETE: CPT

## 2024-08-29 PROCEDURE — 70553 MRI BRAIN STEM W/O & W/DYE: CPT

## 2024-08-29 RX ORDER — LORAZEPAM 2 MG/ML
0.5 INJECTION INTRAMUSCULAR
Status: DISCONTINUED | OUTPATIENT
Start: 2024-08-29 | End: 2024-08-29

## 2024-08-29 RX ORDER — GADOBUTROL 604.72 MG/ML
7.5 INJECTION INTRAVENOUS
Status: COMPLETED | OUTPATIENT
Start: 2024-08-29 | End: 2024-08-29

## 2024-08-29 RX ORDER — LORAZEPAM 2 MG/ML
0.5 INJECTION INTRAMUSCULAR ONCE AS NEEDED
Status: COMPLETED | OUTPATIENT
Start: 2024-08-29 | End: 2024-08-29

## 2024-08-29 RX ORDER — ASPIRIN 81 MG/1
81 TABLET, CHEWABLE ORAL DAILY
Status: DISCONTINUED | OUTPATIENT
Start: 2024-08-29 | End: 2024-08-30 | Stop reason: HOSPADM

## 2024-08-29 RX ADMIN — ACETAMINOPHEN 975 MG: 325 TABLET, FILM COATED ORAL at 21:57

## 2024-08-29 RX ADMIN — ASPIRIN 81 MG 81 MG: 81 TABLET ORAL at 10:04

## 2024-08-29 RX ADMIN — GADOBUTROL 7.5 ML: 604.72 INJECTION INTRAVENOUS at 17:13

## 2024-08-29 RX ADMIN — LORAZEPAM 0.5 MG: 2 INJECTION INTRAMUSCULAR; INTRAVENOUS at 16:17

## 2024-08-29 RX ADMIN — ATORVASTATIN CALCIUM 40 MG: 40 TABLET, FILM COATED ORAL at 21:57

## 2024-08-29 RX ADMIN — ENOXAPARIN SODIUM 40 MG: 40 INJECTION SUBCUTANEOUS at 10:04

## 2024-08-29 NOTE — CONSULTS
NEUROLOGY RESIDENCY CONSULT NOTE     Name: Radha Gonzalez   Age & Sex: 49 y.o. female   MRN: [id number]  Unit/Bed#: S -01   Encounter: [id number]  Length of Stay: 0    Recommendations for outpatient neurological follow up have yet to be determined.   This should be completed prior to removing patient from list or discharge     ASSESSMENT & PLAN     Left facial numbness  Assessment & Plan  This is a 49 year old female who presented to the ED following an sudden onset headache yesterday around 11:30 AM and subsequent worsening left facial numbness around 2 pm. Neurology is consulted for workup of this left-sided facial numbness as a stroke-like symptom. She continues to have facial numbness today, but states it has changed to a tingling sensation in her lips and forehead. No other neuro deficits seen on exam.     Results:  Lab Results   Component Value Date    GLUC 88 08/28/2024    LDLCALC 131 (H) 08/28/2024    HDL 71 08/28/2024    TRIG 131 08/28/2024   HS troponin 2h: 9 ng/ml 8/28/24  HS troponin 4h: 9 ng/ml 8/28/24    MRI:  No acute intracranial pathology.     CTA Head and Neck:  No stenosis, dissection or occlusion of the carotid or vertebral arteries or major vessels of the [place] of [place]..     Head CT:  No acute intracranial abnormality.     Impression:  Patient with all imaging negative for acute process and benign neurological exam this morning. Possible TIA vs bell's palsy vs less likely onset of multiple sclerosis.    Plan:  Brain MRI with contrast, MS protocol  Lipid Panel  Hemoglobin A1c  Aspirin 81 mg   Atorvastatin 40 mg  Permissive HTN  Frequent neuro checks. Continue to monitor and notify neurology with any changes.   - Medical management and supportive care per primary team.           SUBJECTIVE     Reason for Consult / Principal Problem: Stroke-like symptom  Hx and PE limited by: None    HPI: Radha Gonzalez is a 49 y.o.  female who presents with left sided facial numbness. Yesterday prior to  lunchtime Radha experienced an acute onset headache, which she describes as a 'jolt' in the area of her left forehead. She says this feels different from a normal headache, but that a headache is the best way she can describe it. Pain was 3/10. Shortly thereafter around 2 pm she began experiencing numbness that started in her L forehead and gradually descended to her cheek and lips. Her pain resolved after less than an hour.    On exam, she has no neurological deficits save for reported decreased sensation in her left forehead and cheek. She has no pain. She denies other symptomatology at present. She reports a history of premenstrual migraines, which have not been present for 1 year after starting a Nuvaring. Her family history is significant for a stroke in both paternal grandparents at old ages.     Inpatient consult to Neurology  Consult performed by: Phillip Jennings MD  Consult ordered by: Cooper Hagan MD          Historical Information   Past Medical History:   Diagnosis Date    Disease of thyroid gland     2014 nodules on US    Dizziness     Hypertension     Infertility, female     Miscarriage     , ,     Multiple thyroid nodules     Obesity     Resolved 2016    Palpitations     Palpitations     Resolved 2016    PONV (postoperative nausea and vomiting)     Thyroid nodule     Last Assessed: 2014     Thyroid nodule 2019    Vertigo      Past Surgical History:   Procedure Laterality Date    BREAST BIOPSY Right     Percutaneous Needle Core Right , age 27    BREAST LUMPECTOMY  2002    Right     SECTION      CHOLECYSTECTOMY      Laparoscopic     COLPORRHAPHY N/A 2020    Procedure: POST. COLPORRHAPHY;  Surgeon: Buster Santamaria MD;  Location: [facility];  Service: UroGynecology           ENDOMETRIAL ABLATION  2012    LASIK  10/2020    OR EXCISION EXCESSIVE SKIN & SUBQ TISSUE ABDOMEN N/A 12/10/2021    Procedure: ABDOMINOPLASTY;  Surgeon: Isaias Ogden  MD;  Location:  MAIN OR;  Service: Plastics    TUBAL LIGATION      WISDOM TOOTH EXTRACTION       Social History   Social History     Substance and Sexual Activity   Alcohol Use Yes    Comment: Rarely     Social History     Substance and Sexual Activity   Drug Use No     E-Cigarette/Vaping    E-Cigarette Use Never User      E-Cigarette/Vaping Substances    Nicotine No     THC No     CBD No     Flavoring No     Other No     Unknown No      Social History     Tobacco Use   Smoking Status Never   Smokeless Tobacco Never     Family History:   Family History   Problem Relation Age of Onset    Hypertension Mother 40    Heart disease Mother         a.fib, pacemaker, ablations, rheumatic heart disease, MVP    Nephrolithiasis Mother     Diabetes Mother     Hashimoto's thyroiditis Mother     Other Father 65        Bladder Cancer    Cancer Father 68        Bladder cancer    Hypertension Father          bladder cancer age 68    No Known Problems Sister     No Known Problems Sister     No Known Problems Sister     No Known Problems Brother     No Known Problems Brother     No Known Problems Son     No Known Problems Daughter     No Known Problems Daughter     Hypertension Maternal Grandmother 40    Diabetes Maternal Grandmother     Diabetes type II Maternal Grandmother     Other Maternal Grandfather     Stroke Maternal Grandfather         Stroke Syndrome     Prostate cancer Maternal Grandfather 60    Heart disease Maternal Grandfather 70        hx CABG    Cancer Maternal Grandfather         Prostate    Coronary aneurysm Maternal Grandfather         MI at a young age     Diabetes Maternal Grandfather     Diabetes Paternal Grandmother     Cancer Paternal Grandmother 70        Vulvar     Stroke Paternal Grandmother 80    Hypertension Paternal Grandmother     No Known Problems Maternal Aunt     Lymphoma Maternal Uncle 40    Lymphoma Paternal Aunt 50        non hodgkins follicular lymphoma    Hypothyroidism Paternal Aunt      No Known Problems Paternal Aunt     No Known Problems Paternal Aunt     No Known Problems Paternal Aunt     No Known Problems Paternal Aunt     Breast cancer Cousin 47        bilateral , mets to abd/pelvis. Genetic testing negative    Hyperthyroidism Family     Hypothyroidism Family      Meds/Allergies   current meds:   Current Facility-Administered Medications   Medication Dose Route Frequency    acetaminophen (TYLENOL) tablet 975 mg  975 mg Oral Q8H OMERO    aspirin chewable tablet 81 mg  81 mg Oral Daily    atorvastatin (LIPITOR) tablet 40 mg  40 mg Oral HS    enoxaparin (LOVENOX) subcutaneous injection 40 mg  40 mg Subcutaneous Daily    LORazepam (ATIVAN) injection 0.5 mg  0.5 mg Intravenous Once PRN     No Known Allergies    Review of previous medical records was  completed.       Review of Systems   Neurological:  Positive for numbness (left-sided). Negative for dizziness, tremors, seizures, syncope, facial asymmetry, speech difficulty, weakness, light-headedness and headaches.       OBJECTIVE     Patient ID: Radha Gonzalez is a 49 y.o. female.    Vitals:   Vitals:    24 0629 24 0810 24 0928 24 1106   BP: 113/55 139/72 139/72 133/65   BP Location:       Pulse: 55 71 71 70   Resp: 16   19   Temp:    98.7 °F (37.1 °C)   TempSrc:       SpO2: 96% 96%  98%   Weight:   73.5 kg (162 lb)    Height:   5' (1.524 m)       Body mass index is 31.64 kg/m².     Intake/Output Summary (Last 24 hours) at 2024 1112  Last data filed at 2024 0900  Gross per 24 hour   Intake 640 ml   Output --   Net 640 ml       Temperature:   Temp (24hrs), Av.7 °F (37.1 °C), Min:98.6 °F (37 °C), Max:99.1 °F (37.3 °C)    Temperature: 98.7 °F (37.1 °C)    Invasive Devices:   Invasive Devices       Peripheral Intravenous Line  Duration             Peripheral IV 24 Left Antecubital <1 day              Drain  Duration             Closed/Suction Drain Left;Midline;Inferior Abdomen Bulb 15 Fr. 993 days     Closed/Suction Drain Right;Midline;Inferior Abdomen Bulb 15 Fr. 993 days                    Physical Exam  Constitutional:       Appearance: Normal appearance.   HENT:      Head: Normocephalic and atraumatic.   Eyes:      Extraocular Movements: Extraocular movements intact and EOM normal.      Pupils: Pupils are equal, round, and reactive to light.   Cardiovascular:      Rate and Rhythm: Normal rate and regular rhythm.   Neurological:      Mental Status: She is alert and oriented to person, place, and time.      Motor: Motor strength is normal.     Coordination: Finger-Nose-Finger Test and Heel to Shin Test normal.      Gait: Gait is intact.      Deep Tendon Reflexes:      Reflex Scores:       Tricep reflexes are 2+ on the right side and 2+ on the left side.       Bicep reflexes are 2+ on the right side and 2+ on the left side.       Brachioradialis reflexes are 2+ on the right side and 2+ on the left side.       Patellar reflexes are 2+ on the right side and 2+ on the left side.       Achilles reflexes are 2+ on the right side and 2+ on the left side.  Psychiatric:         Speech: Speech normal.          Neurologic Exam     Mental Status   Oriented to person, place, and time.   Attention: normal. Concentration: normal.   Speech: speech is normal   Level of consciousness: alert  Knowledge: good.   Able to name object. Able to read. Able to repeat. Able to write. Normal comprehension.     Cranial Nerves     CN II   Visual fields full to confrontation.     CN III, IV, VI   Pupils are equal, round, and reactive to light.  Extraocular motions are normal.     CN V   Right facial sensation deficit: none  Left facial sensation deficit: forehead and cheeks    CN VII   Facial expression full, symmetric.     CN VIII   CN VIII normal.     CN IX, X   CN IX normal.   CN X normal.     CN XI   CN XI normal.     CN XII   CN XII normal.     Motor Exam   Muscle bulk: normal  Overall muscle tone: normal    Strength   Strength 5/5  throughout.     Sensory Exam   Light touch normal.   Vibration normal.   Proprioception normal.   Pinprick normal.     Gait, Coordination, and Reflexes     Gait  Gait: normal    Coordination   Finger to nose coordination: normal  Heel to shin coordination: normal    Tremor   Resting tremor: absent  Intention tremor: absent  Action tremor: absent    Reflexes   Right brachioradialis: 2+  Left brachioradialis: 2+  Right biceps: 2+  Left biceps: 2+  Right triceps: 2+  Left triceps: 2+  Right patellar: 2+  Left patellar: 2+  Right achilles: 2+  Left achilles: 2+  Right : 2+  Left : 2+  Right plantar: normal  Left plantar: normal  Right ankle clonus: absent       LABORATORY DATA     Labs: I have personally reviewed pertinent reports.    Results from last 7 days   Lab Units 08/28/24  1659   WBC Thousand/uL 6.84   HEMOGLOBIN g/dL 13.8   HEMATOCRIT % 40.8   PLATELETS Thousands/uL 361   SEGS PCT % 52   MONO PCT % 6   EOS PCT % 2      Results from last 7 days   Lab Units 08/28/24  1659   POTASSIUM mmol/L 4.0   CHLORIDE mmol/L 103   CO2 mmol/L 25   BUN mg/dL 16   CREATININE mg/dL 0.81   CALCIUM mg/dL 9.9   ALK PHOS U/L 53   ALT U/L 13   AST U/L 17              Results from last 7 days   Lab Units 08/28/24  1659   INR  0.89   PTT seconds 33               IMAGING & DIAGNOSTIC TESTING     Radiology Results: I have personally reviewed pertinent reports.    MRI brain wo contrast   Final Result by Sandy Vicente MD (08/28 2144)      No acute intracranial pathology.      Nonemergent findings above.         Workstation performed: [id number]         CT stroke alert brain   Final Result by Leo Quesada MD (08/28 1824)      No acute intracranial abnormality.      Findings were directly discussed with Lissette Yoon  at    6:20 PM .      Workstation performed: [id number]         CTA stroke alert (head/neck)   Final Result by Leo Quesada MD (08/28 1823)      No stenosis, dissection or occlusion of the carotid or  vertebral arteries or major vessels of the [place] of [place]..         No findings were directly discussed with Justo Schneider at  6:20 PM  .      Workstation performed: [id number]         MRI inpatient order    (Results Pending)       Other Diagnostic Testing: I have personally reviewed pertinent reports.      ACTIVE MEDICATIONS     Current Facility-Administered Medications   Medication Dose Route Frequency    acetaminophen (TYLENOL) tablet 975 mg  975 mg Oral Q8H OMERO    aspirin chewable tablet 81 mg  81 mg Oral Daily    atorvastatin (LIPITOR) tablet 40 mg  40 mg Oral HS    enoxaparin (LOVENOX) subcutaneous injection 40 mg  40 mg Subcutaneous Daily    LORazepam (ATIVAN) injection 0.5 mg  0.5 mg Intravenous Once PRN       Prior to Admission medications    Medication Sig Start Date End Date Taking? Authorizing Provider   Ascorbic Acid (vitamin C) 100 MG tablet Take 100 mg by mouth daily    Historical Provider, MD   cyanocobalamin (VITAMIN B-12) 250 MCG tablet Take 250 mcg by mouth daily    Historical Provider, MD   ergocalciferol (VITAMIN D2) 50,000 units Take 1 capsule (50,000 Units total) by mouth once a week Long term. 2/23/24   Migdalia Viera DO   estradiol (ESTRACE VAGINAL) 0.1 mg/g vaginal cream Insert 1 g into the vagina 3 (three) times a week  Patient taking differently: Insert 1 g into the vagina as needed 3/8/24   Vida Monreal MD   etonogestrel-ethinyl estradiol (NuvaRing) 0.12-0.015 MG/24HR vaginal ring Change every 3 weeks. 7/3/24   Vida Monreal MD   folic acid (FOLVITE) 1 mg tablet Take 400 mcg by mouth daily    Historical Provider, MD   lisinopril (ZESTRIL) 20 mg tablet TAKE ONE TABLET BY MOUTH DAILY 5/31/24   Migdalia Viera DO   Omega-3 Fatty Acids (fish oil) 1,000 mg Take 1,000 mg by mouth daily    Historical Provider, MD   spironolactone (ALDACTONE) 25 mg tablet TAKE ONE TABLET BY MOUTH DAILY 5/31/24   Migdalia Viera DO   VITAMIN K PO Take 600 mcg by mouth in the morning    Historical  Provider, MD       CODE STATUS & ADVANCED DIRECTIVES     Code Status: Level 1 - Full Code  Advance Directive and Living Will:      Power of :    POLST:        VTE Pharmacologic Prophylaxis: Enoxaparin (Lovenox)  VTE Mechanical Prophylaxis: sequential compression device and foot pump applied    ======    I have discussed the patient's history, physical exam findings, assessment, and plan in detail with attending, Dr. Yoon    Thank you for allowing me to participate in the care of your patient, Radha Gonzalez.    Phillip Jennings MD  [facility] Neurology Residency, PGY-1

## 2024-08-29 NOTE — PLAN OF CARE
Problem: Knowledge Deficit  Goal: Patient/family/caregiver demonstrates understanding of disease process, treatment plan, medications, and discharge instructions  Description: Complete learning assessment and assess knowledge base.  Interventions:  - Provide teaching at level of understanding  - Provide teaching via preferred learning methods  Outcome: Progressing     Problem: Potential for Falls  Goal: Patient will remain free of falls  Description: INTERVENTIONS:  - Educate patient/family on patient safety including physical limitations  - Instruct patient to call for assistance with activity   - Consult OT/PT to assist with strengthening/mobility   - Keep Call bell within reach  - Keep bed low and locked with side rails adjusted as appropriate  - Keep care items and personal belongings within reach  - Initiate and maintain comfort rounds  - Make Fall Risk Sign visible to staff  - Apply yellow socks and bracelet for high fall risk patients  - Consider moving patient to room near nurses station  Outcome: Progressing     Problem: NEUROSENSORY - ADULT  Goal: Achieves stable or improved neurological status  Description: INTERVENTIONS  - Monitor and report changes in neurological status  - Monitor vital signs such as temperature, blood pressure, glucose, and any other labs ordered   - Initiate measures to prevent increased intracranial pressure  - Monitor for seizure activity and implement precautions if appropriate      Outcome: Progressing  Goal: Remains free of injury related to seizures activity  Description: INTERVENTIONS  - Maintain airway, patient safety  and administer oxygen as ordered  - Monitor patient for seizure activity, document and report duration and description of seizure to physician/advanced practitioner  - If seizure occurs,  ensure patient safety during seizure  - Reorient patient post seizure  - Seizure pads on all 4 side rails  - Instruct patient/family to notify RN of any seizure activity  including if an aura is experienced  - Instruct patient/family to call for assistance with activity based on nursing assessment  - Administer anti-seizure medications if ordered    Outcome: Progressing  Goal: Achieves maximal functionality and self care  Description: INTERVENTIONS  - Monitor swallowing and airway patency with patient fatigue and changes in neurological status  - Encourage and assist patient to increase activity and self care.   - Encourage visually impaired, hearing impaired and aphasic patients to use assistive/communication devices  Outcome: Progressing

## 2024-08-29 NOTE — ASSESSMENT & PLAN NOTE
This is a 49 year old female who presented to the ED following an sudden onset headache  and subsequent worsening left facial numbness. MRI with contrast completed, results below     Results:    MRI with contrast:  Mild white matter change within the cerebral hemispheres, nonspecific for a patient of this age and unchanged compared to the prior exam. Findings may represent early chronic microangiopathic change. Other possible etiologies would include stable minor   chronic demyelinating disease or sequela of other infectious/inflammatory etiology such as collagen vascular disorders.     No acute intracranial abnormality.       Impression:  No concern for acute MS flare or other acute pathology at this time. Patient's numbness is continuing to improve. Neurology will sign off.    Plan:  Patient appears stable for discharge from a neurological standpoint  Follow up on thrombosis panel due to patient being on Nuvaring  Recommend following up B12 studies in outpatient setting for possible deficiency  Recommend continuing statin therapy in outpatient due to hyperlipidemia during hospital stay  - Medical management and supportive care per primary team.

## 2024-08-29 NOTE — PROGRESS NOTES
[facility]  Progress Note  Name: Radha Gonzalez I  MRN: [id number]  Unit/Bed#: S -01 I Date of Admission: 8/28/2024   Date of Service: 8/29/2024 I Hospital Day: 0    Assessment & Plan   * Headache  Assessment & Plan  Stroke alert was called due to headache and left facial numbness which started this afternoon.  CT head: No acute intracranial abnormality.  CTA head and neck: No stenosis, dissection or occlusion of the carotid or vertebral arteries or major vessels of the [place].   ER discussed with neurology who recommended aspirin, statin and MRI brain along with Toradol and mag  MRI brain: no acute intracranial pathology. Very mild mild, focal patchy periventricular and subcortical white matter foci of abnormal T2 and FLAIR hyperintensity are nonspecific, but usually secondary to changes of microangiopathy.    otherwise lipid panel wnl, A1C 5.4, TSH 1.8  Echo normal, EF 65%   Stroke vs complex migraine vs Bell's palsy vs MS    PLAN:  Admitted under stroke pathway  Monitor on telemetry  Permissive hypertension- holding home BP meds currently   Neurology consulted, appreciate recs; ordered MRI brain w/ contrast as well as thrombosis and autoimmune w/u  Low Dose aspirin and high intensity statin  Migraine cocktail if needed for headache    Left facial numbness  Assessment & Plan  Please see headache.     Benign essential hypertension  Assessment & Plan  Hold Home dose spironolactone and lisinopril for now given permissive hypertension           VTE Pharmacologic Prophylaxis: VTE Score: 3 Moderate Risk (Score 3-4) - Pharmacological DVT Prophylaxis Ordered: enoxaparin (Lovenox).    Mobility:   Basic Mobility Inpatient Raw Score: 24  JH-HLM Goal: 8: Walk 250 feet or more  JH-HLM Achieved: 8: Walk 250 feet ot more  JH-HLM Goal achieved. Continue to encourage appropriate mobility.    Patient Centered Rounds: I performed bedside rounds with nursing staff today.   Discussions  with Specialists or Other Care Team Provider: Neurology    Education and Discussions with Family / Patient: Patient declined call to .     Total Time Spent on Date of Encounter in care of patient: 45 mins. This time was spent on one or more of the following: performing physical exam; counseling and coordination of care; obtaining or reviewing history; documenting in the medical record; reviewing/ordering tests, medications or procedures; communicating with other healthcare professionals and discussing with patient's family/caregivers.    Current Length of Stay: 0 day(s)  Current Patient Status: Observation   Certification Statement: The patient will continue to require additional inpatient hospital stay due to r/o stroke  Discharge Plan: Anticipate discharge tomorrow to home.    Code Status: Level 1 - Full Code    Subjective:   Patient seen and examined at bedside this a.m.  Patient reports left-sided frontal head pain has resolved but continues to have numbness and tingling on the entirety of the left side of her face.  She states that she had the headache about 2 hours prior to developing the numbness and tingling on her face, has never had anything like this before and typically does not get headaches.  Also noted development of small slightly erythematous bumps on upper forehead that were not previously there.  She thought at first that she was developing shingles, had chickenpox as a kid. Denies changes to vision/sound, fevers/chills, muscle weakness, facial droop, slurred speech, N/V/D, or recent weight changes.    Objective:     Vitals:   Temp (24hrs), Av.7 °F (37.1 °C), Min:98.6 °F (37 °C), Max:99.1 °F (37.3 °C)    Temp:  [98.6 °F (37 °C)-99.1 °F (37.3 °C)] 98.7 °F (37.1 °C)  HR:  [53-91] 70  Resp:  [16-20] 19  BP: (112-160)/(55-74) 133/65  SpO2:  [94 %-100 %] 98 %  Body mass index is 31.64 kg/m².     Input and Output Summary (last 24 hours):     Intake/Output Summary (Last 24 hours) at  8/29/2024 1304  Last data filed at 8/29/2024 0900  Gross per 24 hour   Intake 640 ml   Output --   Net 640 ml       Physical Exam:   Physical Exam  Vitals reviewed.   Constitutional:       Appearance: Normal appearance. She is not ill-appearing, toxic-appearing or diaphoretic.   HENT:      Head: Normocephalic and atraumatic.      Right Ear: External ear normal.      Left Ear: External ear normal.      Nose: Nose normal.      Mouth/Throat:      Mouth: Mucous membranes are moist.      Pharynx: Oropharynx is clear.   Eyes:      Extraocular Movements: Extraocular movements intact.      Conjunctiva/sclera: Conjunctivae normal.      Pupils: Pupils are equal, round, and reactive to light.   Cardiovascular:      Rate and Rhythm: Normal rate and regular rhythm.      Pulses: Normal pulses.      Heart sounds: Normal heart sounds.   Pulmonary:      Effort: Pulmonary effort is normal.      Breath sounds: Normal breath sounds.   Abdominal:      General: Abdomen is flat. There is no distension.      Palpations: Abdomen is soft.      Tenderness: There is no abdominal tenderness.   Musculoskeletal:         General: Normal range of motion.      Cervical back: Normal range of motion and neck supple.      Right lower leg: No edema.      Left lower leg: No edema.   Lymphadenopathy:      Cervical: No cervical adenopathy.   Skin:     General: Skin is warm.      Capillary Refill: Capillary refill takes less than 2 seconds.      Coloration: Skin is not jaundiced.      Findings: No erythema.      Comments: Very small slightly erythematous but more skin-colored bumps on upper left forehead   Neurological:      General: No focal deficit present.      Mental Status: She is alert and oriented to person, place, and time. Mental status is at baseline.      Sensory: Sensory deficit present.      Motor: No weakness.      Comments: Decreased sensation on entirety of the left side of her face   Psychiatric:         Mood and Affect: Mood normal.          Behavior: Behavior normal.          Additional Data:     Labs:  Results from last 7 days   Lab Units 08/28/24  1659   WBC Thousand/uL 6.84   HEMOGLOBIN g/dL 13.8   HEMATOCRIT % 40.8   PLATELETS Thousands/uL 361   SEGS PCT % 52   LYMPHO PCT % 39   MONO PCT % 6   EOS PCT % 2     Results from last 7 days   Lab Units 08/28/24  1659   SODIUM mmol/L 135   POTASSIUM mmol/L 4.0   CHLORIDE mmol/L 103   CO2 mmol/L 25   BUN mg/dL 16   CREATININE mg/dL 0.81   ANION GAP mmol/L 7   CALCIUM mg/dL 9.9   ALBUMIN g/dL 4.6   TOTAL BILIRUBIN mg/dL 0.34   ALK PHOS U/L 53   ALT U/L 13   AST U/L 17   GLUCOSE RANDOM mg/dL 88     Results from last 7 days   Lab Units 08/28/24  1659   INR  0.89     Results from last 7 days   Lab Units 08/28/24  1753   POC GLUCOSE mg/dl 90     Results from last 7 days   Lab Units 08/28/24  1659   HEMOGLOBIN A1C % 5.4           Lines/Drains:  Invasive Devices       Peripheral Intravenous Line  Duration             Peripheral IV 08/28/24 Left Antecubital <1 day              Drain  Duration             Closed/Suction Drain Left;Midline;Inferior Abdomen Bulb 15 Fr. 993 days    Closed/Suction Drain Right;Midline;Inferior Abdomen Bulb 15 Fr. 993 days                      Telemetry:  Telemetry Orders (From admission, onward)               24 Hour Telemetry Monitoring  Continuous x 24 Hours (Telem)        Question:  Reason for 24 Hour Telemetry  Answer:  TIA/Suspected CVA/ Confirmed CVA                     Telemetry Reviewed: Normal Sinus Rhythm  Indication for Continued Telemetry Use: Acute CVA             Imaging: Reviewed radiology reports from this admission including: CT head, MRI brain, and CTA head    Recent Cultures (last 7 days):         Last 24 Hours Medication List:   Current Facility-Administered Medications   Medication Dose Route Frequency Provider Last Rate    acetaminophen  975 mg Oral Q8H [facility] Farzana Knight MD      aspirin  81 mg Oral Daily Chema Epperson MD      atorvastatin  40 mg Oral HS  Farzana Knight MD      enoxaparin  40 mg Subcutaneous Daily Farzana Knight MD      LORazepam  0.5 mg Intravenous Once PRN Phillip Jennings MD          Today, Patient Was Seen By: Kelsey Yates DO    **Please Note: This note may have been constructed using a voice recognition system.**

## 2024-08-29 NOTE — PLAN OF CARE
Problem: Knowledge Deficit  Goal: Patient/family/caregiver demonstrates understanding of disease process, treatment plan, medications, and discharge instructions  Description: Complete learning assessment and assess knowledge base.  Interventions:  - Provide teaching at level of understanding  - Provide teaching via preferred learning methods  Outcome: Progressing     Problem: Potential for Falls  Goal: Patient will remain free of falls  Description: INTERVENTIONS:  - Educate patient/family on patient safety including physical limitations  - Instruct patient to call for assistance with activity   - Consult OT/PT to assist with strengthening/mobility   - Keep Call bell within reach  - Keep bed low and locked with side rails adjusted as appropriate  - Keep care items and personal belongings within reach  - Initiate and maintain comfort rounds  - Make Fall Risk Sign visible to staff  - Offer Toileting every 2 Hours, in advance of need  - Initiate/Maintain alarm  - Obtain necessary fall risk management equipment:   - Apply yellow socks and bracelet for high fall risk patients  - Consider moving patient to room near nurses station  Outcome: Progressing

## 2024-08-29 NOTE — ASSESSMENT & PLAN NOTE
Stroke alert was called due to headache and left facial numbness which started this afternoon.  CT head: No acute intracranial abnormality.  CTA head and neck: No stenosis, dissection or occlusion of the carotid or vertebral arteries or major vessels of the [place].   ER discussed with neurology who recommended aspirin, statin and MRI brain along with Toradol and mag  MRI brain: no acute intracranial pathology. Very mild mild, focal patchy periventricular and subcortical white matter foci of abnormal T2 and FLAIR hyperintensity are nonspecific, but usually secondary to changes of microangiopathy.    otherwise lipid panel wnl, A1C 5.4, TSH 1.8  Echo normal, EF 65%   Stroke vs complex migraine vs Bell's palsy vs MS    PLAN:  Admitted under stroke pathway  Monitor on telemetry  Permissive hypertension- holding home BP meds currently   Neurology consulted, appreciate recs; ordered MRI brain w/ contrast as well as thrombosis and autoimmune w/u  Low Dose aspirin and high intensity statin  Migraine cocktail if needed for headache

## 2024-08-29 NOTE — QUICK NOTE
Stroke alert called: 553 PM  Neurology response: immediate via phone  LKW: just prior to 2 PM  NIHSS 1 for left facial numbness/ reduced sedation per ED exam relayed to me  CT H stat not acute  CTA H/N no LVO   Reviewed personally in PACS and with radiology    Acute ischemic lacunar stroke vs headache related symptomatology  TNK tPa - no low NIHSS non disabling symptoms  Recommend admit under stroke protocol   x 1 then ASA 81 mg daily. Atorvastatin daily  MRI brain routine  Toradol and IV Mag recommended for headache in ED  Further recommendations pending above

## 2024-08-29 NOTE — UTILIZATION REVIEW
Initial Clinical Review    Admission: Date/Time/Statement:   Admission Orders (From admission, onward)       Ordered        08/28/24 1842  Place in Observation  Once                          Orders Placed This Encounter   Procedures    Place in Observation     Standing Status:   Standing     Number of Occurrences:   1     Order Specific Question:   Level of Care     Answer:   Med Surg [16]     ED Arrival Information       Expected   -    Arrival   8/28/2024 16:44    Acuity   Emergent              Means of arrival   Walk-In    Escorted by   Self    Service   Hospitalist    Admission type   Emergency              Arrival complaint   Facial Numbness/Headache             Chief Complaint   Patient presents with    Headache     Headache to the L eye. 1400 started with numbness to the L side of the face. Headache has subsided but numbness worsening.        Initial Presentation: 49 y.o. female ***    Anticipated Length of Stay/Certification Statement: ***    Date: ***   Day 2: ***    ED Triage Vitals   Temperature Pulse Respirations Blood Pressure SpO2 Pain Score   08/28/24 1649 08/28/24 1652 08/28/24 1649 08/28/24 1649 08/28/24 1649 08/28/24 1800   98.7 °F (37.1 °C) 91 17 148/74 99 % 1     Weight (last 2 days)       Date/Time Weight    08/29/24 0928 73.5 (162)    08/28/24 19:30:54 73.9 (162.92)    08/28/24 1800 76.3 (168.21)            Vital Signs (last 3 days)       Date/Time Temp Pulse Resp BP MAP (mmHg) SpO2 O2 Device Patient Position - Orthostatic VS Meghan Coma Scale Score Pain    08/29/24 14:56:42 98.7 °F (37.1 °C) 83 20 137/65 89 96 % -- -- -- --    08/29/24 11:06:14 98.7 °F (37.1 °C) 70 19 133/65 88 98 % -- -- -- --    08/29/24 0928 -- 71 -- 139/72 -- -- -- -- -- --    08/29/24 0900 -- -- -- -- -- -- -- -- 15 No Pain    08/29/24 08:10:46 -- 71 -- 139/72 94 96 % -- -- -- --    08/29/24 06:29:45 -- 55 16 113/55 74 96 % -- -- -- --    08/29/24 06:10:20 -- 80 -- 112/61 78 98 % -- -- -- --    08/29/24 04:32:06 -- 53 --  113/57 76 96 % -- -- -- --    08/29/24 0430 -- 78 16 113/57 76 -- -- Lying -- --    08/29/24 0230 -- -- 16 -- -- -- -- Lying -- --    08/29/24 0030 -- 64 18 -- -- 96 % -- Lying -- --    08/28/24 2238 -- -- -- -- -- -- -- -- -- No Pain    08/28/24 22:35:23 -- 78 -- 116/59 78 94 % -- -- -- --    08/28/24 2231 -- -- -- -- -- -- -- -- -- No Pain    08/28/24 2230 98.6 °F (37 °C) -- 18 116/59 78 -- -- Lying -- --    08/28/24 21:57:55 98.7 °F (37.1 °C) 83 18 120/61 81 94 % -- -- -- --    08/28/24 20:30:13 99.1 °F (37.3 °C) 71 18 131/73 92 96 % -- -- -- --    08/28/24 19:30:54 98.6 °F (37 °C) 75 -- 141/70 94 96 % -- -- -- --    08/28/24 19:30:45 -- 79 20 141/70 94 97 % -- -- -- --    08/28/24 1930 -- -- -- -- -- -- None (Room air) -- 15 No Pain    08/28/24 1900 -- 85 18 155/68 -- 98 % -- -- 15 --    08/28/24 1845 -- 84 18 148/67 96 99 % -- -- 15 --    08/28/24 1840 -- 82 -- -- -- 99 % -- -- -- --    08/28/24 1835 -- 86 -- -- -- 100 % -- -- -- --    08/28/24 1830 -- 86 18 146/66 -- 100 % -- -- 15 --    08/28/24 1825 -- 82 -- -- -- 100 % -- -- -- --    08/28/24 1820 -- 82 -- -- -- 99 % -- -- -- --    08/28/24 1815 -- 84 18 144/67 96 100 % -- -- 15 --    08/28/24 1810 -- 88 18 160/70 101 100 % -- -- 15 --    08/28/24 1800 -- 81 18 158/66 95 99 % -- -- 15 1    08/28/24 1755 -- 91 18 145/71 102 99 % -- -- 15 --    08/28/24 1700 -- -- -- -- -- -- -- -- 15 --    08/28/24 1652 -- 91 -- -- -- -- -- -- -- --    08/28/24 1649 98.7 °F (37.1 °C) -- 17 148/74 103 99 % None (Room air) Sitting -- --              Pertinent Labs/Diagnostic Test Results:   Radiology:  MRI brain wo contrast   Final Interpretation by Sandy Vicente MD (08/28 2144)      No acute intracranial pathology.      Nonemergent findings above.         Workstation performed: [id number]         CT stroke alert brain   Final Interpretation by Leo Quesada MD (08/28 1824)      No acute intracranial abnormality.      Findings were directly discussed with Lissette  Car  at    6:20 PM .      Workstation performed: [id number]         CTA stroke alert (head/neck)   Final Interpretation by Leo Quesada MD (08/28 1823)      No stenosis, dissection or occlusion of the carotid or vertebral arteries or major vessels of the [place] of [place]..         No findings were directly discussed with Justo Schneider at  6:20 PM  .      Workstation performed: [id number]         MRI brain MS wo and w contrast    (Results Pending)     Cardiology:  Echo complete w/ contrast if indicated   Final Result by Chasidy Vanegas MD (08/29 1056)        Left Ventricle: Left ventricular cavity size is normal. Wall thickness    is normal. The left ventricular ejection fraction is 65%. Systolic    function is normal. Wall motion is normal. Diastolic function is normal.     Right Ventricle: Right ventricular cavity size is normal. Systolic    function is normal.     Atrial Septum: No patent foramen ovale detected, confirmed at rest    using agitated saline contrast, confirmed by provocation with cough and    with valsalva, using agitated saline contrast.           GI:  No orders to display       Results from last 7 days   Lab Units 08/28/24  2231   SARS-COV-2  Negative     Results from last 7 days   Lab Units 08/28/24  1659   WBC Thousand/uL 6.84   HEMOGLOBIN g/dL 13.8   HEMATOCRIT % 40.8   PLATELETS Thousands/uL 361   TOTAL NEUT ABS Thousands/µL 3.57         Results from last 7 days   Lab Units 08/28/24  1659   SODIUM mmol/L 135   POTASSIUM mmol/L 4.0   CHLORIDE mmol/L 103   CO2 mmol/L 25   ANION GAP mmol/L 7   BUN mg/dL 16   CREATININE mg/dL 0.81   EGFR ml/min/1.73sq m 86   CALCIUM mg/dL 9.9     Results from last 7 days   Lab Units 08/28/24  1659   AST U/L 17   ALT U/L 13   ALK PHOS U/L 53   TOTAL PROTEIN g/dL 8.2   ALBUMIN g/dL 4.6   TOTAL BILIRUBIN mg/dL 0.34     Results from last 7 days   Lab Units 08/28/24  1753   POC GLUCOSE mg/dl 90     Results from last 7 days   Lab Units 08/28/24  1659  "  GLUCOSE RANDOM mg/dL 88         Results from last 7 days   Lab Units 08/28/24  1659   HEMOGLOBIN A1C % 5.4   EAG mg/dl 108     No results found for: \"BETA-HYDROXYBUTYRATE\"                   Results from last 7 days   Lab Units 08/28/24  2208 08/28/24 2002 08/28/24  1659   HS TNI 0HR ng/L  --   --  20   HS TNI 2HR ng/L  --  9  --    HSTNI D2 ng/L  --  -11  --    HS TNI 4HR ng/L 9  --   --    HSTNI D4 ng/L -11  --   --          Results from last 7 days   Lab Units 08/28/24  1659   PROTIME seconds 12.7   INR  0.89   PTT seconds 33     Results from last 7 days   Lab Units 08/28/24  1659   TSH 3RD GENERATON uIU/mL 1.818                                             Results from last 7 days   Lab Units 08/29/24  1048   CRP mg/L 4.6*   SED RATE mm/hour 25*                 Results from last 7 days   Lab Units 08/28/24  2231   INFLUENZA A PCR  Negative   INFLUENZA B PCR  Negative   RSV PCR  Negative                                               ED Treatment-Medication Administration from 08/28/2024 1644 to 08/28/2024 1929         Date/Time Order Dose Route Action     08/28/2024 1806 iohexol (OMNIPAQUE) 350 MG/ML injection (MULTI-DOSE) 85 mL 85 mL Intravenous Given     08/28/2024 1912 aspirin tablet 325 mg 325 mg Oral Given     08/28/2024 1912 ketorolac (TORADOL) injection 15 mg 15 mg Intravenous Given     08/28/2024 1911 magnesium sulfate 2 g/50 mL IVPB (premix) 2 g 2 g Intravenous New Bag     08/28/2024 1912 atorvastatin (LIPITOR) tablet 40 mg 40 mg Oral Given            Past Medical History:   Diagnosis Date    Disease of thyroid gland     2014 nodules on US    Dizziness     Hypertension     Infertility, female     Miscarriage     1995, 1996, 1997    Multiple thyroid nodules     Obesity     Resolved 06/03/2016    Palpitations     Palpitations     Resolved 06/03/2016    PONV (postoperative nausea and vomiting)     Thyroid nodule     Last Assessed: 5/22/2014     Thyroid nodule 03/31/2019    Vertigo      Present on " Admission:   Benign essential hypertension      Admitting Diagnosis: Facial numbness [R20.0]  Elevated troponin [R79.89]  Left facial numbness [R20.0]  Acute headache [R51.9]  Stroke-like symptoms [R29.90]  Age/Sex: 49 y.o. female  Admission Orders:  Scheduled Medications:  acetaminophen, 975 mg, Oral, Q8H [facility]ERO  aspirin, 81 mg, Oral, Daily  atorvastatin, 40 mg, Oral, HS  enoxaparin, 40 mg, Subcutaneous, Daily      Continuous IV Infusions:     PRN Meds:       IP CONSULT TO NEUROLOGY    Network Utilization Review Department  ATTENTION: Please call with any questions or concerns to [phone] and carefully listen to the prompts so that you are directed to the right person. All voicemails are confidential.   For Discharge needs, contact Care Management DC Support Team at [phone] opt. [phone]  Send all requests for admission clinical reviews, approved or denied determinations and any other requests to dedicated fax number below belonging to the campus where the patient is receiving treatment. List of dedicated fax numbers for the Facilities:  FACILITY NAME UR FAX NUMBER   ADMISSION DENIALS (Administrative/Medical Necessity) [phone]   DISCHARGE SUPPORT TEAM (NETWORK) [phone]   PARENT CHILD HEALTH (Maternity/NICU/Pediatrics) [phone]   [facility] [phone]   [facility] [phone]   [facility] [phone]   [facility] [phone]   [facility] [phone]   [facility] [phone]   [facility] [phone]   [facility] [phone]   [facility] [phone]   [facility] [phone]   [facility] [phone]   [facility] [phone]

## 2024-08-30 VITALS
RESPIRATION RATE: 20 BRPM | DIASTOLIC BLOOD PRESSURE: 73 MMHG | HEIGHT: 60 IN | OXYGEN SATURATION: 96 % | SYSTOLIC BLOOD PRESSURE: 127 MMHG | BODY MASS INDEX: 31.8 KG/M2 | WEIGHT: 162 LBS | HEART RATE: 80 BPM | TEMPERATURE: 99 F

## 2024-08-30 LAB
ALBUMIN SERPL BCG-MCNC: 3.6 G/DL (ref 3.5–5)
ALP SERPL-CCNC: 44 U/L (ref 34–104)
ALT SERPL W P-5'-P-CCNC: 11 U/L (ref 7–52)
ANION GAP SERPL CALCULATED.3IONS-SCNC: 6 MMOL/L (ref 4–13)
AST SERPL W P-5'-P-CCNC: 10 U/L (ref 13–39)
B2 GLYCOPROT1 IGA SERPL IA-ACNC: 0.8
B2 GLYCOPROT1 IGG SERPL IA-ACNC: <0.8
B2 GLYCOPROT1 IGM SERPL IA-ACNC: <2.4
BASOPHILS # BLD AUTO: 0.04 THOUSANDS/ÂΜL (ref 0–0.1)
BASOPHILS NFR BLD AUTO: 1 % (ref 0–1)
BILIRUB SERPL-MCNC: 0.27 MG/DL (ref 0.2–1)
BUN SERPL-MCNC: 18 MG/DL (ref 5–25)
CALCIUM SERPL-MCNC: 9 MG/DL (ref 8.4–10.2)
CARDIOLIPIN IGA SER IA-ACNC: 2
CARDIOLIPIN IGG SER IA-ACNC: 1.7
CARDIOLIPIN IGM SER IA-ACNC: <0.9
CHLORIDE SERPL-SCNC: 108 MMOL/L (ref 96–108)
CO2 SERPL-SCNC: 22 MMOL/L (ref 21–32)
CREAT SERPL-MCNC: 0.77 MG/DL (ref 0.6–1.3)
DEPRECATED AT III PPP: 99 % OF NORMAL (ref 92–136)
EOSINOPHIL # BLD AUTO: 0.17 THOUSAND/ÂΜL (ref 0–0.61)
EOSINOPHIL NFR BLD AUTO: 3 % (ref 0–6)
ERYTHROCYTE [DISTWIDTH] IN BLOOD BY AUTOMATED COUNT: 11.9 % (ref 11.6–15.1)
GFR SERPL CREATININE-BSD FRML MDRD: 91 ML/MIN/1.73SQ M
GLUCOSE P FAST SERPL-MCNC: 109 MG/DL (ref 65–99)
GLUCOSE SERPL-MCNC: 109 MG/DL (ref 65–140)
HCT VFR BLD AUTO: 36.7 % (ref 34.8–46.1)
HGB BLD-MCNC: 12.1 G/DL (ref 11.5–15.4)
IMM GRANULOCYTES # BLD AUTO: 0.02 THOUSAND/UL (ref 0–0.2)
IMM GRANULOCYTES NFR BLD AUTO: 0 % (ref 0–2)
LYMPHOCYTES # BLD AUTO: 2.46 THOUSANDS/ÂΜL (ref 0.6–4.47)
LYMPHOCYTES NFR BLD AUTO: 42 % (ref 14–44)
MCH RBC QN AUTO: 29.4 PG (ref 26.8–34.3)
MCHC RBC AUTO-ENTMCNC: 33 G/DL (ref 31.4–37.4)
MCV RBC AUTO: 89 FL (ref 82–98)
MONOCYTES # BLD AUTO: 0.37 THOUSAND/ÂΜL (ref 0.17–1.22)
MONOCYTES NFR BLD AUTO: 6 % (ref 4–12)
NEUTROPHILS # BLD AUTO: 2.84 THOUSANDS/ÂΜL (ref 1.85–7.62)
NEUTS SEG NFR BLD AUTO: 48 % (ref 43–75)
NRBC BLD AUTO-RTO: 0 /100 WBCS
PLATELET # BLD AUTO: 308 THOUSANDS/UL (ref 149–390)
PMV BLD AUTO: 9.2 FL (ref 8.9–12.7)
POTASSIUM SERPL-SCNC: 4.2 MMOL/L (ref 3.5–5.3)
PROT SERPL-MCNC: 6.4 G/DL (ref 6.4–8.4)
RBC # BLD AUTO: 4.11 MILLION/UL (ref 3.81–5.12)
SODIUM SERPL-SCNC: 136 MMOL/L (ref 135–147)
WBC # BLD AUTO: 5.9 THOUSAND/UL (ref 4.31–10.16)

## 2024-08-30 PROCEDURE — 99239 HOSP IP/OBS DSCHRG MGMT >30: CPT | Performed by: INTERNAL MEDICINE

## 2024-08-30 PROCEDURE — 99232 SBSQ HOSP IP/OBS MODERATE 35: CPT | Performed by: PSYCHIATRY & NEUROLOGY

## 2024-08-30 PROCEDURE — 80053 COMPREHEN METABOLIC PANEL: CPT

## 2024-08-30 PROCEDURE — 85025 COMPLETE CBC W/AUTO DIFF WBC: CPT

## 2024-08-30 RX ORDER — ATORVASTATIN CALCIUM 40 MG/1
40 TABLET, FILM COATED ORAL DAILY
Qty: 30 TABLET | Refills: 0 | Status: SHIPPED | OUTPATIENT
Start: 2024-08-30 | End: 2024-09-29

## 2024-08-30 RX ORDER — LISINOPRIL 20 MG/1
20 TABLET ORAL DAILY
Status: DISCONTINUED | OUTPATIENT
Start: 2024-08-30 | End: 2024-08-30 | Stop reason: HOSPADM

## 2024-08-30 RX ORDER — ASPIRIN 81 MG/1
81 TABLET, CHEWABLE ORAL DAILY
Qty: 30 TABLET | Refills: 0 | Status: SHIPPED | OUTPATIENT
Start: 2024-08-31 | End: 2024-09-30

## 2024-08-30 RX ORDER — SPIRONOLACTONE 25 MG/1
25 TABLET ORAL DAILY
Status: DISCONTINUED | OUTPATIENT
Start: 2024-08-30 | End: 2024-08-30 | Stop reason: HOSPADM

## 2024-08-30 RX ADMIN — ASPIRIN 81 MG 81 MG: 81 TABLET ORAL at 09:21

## 2024-08-30 RX ADMIN — LISINOPRIL 20 MG: 20 TABLET ORAL at 09:21

## 2024-08-30 RX ADMIN — CYANOCOBALAMIN TAB 500 MCG 1000 MCG: 500 TAB at 09:21

## 2024-08-30 RX ADMIN — ENOXAPARIN SODIUM 40 MG: 40 INJECTION SUBCUTANEOUS at 09:21

## 2024-08-30 RX ADMIN — SPIRONOLACTONE 25 MG: 25 TABLET ORAL at 09:21

## 2024-08-30 NOTE — DISCHARGE SUMMARY
[facility]  Progress Note  Name: Radha Gonzalez I  MRN: [id number]  Unit/Bed#: S -01 I Date of Admission: 8/28/2024   Date of Service: 8/30/2024 I Hospital Day: 0    Assessment & Plan   * Headache  Assessment & Plan  Stroke alert was called due to headache and left facial numbness  CT head: No acute intracranial abnormality.  CTA head and neck: No stenosis, dissection or occlusion of the carotid or vertebral arteries or major vessels of the [place].   ER discussed with neurology who recommended aspirin, statin and MRI brain along with Toradol and mag  MRI brain wo: no acute intracranial pathology. Very mild mild, focal patchy periventricular and subcortical white matter foci of abnormal T2 and FLAIR hyperintensity are nonspecific, but usually secondary to changes of microangiopathy.   MRI brain wwo: Mild white matter change within the cerebral hemispheres, nonspecific for a patient of this age and unchanged compared to the prior exam. Findings may represent early chronic microangiopathic change. Other possible etiologies would include stable minor chronic demyelinating disease or sequela of other infectious/inflammatory etiology such as collagen vascular disorders. No acute intracranial abnormality.    otherwise lipid panel wnl, A1C 5.4, TSH 1.8  Echo normal, EF 65%   Suspect complex migraine vs less likely TIA vs MS    PLAN:  Follow up thrombosis panel as ordered by neuro  Continue low dose aspirin and high intensity statin  F/u with PCP    Left facial numbness  Assessment & Plan  Please see headache.     Benign essential hypertension  Assessment & Plan  Continue home dose spironolactone and lisinopril         Medical Problems       Resolved Problems  Date Reviewed: 8/29/2024   None       Discharging Physician / Practitioner: Kelsey Yates DO  PCP: Migdalia Viera DO  Admission Date:   Admission Orders (From admission, onward)       Ordered        08/28/24 7265   Place in Observation  Once                          Discharge Date: 08/30/24    Consultations During Hospital Stay:  Neurology    Procedures Performed:   None    Significant Findings / Test Results:   , mildly elevated CRP and ESR, low normal vitamin B12 at 375  MRI brain MS wo and w contrast   Final Result by George Dewitt DO (08/29 1724)      Mild white matter change within the cerebral hemispheres, nonspecific for a patient of this age and unchanged compared to the prior exam. Findings may represent early chronic microangiopathic change. Other possible etiologies would include stable minor    chronic demyelinating disease or sequela of other infectious/inflammatory etiology such as collagen vascular disorders.      No acute intracranial abnormality.      Workstation performed: [id number]         MRI brain wo contrast   Final Result by Sandy Vicente MD (08/28 2144)      No acute intracranial pathology.      Nonemergent findings above.         Workstation performed: [id number]         CT stroke alert brain   Final Result by Leo Quesada MD (08/28 1824)      No acute intracranial abnormality.      Findings were directly discussed with Lissette Yoon  at    6:20 PM .      Workstation performed: [id number]         CTA stroke alert (head/neck)   Final Result by Leo Quesada MD (08/28 1823)      No stenosis, dissection or occlusion of the carotid or vertebral arteries or major vessels of the [place] of [place]..         No findings were directly discussed with Justo Schneider at  6:20 PM  .      Workstation performed: [id number]             Incidental Findings:   None    Test Results Pending at Discharge (will require follow up):   Cardiolipin antibody, lupus anticoagulant, protein C&S activity, protein S antigen, beta-2 glycoprotein antibodies     Outpatient Tests Requested:  None    Complications: None    Reason for Admission: Headache and left facial numbness    Hospital Course:    Radha Gonzalez is a 49 y.o. female patient with past medical history of hypertension who originally presented to the hospital on 8/28/2024 due to left frontal headache with associated left facial numbness and tingling 2 hours after onset of headache.  Stroke alert was called.  CTA stroke alert negative.  CT brain negative.  MRI brain without contrast showing hyperintense FLAIR.  Patient started on low-dose aspirin and atorvastatin given elevated LDL.  Echo normal.  Neurology consulted and recommended brain with and without contrast due to possible concern of acute MS.  MRI brain with and without contrast showed mild white matter changes within the cerebral hemispheres.  Thrombosis and autoimmune workup initiated by neuro.  ESR and CRP slightly elevated, JAYLAN negative.  Thrombosis panel still pending.  Vitamin B12 low to normal, given vitamin B12 1000 mcg daily.  Patient had improved numbness and tingling and without headache.  Neuro not concerned for acute MS or acute pathology.  Suspect symptoms related to complex migraine.  Patient was stable for discharge and discharged home.    Please see above list of diagnoses and related plan for additional information.     Condition at Discharge: good    Discharge Day Visit / Exam:   Subjective: Patient examined at bedside this a.m.  Denies headache, and other neurological deficits.  States she only has some continued numbness on the left forehead which has improved since yesterday.  Patient no longer has tingling.  Feels back to her baseline.    Vitals: Blood Pressure: 127/73 (08/30/24 1038)  Pulse: 80 (08/30/24 0802)  Temperature: 99 °F (37.2 °C) (08/30/24 0802)  Temp Source: Oral (08/28/24 2230)  Respirations: 20 (08/29/24 1456)  Height: 5' (152.4 cm) (08/29/24 0928)  Weight - Scale: 73.5 kg (162 lb) (08/29/24 0928)  SpO2: 96 % (08/30/24 0802)    Exam:   Physical Exam  Vitals reviewed.   Constitutional:       Appearance: Normal appearance. She is not ill-appearing,  toxic-appearing or diaphoretic.   HENT:      Head: Normocephalic and atraumatic.      Right Ear: External ear normal.      Left Ear: External ear normal.      Nose: Nose normal.      Mouth/Throat:      Mouth: Mucous membranes are moist.      Pharynx: Oropharynx is clear.   Eyes:      Extraocular Movements: Extraocular movements intact.      Conjunctiva/sclera: Conjunctivae normal.      Pupils: Pupils are equal, round, and reactive to light.   Cardiovascular:      Rate and Rhythm: Normal rate and regular rhythm.      Pulses: Normal pulses.      Heart sounds: Normal heart sounds.   Pulmonary:      Effort: Pulmonary effort is normal.      Breath sounds: Normal breath sounds.   Abdominal:      General: Abdomen is flat.      Palpations: Abdomen is soft.   Musculoskeletal:         General: Normal range of motion.      Cervical back: Normal range of motion and neck supple.      Right lower leg: No edema.      Left lower leg: No edema.   Skin:     General: Skin is warm.      Capillary Refill: Capillary refill takes less than 2 seconds.      Coloration: Skin is not jaundiced.      Findings: No erythema.   Neurological:      General: No focal deficit present.      Mental Status: She is alert and oriented to person, place, and time. Mental status is at baseline.      Sensory: Sensory deficit present.      Comments: Decreased sensation to V1 and V2 on left side of face, no longer V3   Psychiatric:         Mood and Affect: Mood normal.         Behavior: Behavior normal.         Discussion with Family: Updated  (relative) at bedside.    Discharge instructions/Information to patient and family:   See after visit summary for information provided to patient and family.      Provisions for Follow-Up Care:  See after visit summary for information related to follow-up care and any pertinent home health orders.      Mobility at time of Discharge:   Basic Mobility Inpatient Raw Score: 24  -[facility] Goal: 8: Walk 250 feet or  more  [facility]-HLM Achieved: 8: Walk 250 feet ot more  HLM Goal achieved. Continue to encourage appropriate mobility.     Disposition:   Home    Planned Readmission: No     Discharge Statement:  I spent 45 minutes discharging the patient. This time was spent on the day of discharge. I had direct contact with the patient on the day of discharge. Greater than 50% of the total time was spent examining patient, answering all patient questions, arranging and discussing plan of care with patient as well as directly providing post-discharge instructions.  Additional time then spent on discharge activities.    Discharge Medications:  See after visit summary for reconciled discharge medications provided to patient and/or family.      **Please Note: This note may have been constructed using a voice recognition system**

## 2024-08-30 NOTE — ASSESSMENT & PLAN NOTE
Stroke alert was called due to headache and left facial numbness  CT head: No acute intracranial abnormality.  CTA head and neck: No stenosis, dissection or occlusion of the carotid or vertebral arteries or major vessels of the [place] of [place].   ER discussed with neurology who recommended aspirin, statin and MRI brain along with Toradol and mag  MRI brain wo: no acute intracranial pathology. Very mild mild, focal patchy periventricular and subcortical white matter foci of abnormal T2 and FLAIR hyperintensity are nonspecific, but usually secondary to changes of microangiopathy.   MRI brain wwo: Mild white matter change within the cerebral hemispheres, nonspecific for a patient of this age and unchanged compared to the prior exam. Findings may represent early chronic microangiopathic change. Other possible etiologies would include stable minor chronic demyelinating disease or sequela of other infectious/inflammatory etiology such as collagen vascular disorders. No acute intracranial abnormality.    otherwise lipid panel wnl, A1C 5.4, TSH 1.8  Echo normal, EF 65%   Suspect complex migraine vs less likely TIA vs MS    PLAN:  Follow up thrombosis panel as ordered by neuro  Continue low dose aspirin and high intensity statin  F/u with PCP

## 2024-08-30 NOTE — PROGRESS NOTES
NEUROLOGY RESIDENCY PROGRESS NOTE     Name: Radha Gonzalez   Age & Sex: 49 y.o. female   MRN: [id number]  Unit/Bed#: S -01   Encounter: [id number]      ASSESSMENT & PLAN     Left facial numbness  Assessment & Plan  This is a 49 year old female who presented to the ED following an sudden onset headache  and subsequent worsening left facial numbness. MRI with contrast completed, results below     Results:    MRI with contrast:  Mild white matter change within the cerebral hemispheres, nonspecific for a patient of this age and unchanged compared to the prior exam. Findings may represent early chronic microangiopathic change. Other possible etiologies would include stable minor   chronic demyelinating disease or sequela of other infectious/inflammatory etiology such as collagen vascular disorders.     No acute intracranial abnormality.       Impression:  No concern for acute MS flare or other acute pathology at this time. Patient's numbness is continuing to improve. Neurology will sign off.    Plan:  Patient appears stable for discharge from a neurological standpoint  Follow up on thrombosis panel due to patient being on Nuvaring  Recommend following up B12 studies in outpatient setting for possible deficiency  Recommend continuing statin therapy in outpatient due to hyperlipidemia during hospital stay  - Medical management and supportive care per primary team.             SUBJECTIVE     Patient was seen and examined. No acute events overnight. Patient feels well this morning. She is denying any neurological deficits. Review of systems negative for chest pain, abdominal pain, nausea, vomiting, numbness, weakness, speech or visual changes. She has no complaints at present.        Review of Systems   Neurological:  Negative for dizziness, tremors, seizures, syncope, facial asymmetry, speech difficulty, weakness, light-headedness, numbness and headaches.       OBJECTIVE     Patient ID: Radha Gonzalez is a 49 y.o.  female.    Vitals:    24 1106 24 1456 24 2149 24 0802   BP: 133/65 137/65 131/73 124/64   BP Location:       Pulse: 70 83 70 80   Resp: 19 20     Temp: 98.7 °F (37.1 °C) 98.7 °F (37.1 °C) 98.6 °F (37 °C) 99 °F (37.2 °C)   TempSrc:       SpO2: 98% 96% 96% 96%   Weight:       Height:          Temperature:   Temp (24hrs), Av.8 °F (37.1 °C), Min:98.6 °F (37 °C), Max:99 °F (37.2 °C)    Temperature: 99 °F (37.2 °C)      Physical Exam  Constitutional:       Appearance: Normal appearance.   HENT:      Head: Normocephalic and atraumatic.   Eyes:      Extraocular Movements: Extraocular movements intact and EOM normal.      Pupils: Pupils are equal, round, and reactive to light.   Cardiovascular:      Rate and Rhythm: Normal rate and regular rhythm.   Pulmonary:      Effort: Pulmonary effort is normal.   Neurological:      Mental Status: She is alert and oriented to person, place, and time. Mental status is at baseline.      Motor: Motor strength is normal.     Coordination: Finger-Nose-Finger Test and Heel to Shin Test normal.      Gait: Gait is intact.      Deep Tendon Reflexes:      Reflex Scores:       Tricep reflexes are 2+ on the right side and 2+ on the left side.       Bicep reflexes are 2+ on the right side and 2+ on the left side.       Brachioradialis reflexes are 2+ on the right side and 2+ on the left side.       Patellar reflexes are 2+ on the right side and 2+ on the left side.       Achilles reflexes are 2+ on the right side and 2+ on the left side.  Psychiatric:         Speech: Speech normal.          Neurologic Exam     Mental Status   Oriented to person, place, and time.   Attention: normal. Concentration: normal.   Speech: speech is normal   Level of consciousness: alert  Knowledge: good.   Able to name object. Able to read. Able to repeat. Able to write. Normal comprehension.     Cranial Nerves     CN II   Visual fields full to confrontation.     CN III, IV, VI   Pupils are  equal, round, and reactive to light.  Extraocular motions are normal.     CN V   Facial sensation intact.     CN VII   Facial expression full, symmetric.     CN VIII   CN VIII normal.     CN IX, X   CN IX normal.   CN X normal.     CN XI   CN XI normal.     CN XII   CN XII normal.     Motor Exam   Muscle bulk: normal  Overall muscle tone: normal    Strength   Strength 5/5 throughout.     Sensory Exam   Light touch normal.   Vibration normal.   Proprioception normal.   Pinprick normal.     Gait, Coordination, and Reflexes     Gait  Gait: normal    Coordination   Finger to nose coordination: normal  Heel to shin coordination: normal    Tremor   Resting tremor: absent  Intention tremor: absent  Action tremor: absent    Reflexes   Right brachioradialis: 2+  Left brachioradialis: 2+  Right biceps: 2+  Left biceps: 2+  Right triceps: 2+  Left triceps: 2+  Right patellar: 2+  Left patellar: 2+  Right achilles: 2+  Left achilles: 2+  Right : 2+  Left : 2+           LABORATORY DATA     Labs: I have personally reviewed pertinent reports.    Results from last 7 days   Lab Units 08/30/24  0517 08/28/24  1659   WBC Thousand/uL 5.90 6.84   HEMOGLOBIN g/dL 12.1 13.8   HEMATOCRIT % 36.7 40.8   PLATELETS Thousands/uL 308 361   SEGS PCT % 48 52   MONO PCT % 6 6   EOS PCT % 3 2      Results from last 7 days   Lab Units 08/30/24  0517 08/28/24  1659   SODIUM mmol/L 136 135   POTASSIUM mmol/L 4.2 4.0   CHLORIDE mmol/L 108 103   CO2 mmol/L 22 25   BUN mg/dL 18 16   CREATININE mg/dL 0.77 0.81   CALCIUM mg/dL 9.0 9.9   ALK PHOS U/L 44 53   ALT U/L 11 13   AST U/L 10* 17              Results from last 7 days   Lab Units 08/28/24  1659   INR  0.89   PTT seconds 33               IMAGING & DIAGNOSTIC TESTING     Radiology Results: I have personally reviewed pertinent reports.      MRI brain MS wo and w contrast   Final Result by George Dewitt DO (08/29 1724)      Mild white matter change within the cerebral hemispheres,  nonspecific for a patient of this age and unchanged compared to the prior exam. Findings may represent early chronic microangiopathic change. Other possible etiologies would include stable minor    chronic demyelinating disease or sequela of other infectious/inflammatory etiology such as collagen vascular disorders.      No acute intracranial abnormality.      Workstation performed: [id number]         MRI brain wo contrast   Final Result by Sandy Vicente MD (08/28 2144)      No acute intracranial pathology.      Nonemergent findings above.         Workstation performed: [id number]         CT stroke alert brain   Final Result by Leo Quesada MD (08/28 1824)      No acute intracranial abnormality.      Findings were directly discussed with Lissette Yoon  at    6:20 PM .      Workstation performed: [id number]         CTA stroke alert (head/neck)   Final Result by Leo Quesada MD (08/28 1823)      No stenosis, dissection or occlusion of the carotid or vertebral arteries or major vessels of the [place] of [place]..         No findings were directly discussed with Justo Schneider at  6:20 PM  .      Workstation performed: [id number]             Other Diagnostic Testing: I have personally reviewed pertinent reports.      ACTIVE MEDICATIONS     Current Facility-Administered Medications   Medication Dose Route Frequency    acetaminophen (TYLENOL) tablet 975 mg  975 mg Oral Q8H OMERO    aspirin chewable tablet 81 mg  81 mg Oral Daily    atorvastatin (LIPITOR) tablet 40 mg  40 mg Oral HS    cyanocobalamin (VITAMIN B-12) tablet 1,000 mcg  1,000 mcg Oral Daily    enoxaparin (LOVENOX) subcutaneous injection 40 mg  40 mg Subcutaneous Daily    lisinopril (ZESTRIL) tablet 20 mg  20 mg Oral Daily    spironolactone (ALDACTONE) tablet 25 mg  25 mg Oral Daily       Prior to Admission medications    Medication Sig Start Date End Date Taking? Authorizing Provider   Ascorbic Acid (vitamin C) 100 MG tablet Take 100 mg by mouth  daily    Historical Provider, MD   cyanocobalamin (VITAMIN B-12) 250 MCG tablet Take 250 mcg by mouth daily    Historical Provider, MD   ergocalciferol (VITAMIN D2) 50,000 units Take 1 capsule (50,000 Units total) by mouth once a week Long term. 2/23/24   Migdalia Viera DO   estradiol (ESTRACE VAGINAL) 0.1 mg/g vaginal cream Insert 1 g into the vagina 3 (three) times a week  Patient taking differently: Insert 1 g into the vagina as needed 3/8/24   Vida Monreal MD   etonogestrel-ethinyl estradiol (NuvaRing) 0.12-0.015 MG/24HR vaginal ring Change every 3 weeks. 7/3/24   Vida Monreal MD   folic acid (FOLVITE) 1 mg tablet Take 400 mcg by mouth daily    Historical Provider, MD   lisinopril (ZESTRIL) 20 mg tablet TAKE ONE TABLET BY MOUTH DAILY 5/31/24   Migdalia Viera DO   Omega-3 Fatty Acids (fish oil) 1,000 mg Take 1,000 mg by mouth daily    Historical Provider, MD   spironolactone (ALDACTONE) 25 mg tablet TAKE ONE TABLET BY MOUTH DAILY 5/31/24   Migdalia Viera DO   VITAMIN K PO Take 600 mcg by mouth in the morning    Historical Provider, MD         VTE Pharmacologic Prophylaxis: VTE covered by:  enoxaparin, Subcutaneous, 40 mg at 08/30/24 0921      VTE Mechanical Prophylaxis: None. Covered by pharmacologic prophylaxis    ======    I have discussed the patient's history, physical exam findings, assessment, and plan in detail with attending, Dr. Yoon    Thank you for allowing me to participate in the care of your patient, Radha Gonzalez.    Phillip Jennings MD  [facility] Neurology Residency, PGY-1

## 2024-08-30 NOTE — DISCHARGE INSTR - AVS FIRST PAGE
Dear Radha Gonzalez,     It was our pleasure to care for you here at [facility].  It is our hope that we were always able to exceed the expected standards for your care during your stay.  You were hospitalized due to headache and left facial numbness.  You were cared for on the 3rd floor by Kelsey Yates DO under the service of Jessica Faustin MD with the [facility] who covers for your primary care physician (PCP), Migdalia Viera DO, while you were hospitalized.  If you have any questions or concerns related to this hospitalization, you may contact us at .  For follow up as well as any medication refills, we recommend that you follow up with your primary care physician.  A registered nurse will reach out to you by phone within a few days after your discharge to answer any additional questions that you may have after going home.  However, at this time we provide for you here, the most important instructions / recommendations at discharge:     Notable Medication Adjustments -   Start taking atorvastatin 40 mg daily  Start taking vitamin B12 1000 mcg daily, follow up with your PCP about this  Start taking low dose aspirin daily, f/u with PCP  Testing Required after Discharge -   None  Important follow up information -   Please follow up with your PCP within one week of discharge  Other Instructions -   None  Please review this entire after visit summary as additional general instructions including medication list, appointments, activity, diet, any pertinent wound care, and other additional recommendations from your care team that may be provided for you.      Sincerely,     Kelsey Yates DO

## 2024-08-30 NOTE — PLAN OF CARE
Problem: Knowledge Deficit  Goal: Patient/family/caregiver demonstrates understanding of disease process, treatment plan, medications, and discharge instructions  Description: Complete learning assessment and assess knowledge base.  Interventions:  - Provide teaching at level of understanding  - Provide teaching via preferred learning methods  Outcome: Progressing     Problem: Potential for Falls  Goal: Patient will remain free of falls  Description: INTERVENTIONS:  - Educate patient/family on patient safety including physical limitations  - Instruct patient to call for assistance with activity   - Consult OT/PT to assist with strengthening/mobility   - Keep Call bell within reach  - Keep bed low and locked with side rails adjusted as appropriate  - Keep care items and personal belongings within reach  - Initiate and maintain comfort rounds  - Make Fall Risk Sign visible to staff  - Offer Toileting every 2 Hours, in advance of need  - Initiate/Maintain alarm  - Obtain necessary fall risk management equipment:   - Apply yellow socks and bracelet for high fall risk patients  - Consider moving patient to room near nurses station  Outcome: Progressing     Problem: NEUROSENSORY - ADULT  Goal: Achieves stable or improved neurological status  Description: INTERVENTIONS  - Monitor and report changes in neurological status  - Monitor vital signs such as temperature, blood pressure, glucose, and any other labs ordered   - Initiate measures to prevent increased intracranial pressure  - Monitor for seizure activity and implement precautions if appropriate      Outcome: Progressing  Goal: Remains free of injury related to seizures activity  Description: INTERVENTIONS  - Maintain airway, patient safety  and administer oxygen as ordered  - Monitor patient for seizure activity, document and report duration and description of seizure to physician/advanced practitioner  - If seizure occurs,  ensure patient safety during seizure  -  Reorient patient post seizure  - Seizure pads on all 4 side rails  - Instruct patient/family to notify RN of any seizure activity including if an aura is experienced  - Instruct patient/family to call for assistance with activity based on nursing assessment  - Administer anti-seizure medications if ordered    Outcome: Progressing  Goal: Achieves maximal functionality and self care  Description: INTERVENTIONS  - Monitor swallowing and airway patency with patient fatigue and changes in neurological status  - Encourage and assist patient to increase activity and self care.   - Encourage visually impaired, hearing impaired and aphasic patients to use assistive/communication devices  Outcome: Progressing

## 2024-08-31 LAB
APTT HEX PL PPP: 0 SEC (ref 0–11)
APTT SCREEN TO CONFIRM RATIO: 1.11 RATIO (ref 0–1.34)
APTT-LA IMM 4:1 NP PPP: 49.8 SEC (ref 0–40.5)
CONFIRM APTT/NORMAL: 36.6 SEC (ref 0–47.6)
LA PPP-IMP: ABNORMAL
PROT S ACT/NOR PPP: 90 % (ref 61–136)
PROT S PPP-ACNC: 73 % (ref 60–150)
SCREEN APTT: 57.2 SEC (ref 0–43.5)
SCREEN DRVVT: 38.7 SEC (ref 0–47)
THROMBIN TIME: 18.4 SEC (ref 0–23)

## 2024-09-02 LAB
PROT C AG ACT/NOR PPP IA: 122 % OF NORMAL (ref 60–150)
PROT S ACT/NOR PPP: 74 % (ref 68–108)

## 2024-09-03 ENCOUNTER — TRANSITIONAL CARE MANAGEMENT (OUTPATIENT)
Dept: FAMILY MEDICINE CLINIC | Facility: CLINIC | Age: 49
End: 2024-09-03

## 2024-09-03 ENCOUNTER — TELEPHONE (OUTPATIENT)
Age: 49
End: 2024-09-03

## 2024-09-04 ENCOUNTER — TELEPHONE (OUTPATIENT)
Dept: FAMILY MEDICINE CLINIC | Facility: CLINIC | Age: 49
End: 2024-09-04

## 2024-09-04 NOTE — TELEPHONE ENCOUNTER
Called and spoke with patient. Offered patient appointment on 9/17/24. Patient unable to take appointment due to work schedule. Notified patient we will ask Dr. Viera tomorrow for suggestions and call back tomorrow. Patient noted they work half days on Fridays.

## 2024-09-05 ENCOUNTER — TELEPHONE (OUTPATIENT)
Age: 49
End: 2024-09-05

## 2024-09-05 LAB
ATRIAL RATE: 90 BPM
P AXIS: 81 DEGREES
PR INTERVAL: 154 MS
QRS AXIS: 80 DEGREES
QRSD INTERVAL: 82 MS
QT INTERVAL: 358 MS
QTC INTERVAL: 437 MS
T WAVE AXIS: 72 DEGREES
VENTRICULAR RATE: 90 BPM

## 2024-09-05 NOTE — TELEPHONE ENCOUNTER
Can she come in during lunch Friday 9/13? You can put her in at 11:40 am for a TCM appointment if she can make it in by 12:30 pm or so?

## 2024-09-05 NOTE — TELEPHONE ENCOUNTER
PER PT, FRIDAY AFTERNOONS ONLY UNTIL NOVEMBER THEN SHE CAN DO ANY FRIDAY AT ANY TIME.    PLACED ON WAITING LIST AND SCHEDULED WITH CHICHO MANCINIH, 12/20/2024, 2PM. LEDYU SAYDA VILLAFANAU/ [facility] BIRD/ Acute ischemic lacunar stroke vs headache related symptomatology     DC- HOME- 8/30/2024    ----- Message from Lisbet Moon DO sent at 8/30/2024  9:53 AM EDT -----  Radha Gonzalez will need follow-up in 6-8 weeks with general neurology team for Other= ATTENDING ONLY  in 60 minute appointment. Follow-up on thrombosis panel and B12. They will not require outpatient neurological testing.

## 2024-09-05 NOTE — TELEPHONE ENCOUNTER
Called and spoke with patient. Patient scheduled for 9/13/24. Patient knows to come around 12:30pm per Dr. Viera. Appointment scheduled in 11:40am time slot as requested by Dr. Viera.

## 2024-09-06 DIAGNOSIS — E87.6 HYPOKALEMIA: ICD-10-CM

## 2024-09-06 DIAGNOSIS — I10 BENIGN ESSENTIAL HYPERTENSION: ICD-10-CM

## 2024-09-06 RX ORDER — LISINOPRIL 20 MG/1
20 TABLET ORAL DAILY
Qty: 90 TABLET | Refills: 1 | Status: SHIPPED | OUTPATIENT
Start: 2024-09-06

## 2024-09-06 RX ORDER — SPIRONOLACTONE 25 MG/1
25 TABLET ORAL DAILY
Qty: 90 TABLET | Refills: 1 | Status: SHIPPED | OUTPATIENT
Start: 2024-09-06

## 2024-09-13 ENCOUNTER — PATIENT MESSAGE (OUTPATIENT)
Dept: FAMILY MEDICINE CLINIC | Facility: CLINIC | Age: 49
End: 2024-09-13

## 2024-09-13 ENCOUNTER — OFFICE VISIT (OUTPATIENT)
Dept: FAMILY MEDICINE CLINIC | Facility: CLINIC | Age: 49
End: 2024-09-13
Payer: COMMERCIAL

## 2024-09-13 VITALS
RESPIRATION RATE: 16 BRPM | TEMPERATURE: 97.9 F | SYSTOLIC BLOOD PRESSURE: 126 MMHG | WEIGHT: 160 LBS | OXYGEN SATURATION: 99 % | HEIGHT: 60 IN | DIASTOLIC BLOOD PRESSURE: 80 MMHG | BODY MASS INDEX: 31.41 KG/M2 | HEART RATE: 87 BPM

## 2024-09-13 DIAGNOSIS — R20.2 NUMBNESS AND TINGLING OF LEFT SIDE OF FACE: Primary | ICD-10-CM

## 2024-09-13 DIAGNOSIS — N95.2 ATROPHIC VAGINITIS: ICD-10-CM

## 2024-09-13 DIAGNOSIS — G43.109 COMPLICATED MIGRAINE: ICD-10-CM

## 2024-09-13 DIAGNOSIS — E53.8 B12 DEFICIENCY: ICD-10-CM

## 2024-09-13 DIAGNOSIS — R20.0 NUMBNESS AND TINGLING OF LEFT SIDE OF FACE: Primary | ICD-10-CM

## 2024-09-13 PROCEDURE — 99495 TRANSJ CARE MGMT MOD F2F 14D: CPT | Performed by: FAMILY MEDICINE

## 2024-09-13 RX ORDER — ESTRADIOL 0.1 MG/G
1 CREAM VAGINAL 3 TIMES WEEKLY
Status: SHIPPED
Start: 2024-09-13

## 2024-09-13 NOTE — PROGRESS NOTES
Assessment/Plan:     Assessment & Plan  Numbness and tingling of left side of face  See below  Orders:    Varicella zoster antibody, IgG; Future    Varicella zoster antibody, IgM; Future    Lyme Total AB W Reflex to IGM/IGG; Future    Sedimentation rate, automated; Future    C-reactive protein; Future    Thrombosis Panel; Future    Complicated migraine  See below       B12 deficiency  Patient now on over-the-counter B12.       Atrophic vaginitis  Medication list updated only.  Orders:    estradiol (ESTRACE VAGINAL) 0.1 mg/g vaginal cream; Insert 1 g into the vagina 3 (three) times a week As needed for dryness.       Patient admitted for numbness and tingling on left side of face.  Concluded to be complex migraine.  Patient seen by neurology who determined this was not a stroke.  They did advise her to stay on the statin.  I advised her her ASCVD risk is 1.2%, and therefore she should stop the statin..  Workup found to be negative other than PTT-LA MIX .  Will repeat this blood work in a few weeks.  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  I will also order a Lyme titer.    No follow-ups on file.    Subjective:   Radha is a 49 y.o. female here today for a hospital follow-up.  Patient Active Problem List   Diagnosis    Dense breast tissue    Family history of breast cancer    Thyroid nodule    Vitamin D deficiency    Class 1 obesity due to excess calories with serious comorbidity and body mass index (BMI) of 30.0 to 30.9 in adult    Low vitamin D level    Rectocele    Benign essential hypertension    Multiple thyroid nodules    Headache    Left facial numbness        Current medications:  Current Outpatient Medications   Medication Sig Dispense Refill    Ascorbic Acid (vitamin C) 100 MG tablet Take 100 mg by mouth daily      aspirin 81 mg chewable tablet Chew 1 tablet (81 mg total) daily 30 tablet 0    atorvastatin (LIPITOR)  40 mg tablet Take 1 tablet (40 mg total) by mouth daily 30 tablet 0    cyanocobalamin (VITAMIN B-12) 1000 MCG tablet Take 1 tablet (1,000 mcg total) by mouth daily 30 tablet 0    ergocalciferol (VITAMIN D2) 50,000 units Take 1 capsule (50,000 Units total) by mouth once a week Long term. 12 capsule 2    estradiol (ESTRACE VAGINAL) 0.1 mg/g vaginal cream Insert 1 g into the vagina 3 (three) times a week As needed for dryness.      etonogestrel-ethinyl estradiol (NuvaRing) 0.12-0.015 MG/24HR vaginal ring Change every 3 weeks. 4 each 3    folic acid (FOLVITE) 1 mg tablet Take 400 mcg by mouth daily      lisinopril (ZESTRIL) 20 mg tablet TAKE ONE TABLET BY MOUTH DAILY 90 tablet 1    Omega-3 Fatty Acids (fish oil) 1,000 mg Take 1,000 mg by mouth daily      spironolactone (ALDACTONE) 25 mg tablet TAKE ONE TABLET BY MOUTH DAILY 90 tablet 1    VITAMIN K PO Take 600 mcg by mouth in the morning       No current facility-administered medications for this visit.       HPI:   Chief Complaint   Patient presents with    Transition of Care Management     D/c 8/30/24. Pt c/o persistent burning/tingling sensation that starts at the crown of the head and radiates down through the L forehead and L eye.      -- Above per clinical staff and reviewed. --    TCM Call       Date and time call was made  9/3/2024  9:43 AM    Hospital care reviewed  Records reviewed    Patient was hospitialized at  [facility]    Date of Admission  08/28/24    Date of discharge  08/30/24    Diagnosis  headache; L sided facial numbness    Disposition  Home          TCM Call       Should patient be enrolled in anticoag monitoring?  No            Hospital Course:    8/28/2024 - 8/30/2024 (2 days)  [facility]       Discharge Summary  Kelsey Yates DO (Resident)  Hospitalist  Cosigned by: Jessica Faustin MD at 8/30/2024  9:36 PM       Attestation signed by Jessica Faustin MD at 8/30/2024  9:36 PM     Discharge  Summary Attending Attestation     I was the supervising physician and personally saw/examined the patient on 8/30/2024.  I agree with the Resident/Fellow's note with the following additions/exceptions:      49-year-old female with PMHx of HTN presented with headache and left facial numbness and tingling. MRI brain with and without contrast no active demyelinating disease or stroke.  Will start vitamin B12 supplement for low normal vitamin B12 level.  Okay for discharge home today.                  Expand All Collapse All  [facility]  Progress Note  Name: Radha Gonzalez I  MRN: [id number]  Unit/Bed#: S -01 I Date of Admission: 8/28/2024   Date of Service: 8/30/2024 I Hospital Day: 0        Assessment & Plan  * Headache  Assessment & Plan  Stroke alert was called due to headache and left facial numbness  CT head: No acute intracranial abnormality.  CTA head and neck: No stenosis, dissection or occlusion of the carotid or vertebral arteries or major vessels of the [place].   ER discussed with neurology who recommended aspirin, statin and MRI brain along with Toradol and mag  MRI brain wo: no acute intracranial pathology. Very mild mild, focal patchy periventricular and subcortical white matter foci of abnormal T2 and FLAIR hyperintensity are nonspecific, but usually secondary to changes of microangiopathy.   MRI brain wwo: Mild white matter change within the cerebral hemispheres, nonspecific for a patient of this age and unchanged compared to the prior exam. Findings may represent early chronic microangiopathic change. Other possible etiologies would include stable minor chronic demyelinating disease or sequela of other infectious/inflammatory etiology such as collagen vascular disorders. No acute intracranial abnormality.    otherwise lipid panel wnl, A1C 5.4, TSH 1.8  Echo normal, EF 65%   Suspect complex migraine vs less likely TIA vs MS     PLAN:  Follow up thrombosis  panel as ordered by neuro  Continue low dose aspirin and high intensity statin  F/u with PCP     Left facial numbness  Assessment & Plan  Please see headache.      Benign essential hypertension  Assessment & Plan  Continue home dose spironolactone and lisinopril              Medical Problems         Resolved Problems  Date Reviewed: 8/29/2024   None         Discharging Physician / Practitioner: Kelsey Yates DO  PCP: Migdalia Viera DO  Admission Date:   Admission Orders (From admission, onward)          Ordered         08/28/24 1842   Place in Observation  Once                               Discharge Date: 08/30/24     Consultations During Hospital Stay:  Neurology     Procedures Performed:   None     Significant Findings / Test Results:   , mildly elevated CRP and ESR, low normal vitamin B12 at 375  MRI brain MS wo and w contrast   Final Result by George Dewitt DO (08/29 1724)       Mild white matter change within the cerebral hemispheres, nonspecific for a patient of this age and unchanged compared to the prior exam. Findings may represent early chronic microangiopathic change. Other possible etiologies would include stable minor    chronic demyelinating disease or sequela of other infectious/inflammatory etiology such as collagen vascular disorders.       No acute intracranial abnormality.       Workstation performed: [id number]           MRI brain wo contrast   Final Result by Sandy Vicente MD (08/28 2144)       No acute intracranial pathology.       Nonemergent findings above.           Workstation performed: [id number]           CT stroke alert brain   Final Result by Leo Quesada MD (08/28 1824)       No acute intracranial abnormality.       Findings were directly discussed with Lissette Yoon  at    6:20 PM .       Workstation performed: [id number]           CTA stroke alert (head/neck)   Final Result by Leo Quesada MD (08/28 1823)       No stenosis, dissection or  occlusion of the carotid or vertebral arteries or major vessels of the [place]..           No findings were directly discussed with Justo Schneider at  6:20 PM  .       Workstation performed: [id number]                 Incidental Findings:   None     Test Results Pending at Discharge (will require follow up):   Cardiolipin antibody, lupus anticoagulant, protein C&S activity, protein S antigen, beta-2 glycoprotein antibodies     Outpatient Tests Requested:  None     Complications: None     Reason for Admission: Headache and left facial numbness     Hospital Course:   Radha Gonzalez is a 49 y.o. female patient with past medical history of hypertension who originally presented to the hospital on 8/28/2024 due to left frontal headache with associated left facial numbness and tingling 2 hours after onset of headache.  Stroke alert was called.  CTA stroke alert negative.  CT brain negative.  MRI brain without contrast showing hyperintense FLAIR.  Patient started on low-dose aspirin and atorvastatin given elevated LDL.  Echo normal.  Neurology consulted and recommended brain with and without contrast due to possible concern of acute MS.  MRI brain with and without contrast showed mild white matter changes within the cerebral hemispheres.  Thrombosis and autoimmune workup initiated by neuro.  ESR and CRP slightly elevated, JAYLAN negative.  Thrombosis panel still pending.  Vitamin B12 low to normal, given vitamin B12 1000 mcg daily.  Patient had improved numbness and tingling and without headache.  Neuro not concerned for acute MS or acute pathology.  Suspect symptoms related to complex migraine.  Patient was stable for discharge and discharged home.     Please see above list of diagnoses and related plan for additional information.              Today:    She was at work and had a jolt   Then numbness above eyebrow then rest of face   Outside edge of left side on d/c   Annoying burning in her head  Sometims at night worse    Burning   [place] tired and not well for a week or so   Ache in head   Never had anything like this   No known tick bites  A lot of stress recently -   Father , unexpected death in the family age 50   Tingling on upper left lip and forehead  No light or noise sensitivity   Menstrual migraines in past   Mom may have had TIA. Had A.fib and had blood clots.  Father's father stroke 70s, paternal GM stroke 80     Radha presents today for hospital follow-up visit.      Patient was contacted within 2 business days.   Medications were reconciled.  Hospital discharge summary was reviewed.    As part of this post-hospital visit, records from the hospital, including history and physical examination, discharge summary, all laboratory tests and radiographic studies was reviewed with this patient.    The following portions of the patient's history were reviewed and updated as appropriate: allergies, current medications, past family history, past medical history, past social history, past surgical history and problem list.    Objective:  Vitals:  /80   Pulse 87   Temp 97.9 °F (36.6 °C)   Resp 16   Ht 5' (1.524 m)   Wt 72.6 kg (160 lb)   SpO2 99%   BMI 31.25 kg/m²    Wt Readings from Last 3 Encounters:   24 72.6 kg (160 lb)   24 73.5 kg (162 lb)   24 73.5 kg (162 lb 0.6 oz)      BP Readings from Last 3 Encounters:   24 126/80   24 127/73   24 112/74        Review of Systems   She has no other concerns. No unexpected weight changes. No chest pain, SOB, or palpitations. No GERD. No changes in bowels or bladder. Sleeping well. No mood changes.     Physical Exam   Constitutional:  she appears well-developed and well-nourished.  HENT: Head: Normocephalic.   Neck: Neck supple.   Cardiovascular: Normal rate, regular rhythm and normal heart sounds.   Pulmonary/Chest: Effort normal and breath sounds normal. No wheezes, rales, or rhonchi.   Abdominal: Soft. Bowel sounds are normal. There  is no tenderness. No hepatosplenomegaly.   Musculoskeletal: she exhibits no edema.   Lymphadenopathy: she has no cervical adenopathy.   Neurological: she is alert and oriented to person, place, and time. Cranial Nerves:  II-visual fields full.   III, IV, VI-Pupils were equal, round, and reactive to light and accomodation. Extraocular movements were full and conjugate without nystagmus.   V-facial sensation symmetric.    VII-facial expression symmetric, intact forehead wrinkle, strong eye closure, symmetric smile    VIII-hearing grossly intact bilaterally   IX, X-palate elevation symmetric, no dysarthria.   XI-shoulder shrug strength intact    Motor Exam: symmetric bulk and tone throughout, no pronator drift. Power/strength 5/5 bilateral upper and lower extremities, no atrophy, fasciculations or abnormal movements noted.   Reflexes: knee 2+bilaterally.    Coordination: Finger nose finger intact bilaterally, no apparent dysmetria, ataxia or tremor noted  Romberg negative   Skin: Skin is warm and dry.   Psychiatric: she has a normal mood and affect. her behavior is normal. Thought content normal.

## 2024-09-23 ENCOUNTER — APPOINTMENT (OUTPATIENT)
Dept: LAB | Facility: HOSPITAL | Age: 49
End: 2024-09-23
Payer: COMMERCIAL

## 2024-09-23 DIAGNOSIS — R20.0 NUMBNESS AND TINGLING OF LEFT SIDE OF FACE: ICD-10-CM

## 2024-09-23 DIAGNOSIS — I10 BENIGN ESSENTIAL HYPERTENSION: ICD-10-CM

## 2024-09-23 DIAGNOSIS — E66.09 CLASS 1 OBESITY DUE TO EXCESS CALORIES WITH SERIOUS COMORBIDITY AND BODY MASS INDEX (BMI) OF 30.0 TO 30.9 IN ADULT: ICD-10-CM

## 2024-09-23 DIAGNOSIS — R20.2 NUMBNESS AND TINGLING OF LEFT SIDE OF FACE: ICD-10-CM

## 2024-09-23 DIAGNOSIS — E04.2 MULTIPLE THYROID NODULES: ICD-10-CM

## 2024-09-23 DIAGNOSIS — E66.811 CLASS 1 OBESITY DUE TO EXCESS CALORIES WITH SERIOUS COMORBIDITY AND BODY MASS INDEX (BMI) OF 30.0 TO 30.9 IN ADULT: ICD-10-CM

## 2024-09-23 DIAGNOSIS — E55.9 VITAMIN D DEFICIENCY: ICD-10-CM

## 2024-09-23 LAB
25(OH)D3 SERPL-MCNC: 55.2 NG/ML (ref 30–100)
B BURGDOR IGG+IGM SER QL IA: NEGATIVE
DEPRECATED AT III PPP: 96 % OF NORMAL (ref 92–136)
ERYTHROCYTE [SEDIMENTATION RATE] IN BLOOD: 26 MM/HOUR (ref 0–19)
VZV IGG SER QL IA: NORMAL

## 2024-09-23 PROCEDURE — 85305 CLOT INHIBIT PROT S TOTAL: CPT

## 2024-09-23 PROCEDURE — 86146 BETA-2 GLYCOPROTEIN ANTIBODY: CPT

## 2024-09-23 PROCEDURE — 86787 VARICELLA-ZOSTER ANTIBODY: CPT

## 2024-09-23 PROCEDURE — 85303 CLOT INHIBIT PROT C ACTIVITY: CPT

## 2024-09-23 PROCEDURE — 36415 COLL VENOUS BLD VENIPUNCTURE: CPT

## 2024-09-23 PROCEDURE — 85306 CLOT INHIBIT PROT S FREE: CPT

## 2024-09-23 PROCEDURE — 82306 VITAMIN D 25 HYDROXY: CPT

## 2024-09-23 PROCEDURE — 85705 THROMBOPLASTIN INHIBITION: CPT

## 2024-09-23 PROCEDURE — 85300 ANTITHROMBIN III ACTIVITY: CPT

## 2024-09-23 PROCEDURE — 86147 CARDIOLIPIN ANTIBODY EA IG: CPT

## 2024-09-23 PROCEDURE — 86618 LYME DISEASE ANTIBODY: CPT

## 2024-09-23 PROCEDURE — 85613 RUSSELL VIPER VENOM DILUTED: CPT

## 2024-09-23 PROCEDURE — 85652 RBC SED RATE AUTOMATED: CPT

## 2024-09-23 PROCEDURE — 85670 THROMBIN TIME PLASMA: CPT

## 2024-09-23 PROCEDURE — 85732 THROMBOPLASTIN TIME PARTIAL: CPT

## 2024-09-24 LAB
PROT C AG ACT/NOR PPP IA: 142 % OF NORMAL (ref 60–150)
PROT S ACT/NOR PPP: 74 % (ref 68–108)
VZV IGM SER IA-ACNC: <0.91 INDEX (ref 0–0.9)

## 2024-09-25 LAB
APTT SCREEN TO CONFIRM RATIO: 1.16 RATIO (ref 0–1.34)
CONFIRM APTT/NORMAL: 37.1 SEC (ref 0–47.6)
LA PPP-IMP: NORMAL
PROT S ACT/NOR PPP: 88 % (ref 61–136)
PROT S PPP-ACNC: 82 % (ref 60–150)
SCREEN APTT: 41.8 SEC (ref 0–43.5)
SCREEN DRVVT: 33.5 SEC (ref 0–47)
THROMBIN TIME: 17.2 SEC (ref 0–23)

## 2024-09-27 LAB
CARDIOLIPIN IGA SER IA-ACNC: 1.4
CARDIOLIPIN IGG SER IA-ACNC: 2.2
CARDIOLIPIN IGM SER IA-ACNC: 1.6

## 2024-10-01 LAB
B2 GLYCOPROT1 IGA SERPL IA-ACNC: 0.5
B2 GLYCOPROT1 IGG SERPL IA-ACNC: <0.8
B2 GLYCOPROT1 IGM SERPL IA-ACNC: <2.4

## 2024-11-19 DIAGNOSIS — Z00.6 ENCOUNTER FOR EXAMINATION FOR NORMAL COMPARISON OR CONTROL IN CLINICAL RESEARCH PROGRAM: ICD-10-CM

## 2024-11-20 DIAGNOSIS — N95.1 MENOPAUSAL SYMPTOMS: Primary | ICD-10-CM

## 2024-11-22 ENCOUNTER — APPOINTMENT (OUTPATIENT)
Dept: LAB | Facility: CLINIC | Age: 49
End: 2024-11-22
Payer: COMMERCIAL

## 2024-11-22 DIAGNOSIS — N95.1 MENOPAUSAL SYMPTOMS: Primary | ICD-10-CM

## 2024-11-22 DIAGNOSIS — N95.1 MENOPAUSAL SYMPTOMS: ICD-10-CM

## 2024-11-22 DIAGNOSIS — Z00.6 ENCOUNTER FOR EXAMINATION FOR NORMAL COMPARISON OR CONTROL IN CLINICAL RESEARCH PROGRAM: ICD-10-CM

## 2024-11-22 LAB
ALBUMIN SERPL BCG-MCNC: 4.4 G/DL (ref 3.5–5)
ALP SERPL-CCNC: 56 U/L (ref 34–104)
ALT SERPL W P-5'-P-CCNC: 15 U/L (ref 7–52)
ANION GAP SERPL CALCULATED.3IONS-SCNC: 7 MMOL/L (ref 4–13)
AST SERPL W P-5'-P-CCNC: 16 U/L (ref 13–39)
BILIRUB SERPL-MCNC: 0.45 MG/DL (ref 0.2–1)
BUN SERPL-MCNC: 22 MG/DL (ref 5–25)
CALCIUM SERPL-MCNC: 10 MG/DL (ref 8.4–10.2)
CHLORIDE SERPL-SCNC: 105 MMOL/L (ref 96–108)
CHOLEST SERPL-MCNC: 185 MG/DL (ref ?–200)
CO2 SERPL-SCNC: 27 MMOL/L (ref 21–32)
CORTIS SERPL-MCNC: 11.4 UG/DL
CREAT SERPL-MCNC: 0.71 MG/DL (ref 0.6–1.3)
CRP SERPL QL: <1 MG/L
FSH SERPL-ACNC: 66.5 MIU/ML
GFR SERPL CREATININE-BSD FRML MDRD: 101 ML/MIN/1.73SQ M
GLUCOSE P FAST SERPL-MCNC: 101 MG/DL (ref 65–99)
HDLC SERPL-MCNC: 61 MG/DL
LDLC SERPL CALC-MCNC: 106 MG/DL (ref 0–100)
LH SERPL-ACNC: 30.3 MIU/ML
NONHDLC SERPL-MCNC: 124 MG/DL
POTASSIUM SERPL-SCNC: 4 MMOL/L (ref 3.5–5.3)
PROT SERPL-MCNC: 7.6 G/DL (ref 6.4–8.4)
SODIUM SERPL-SCNC: 139 MMOL/L (ref 135–147)
TRIGL SERPL-MCNC: 92 MG/DL (ref ?–150)

## 2024-11-22 PROCEDURE — 84403 ASSAY OF TOTAL TESTOSTERONE: CPT

## 2024-11-22 PROCEDURE — 83001 ASSAY OF GONADOTROPIN (FSH): CPT

## 2024-11-22 PROCEDURE — 84402 ASSAY OF FREE TESTOSTERONE: CPT

## 2024-11-22 PROCEDURE — 83002 ASSAY OF GONADOTROPIN (LH): CPT

## 2024-11-22 PROCEDURE — 82670 ASSAY OF TOTAL ESTRADIOL: CPT

## 2024-11-22 PROCEDURE — 36415 COLL VENOUS BLD VENIPUNCTURE: CPT

## 2024-11-22 RX ORDER — ESTRADIOL/NORETHINDRONE ACETATE TRANSDERMAL SYSTEM .05; .25 MG/D; MG/D
1 PATCH, EXTENDED RELEASE TRANSDERMAL 2 TIMES WEEKLY
Qty: 24 PATCH | Refills: 4 | Status: SHIPPED | OUTPATIENT
Start: 2024-11-25

## 2024-11-23 LAB
ESTRADIOL SERPL-MCNC: <5 PG/ML
TESTOST FREE SERPL-MCNC: 1.9 PG/ML (ref 0–4.2)
TESTOST SERPL-MCNC: 17 NG/DL (ref 4–50)

## 2024-11-24 ENCOUNTER — RESULTS FOLLOW-UP (OUTPATIENT)
Dept: LABOR AND DELIVERY | Facility: HOSPITAL | Age: 49
End: 2024-11-24

## 2024-11-29 ENCOUNTER — APPOINTMENT (OUTPATIENT)
Dept: RADIOLOGY | Facility: CLINIC | Age: 49
End: 2024-11-29
Payer: COMMERCIAL

## 2024-11-29 ENCOUNTER — OFFICE VISIT (OUTPATIENT)
Dept: OBGYN CLINIC | Facility: CLINIC | Age: 49
End: 2024-11-29
Payer: COMMERCIAL

## 2024-11-29 VITALS
BODY MASS INDEX: 32.2 KG/M2 | HEART RATE: 86 BPM | HEIGHT: 60 IN | DIASTOLIC BLOOD PRESSURE: 75 MMHG | SYSTOLIC BLOOD PRESSURE: 118 MMHG | WEIGHT: 164 LBS

## 2024-11-29 DIAGNOSIS — M17.11 PRIMARY OSTEOARTHRITIS OF RIGHT KNEE: ICD-10-CM

## 2024-11-29 DIAGNOSIS — Z01.89 ENCOUNTER FOR LOWER EXTREMITY COMPARISON IMAGING STUDY: ICD-10-CM

## 2024-11-29 DIAGNOSIS — M25.561 RIGHT KNEE PAIN, UNSPECIFIED CHRONICITY: Primary | ICD-10-CM

## 2024-11-29 DIAGNOSIS — M25.561 RIGHT KNEE PAIN, UNSPECIFIED CHRONICITY: ICD-10-CM

## 2024-11-29 PROCEDURE — 73564 X-RAY EXAM KNEE 4 OR MORE: CPT

## 2024-11-29 PROCEDURE — 20610 DRAIN/INJ JOINT/BURSA W/O US: CPT | Performed by: ORTHOPAEDIC SURGERY

## 2024-11-29 PROCEDURE — 99203 OFFICE O/P NEW LOW 30 MIN: CPT | Performed by: ORTHOPAEDIC SURGERY

## 2024-11-29 PROCEDURE — 73562 X-RAY EXAM OF KNEE 3: CPT

## 2024-11-29 RX ORDER — BUPIVACAINE HYDROCHLORIDE 5 MG/ML
4 INJECTION, SOLUTION EPIDURAL; INTRACAUDAL
Status: COMPLETED | OUTPATIENT
Start: 2024-11-29 | End: 2024-11-29

## 2024-11-29 RX ADMIN — BUPIVACAINE HYDROCHLORIDE 4 ML: 5 INJECTION, SOLUTION EPIDURAL; INTRACAUDAL at 08:30

## 2024-11-29 NOTE — PROGRESS NOTES
"Ortho Sports Medicine New Patient Visit     Assesment:   49 y.o. female with Right knee Medial compartment osteoarthritis    Plan:    We reviewed her XR and discussed she has some Right mild medial joint compartment arthritis. We can treat her knee non operatively. We discussed several options of steroid injections which can be provided today in office. I also discussed PRP injections with her but these are an out of pocket expense  We also discussed attending formal therapy for stretching and strengthening. I will provide her a script.. Bracing was also discussed with the use of an medial .   We also discussed weight loss can help relieve arthritic pain in her knees.   She would like an steroid injection in office today  We also discussed she may have a repeat injection before her February physical fitness test if she needs one.        Follow up:    Return in about 7 weeks (around 1/17/2025) for Recheck.        Chief Complaint   Patient presents with    Right Knee - Locking, Pain       History of Present Illness:    The patient is a 49 y.o. female for evaluation of Right knee pain that started approx 2 weeks ago  Patient states she started running about 2 weeks ago to get in shape for the [employer]. She is working on her commission as a nurse practitioner. She was doing a run and about 3/4 of the way when she got sharp knee pain that stopped her from running. She felt her knee \"locked\" on her as she had pain with bending her knee. Her whole knee felt like it was on fire.   She stated she rested and use KT tape, Tiger balm and ice. Since the initial pain, her knee is about 80 percent improved. She does report mild pain with sit to stand and she needs a few steps to get going. She also has increased pain when she is active on her feet for a long period of time, Her worse pain is at night when she is sleeping and she feels she needs to bend her knee. She notes crepitus in her anterior knee but no feelings of " instability with activities  Pain is noted at the lateral patella with some posterior knee pain at times.  She also notes a tightness in her knee.     Knee Surgical History:  None    Past Medical, Social and Family History:  Past Medical History:   Diagnosis Date    Disease of thyroid gland     2014 nodules on US    Dizziness     Hypertension     Infertility, female     Miscarriage     , ,     Multiple thyroid nodules     Obesity     Resolved 2016    Palpitations     Palpitations     Resolved 2016    PONV (postoperative nausea and vomiting)     Thyroid nodule     Last Assessed: 2014     Thyroid nodule 2019    Vertigo      Past Surgical History:   Procedure Laterality Date    BREAST BIOPSY Right 2001    Percutaneous Needle Core Right , age 27    BREAST LUMPECTOMY  2002    Right     SECTION      CHOLECYSTECTOMY      Laparoscopic     COLPORRHAPHY N/A 2020    Procedure: POST. COLPORRHAPHY;  Surgeon: Buster Santamaria MD;  Location: [facility];  Service: UroGynecology           ENDOMETRIAL ABLATION  2012    LASIK  10/2020    KS EXCISION EXCESSIVE SKIN & SUBQ TISSUE ABDOMEN N/A 12/10/2021    Procedure: ABDOMINOPLASTY;  Surgeon: Isaias Ogden MD;  Location:  MAIN OR;  Service: Plastics    TUBAL LIGATION      WISDOM TOOTH EXTRACTION       No Known Allergies  Current Outpatient Medications on File Prior to Visit   Medication Sig Dispense Refill    Ascorbic Acid (vitamin C) 100 MG tablet Take 100 mg by mouth daily      ergocalciferol (VITAMIN D2) 50,000 units Take 1 capsule (50,000 Units total) by mouth once a week Long term. 12 capsule 2    estradiol (ESTRACE VAGINAL) 0.1 mg/g vaginal cream Insert 1 g into the vagina 3 (three) times a week As needed for dryness.      estradiol-norethindrone (CombiPatch) 0.05-0.25 MG/DAY Place 1 patch on the skin 2 (two) times a week 24 patch 4    folic acid (FOLVITE) 1 mg tablet Take 400 mcg by mouth daily      lisinopril (ZESTRIL) 20 mg  tablet TAKE ONE TABLET BY MOUTH DAILY 90 tablet 1    Omega-3 Fatty Acids (fish oil) 1,000 mg Take 1,000 mg by mouth daily      spironolactone (ALDACTONE) 25 mg tablet TAKE ONE TABLET BY MOUTH DAILY 90 tablet 1    VITAMIN K PO Take 600 mcg by mouth in the morning      aspirin 81 mg chewable tablet Chew 1 tablet (81 mg total) daily 30 tablet 0    atorvastatin (LIPITOR) 40 mg tablet Take 1 tablet (40 mg total) by mouth daily 30 tablet 0    cyanocobalamin (VITAMIN B-12) 1000 MCG tablet Take 1 tablet (1,000 mcg total) by mouth daily 30 tablet 0     No current facility-administered medications on file prior to visit.     Social History     Socioeconomic History    Marital status: /Civil Union     Spouse name: Not on file    Number of children: 3    Years of education: Nursing School     Highest education level: Not on file   Occupational History    Occupation: nurse practicion      Employer: [employer]YE[employer]   Tobacco Use    Smoking status: Never     Passive exposure: Never    Smokeless tobacco: Never   Vaping Use    Vaping status: Never Used   Substance and Sexual Activity    Alcohol use: Yes     Comment: Rarely    Drug use: No    Sexual activity: Yes     Partners: Male     Birth control/protection: Female Sterilization     Comment: declines STD HIV testing   Other Topics Concern    Not on file   Social History Narrative    Lives with Anibal      3 children Donna Navarrete and Tana     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      Social Drivers of Health     Financial Resource Strain: Low Risk  (5/16/2023)    Overall Financial Resource Strain (CARDIA)     Difficulty of Paying Living Expenses: Not hard at all   Food Insecurity: No Food Insecurity (5/16/2023)    Hunger Vital Sign     Worried About Running Out of Food in the Last Year: Never true     Ran Out of Food in the Last Year: Never true   Transportation Needs: No Transportation Needs (5/16/2023)    PRAPARE - Transportation     Lack of  Transportation (Medical): No     Lack of Transportation (Non-Medical): No   Physical Activity: Not on file   Stress: Not on file   Social Connections: Unknown (6/18/2024)    Received from [employer]     How often do you feel lonely or isolated from those around you? (Adult - for ages 18 years and over): Not on file   Intimate Partner Violence: Not on file   Housing Stability: Low Risk  (5/16/2023)    Housing Stability Vital Sign     Unable to Pay for Housing in the Last Year: No     Number of Places Lived in the Last Year: 1     Unstable Housing in the Last Year: No         I have reviewed the past medical, surgical, social and family history, medications and allergies as documented in the EMR.    Review of systems: ROS is negative other than that noted in the HPI.  Constitutional: Negative for fatigue and fever.   HENT: Negative for sore throat.    Respiratory: Negative for shortness of breath.    Cardiovascular: Negative for chest pain.   Gastrointestinal: Negative for abdominal pain.   Endocrine: Negative for cold intolerance and heat intolerance.   Genitourinary: Negative for flank pain.   Musculoskeletal: Negative for back pain.   Skin: Negative for rash.   Allergic/Immunologic: Negative for immunocompromised state.   Neurological: Negative for dizziness.   Psychiatric/Behavioral: Negative for agitation.      Physical Exam:    Blood pressure 118/75, pulse 86, height 5' (1.524 m), weight 74.4 kg (164 lb).    General/Constitutional: NAD, well developed, well nourished  HENT: Normocephalic, atraumatic  CV: Intact distal pulses, regular rate  Resp: No respiratory distress or labored breathing  Abdomen: soft, nondistended   Lymphatic: No lymphadenopathy palpated  Neuro: Alert and Oriented x 3, no focal deficits  Psych: Normal mood, normal affect  Skin: Warm, dry, no rashes, no erythema      Knee Exam:   No significant skin lesions or deformity  Range of motion from 0 slight hyperextension to  125   Lateral, medial joint line tenderness   Knee is stable to varus stress, valgus stress, Lachman, and posterior drawer.    Neurovascularly intact distally  Medial gapping valgus stress with firm endpoint      Knee Imaging    X-rays of the Right knee reviewed and interpreted today. These show Right knee medial compartment mild arthritis changes      Large joint arthrocentesis: R knee  [place] Protocol:  procedure performed by consultantConsent: Verbal consent obtained.  Risks and benefits: risks, benefits and alternatives were discussed  Consent given by: patient  Timeout called at: 11/29/2024 9:06 AM.  Patient understanding: patient states understanding of the procedure being performed  Site marked: the operative site was marked  Patient identity confirmed: verbally with patient  Supporting Documentation  Indications: pain   Procedure Details  Location: knee - R knee  Needle size: 25 G  Ultrasound guidance: no  Approach: anterolateral  Medications administered: 10 mg triamcinolone acetonide 10 mg/mL; 4 mL bupivacaine (PF) 0.5 %    Patient tolerance: patient tolerated the procedure well with no immediate complications  Dressing:  Sterile dressing applied

## 2024-12-03 LAB
APOB+LDLR+PCSK9 GENE MUT ANL BLD/T: NOT DETECTED
BRCA1+BRCA2 DEL+DUP + FULL MUT ANL BLD/T: NOT DETECTED
MLH1+MSH2+MSH6+PMS2 GN DEL+DUP+FUL M: NOT DETECTED

## 2024-12-06 ENCOUNTER — OFFICE VISIT (OUTPATIENT)
Dept: NEUROLOGY | Facility: CLINIC | Age: 49
End: 2024-12-06
Payer: COMMERCIAL

## 2024-12-06 ENCOUNTER — EVALUATION (OUTPATIENT)
Dept: PHYSICAL THERAPY | Facility: REHABILITATION | Age: 49
End: 2024-12-06
Payer: COMMERCIAL

## 2024-12-06 VITALS
HEIGHT: 60 IN | DIASTOLIC BLOOD PRESSURE: 80 MMHG | HEART RATE: 86 BPM | WEIGHT: 164 LBS | SYSTOLIC BLOOD PRESSURE: 120 MMHG | BODY MASS INDEX: 32.2 KG/M2

## 2024-12-06 DIAGNOSIS — M17.11 PRIMARY OSTEOARTHRITIS OF RIGHT KNEE: ICD-10-CM

## 2024-12-06 DIAGNOSIS — M25.561 ACUTE PAIN OF RIGHT KNEE: Primary | ICD-10-CM

## 2024-12-06 DIAGNOSIS — R20.0 LEFT FACIAL NUMBNESS: Primary | ICD-10-CM

## 2024-12-06 PROCEDURE — 97110 THERAPEUTIC EXERCISES: CPT | Performed by: PHYSICAL THERAPIST

## 2024-12-06 PROCEDURE — 99205 OFFICE O/P NEW HI 60 MIN: CPT | Performed by: STUDENT IN AN ORGANIZED HEALTH CARE EDUCATION/TRAINING PROGRAM

## 2024-12-06 PROCEDURE — 97162 PT EVAL MOD COMPLEX 30 MIN: CPT | Performed by: PHYSICAL THERAPIST

## 2024-12-06 NOTE — PROGRESS NOTES
PT Evaluation     Today's date: 2024  Patient name: Radha Gonzalez  : 1975  MRN: [id number]  Referring provider: Charli Pickens*  Dx:   Encounter Diagnosis     ICD-10-CM    1. Acute pain of right knee  M25.561       2. Primary osteoarthritis of right knee  M17.11 Ambulatory Referral to Physical Therapy                     Assessment  Impairments: abnormal coordination, abnormal gait, abnormal muscle firing, abnormal or restricted ROM, abnormal movement, activity intolerance, impaired balance, impaired physical strength, lacks appropriate home exercise program, pain with function and weight-bearing intolerance    Assessment details: Patient presents to PT with R knee pain. Patient displays decreased knee ROM, knee strength, hip strength. These impairments are leading to pain with walking, standing and stairs. Patient will benefit from skilled PT to address above impairments and help them return to PLOF.  Understanding of Dx/Px/POC: good     Prognosis: good    Goals  STG  1. Patient will display decreased pain to 0-2 in 6 weeks  2. Patient will display knee ROM WNL in 6 weeks  3. Patient will display knee strength WNL in 6 weeks    LTG  1. Patient will be able to stand for 30 minutes without pain in 12 weeks  2. Patient will be able to walk for 30 minutes without pain in 12 weeks  3. Patient will be able to go up/down 3 flights of stairs without pain in 12 weeks      Plan  Patient would benefit from: PT eval and skilled physical therapy  Referral necessary: Yes  Planned modality interventions: thermotherapy: hydrocollator packs, manual electrical stimulation, TENS, electrical stimulation/Russian stimulation and cryotherapy    Planned therapy interventions: activity modification, ADL training, balance, balance/weight bearing training, body mechanics training, coordination, flexibility, functional ROM exercises, gait training, graded activity, graded exercise, home exercise program, therapeutic  training, therapeutic exercise, therapeutic activities, stretching, strengthening, neuromuscular re-education, motor coordination training, massage, manual therapy and joint mobilization    Frequency: 2x week  Duration in weeks: 6  Plan of Care beginning date: 2024  Plan of Care expiration date: 2025  Treatment plan discussed with: patient        Subjective Evaluation    History of Present Illness  Mechanism of injury: Patient states she was running about 3 weeks ago and started having sharp pains in her knee. She went to the dr and they did an x-ray that showe mild OA. Patient had an injection which really helped. 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           Not a recurrent problem   Quality of life: fair    Patient Goals  Patient goals for therapy: decreased edema, decreased pain, increased motion and increased strength    Pain  Current pain ratin  At best pain ratin  At worst pain ratin  Quality: sharp and tight  Relieving factors: ice and medications  Aggravating factors: walking, stair climbing and standing    Social Support  Steps to enter house: yes    Employment status: working        Objective     Strength/Myotome Testing     Right Hip   Planes of Motion   Extension: 4  Abduction: 4  External rotation: 4    Right Knee   Flexion: 4+  Extension: 4+    Tests     Right Knee   Negative anterior drawer, lateral Gina, medial Gina, posterior drawer and Thessaly's test at 5 degrees.     General Comments:      Ankle/Foot Comments   Decreased calf length R             Precautions:       Manuals                                                                 Neuro Re-Ed             bridges 3x10            clamshells 3x10 grn            sidesteps                                                                 Ther Ex             Calf s 3x20s            Quad s 3x20s            ITB s                                                                               Ther Activity             squats             running             Gait Training                                       Modalities

## 2024-12-06 NOTE — PROGRESS NOTES
Name: Radha Gonzalez      : 1975      MRN: [id number]  Encounter Provider: Christina Morales MD  Encounter Date: 2024   Encounter department: [facility]  :  Assessment & Plan  Left facial numbness  Pt reports an extended episode of L facial numbness preceded by what sounds like trigeminal neuralgia affecting the L V1 distribution. Symptoms self-resolved after a few weeks. MRI brain w/wo showed no evidence of stroke, demyelination, or other inflammation involving the L trigeminal nerve. Unclear etiology at this time. Symptoms unlikely migrainous in etiology given prolonged course. Lyme negative, possibly viral etiology. Recommended reaching out to the office if symptoms reoccur         CC:   Facial pain    History of Present Illness:     49 year old female with a pmhx of HTN, pre-menstrual headaches, and thyroid nodules.     She presents as a follow up for a hospital visit in August where she was seen for L-sided facial numbness. The patient reports a “zap” that occurred on the left side of her face at around noon. At this time she had some lukas-orbital numbness. 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     On admission, CT Head and CTA showed no signs of acute pathology. She was discharged on aspirin and atorvastatin. Follow-up MRI confirmed no acute pathology. There was some concern for white matter changes. 2 weeks post-hospitalization, she and her PCP decided to stop aspirin and statin due to low cardiovascular risk. LDL in November was 106 and A1C in August was 5.4. 1 month post -hospitalization her symptoms had completely resolved. The numbness resolved in the inverse order of which it presented. Periorbital numbness was both her first presenting and last presenting symptom.     In August, the month of symptom presentation, the patient's father had passed away. 2  months before the episode, her brother-in-law had passed away as well. Patient does not have chronic history of migraines. She noted that she used to have headaches associated with her menstrual cycles in her 20's and 30's. She normally gets 5.5 hours of sleep. When she uses [employer], she gets 9 hours of sleep. She has a history of HTN well controlled by lisinopril until 2023, when spironolactone was added.     Past Medical History:     Past Medical History:   Diagnosis Date    Disease of thyroid gland     2014 nodules on US    Dizziness     Hypertension     Infertility, female     Miscarriage     1995, 1996, 1997    Multiple thyroid nodules     Obesity     Resolved 06/03/2016    Palpitations     Palpitations     Resolved 06/03/2016    PONV (postoperative nausea and vomiting)     Thyroid nodule     Last Assessed: 5/22/2014     Thyroid nodule 03/31/2019    Vertigo        Patient Active Problem List   Diagnosis    Dense breast tissue    Family history of breast cancer    Thyroid nodule    Vitamin D deficiency    Class 1 obesity due to excess calories with serious comorbidity and body mass index (BMI) of 30.0 to 30.9 in adult    Low vitamin D level    Rectocele    Benign essential hypertension    Multiple thyroid nodules    Headache    Left facial numbness       Medications:      Current Outpatient Medications   Medication Sig Dispense Refill    Ascorbic Acid (vitamin C) 100 MG tablet Take 100 mg by mouth daily      ergocalciferol (VITAMIN D2) 50,000 units Take 1 capsule (50,000 Units total) by mouth once a week Long term. 12 capsule 2    estradiol (ESTRACE VAGINAL) 0.1 mg/g vaginal cream Insert 1 g into the vagina 3 (three) times a week As needed for dryness.      estradiol-norethindrone (CombiPatch) 0.05-0.25 MG/DAY Place 1 patch on the skin 2 (two) times a week 24 patch 4    folic acid (FOLVITE) 1 mg tablet Take 400 mcg by mouth daily      lisinopril (ZESTRIL) 20 mg tablet TAKE ONE TABLET BY MOUTH DAILY 90 tablet 1     Omega-3 Fatty Acids (fish oil) 1,000 mg Take 1,000 mg by mouth daily      spironolactone (ALDACTONE) 25 mg tablet TAKE ONE TABLET BY MOUTH DAILY 90 tablet 1    VITAMIN K PO Take 600 mcg by mouth in the morning      aspirin 81 mg chewable tablet Chew 1 tablet (81 mg total) daily 30 tablet 0    atorvastatin (LIPITOR) 40 mg tablet Take 1 tablet (40 mg total) by mouth daily 30 tablet 0    cyanocobalamin (VITAMIN B-12) 1000 MCG tablet Take 1 tablet (1,000 mcg total) by mouth daily 30 tablet 0     No current facility-administered medications for this visit.        Allergies:    No Known Allergies    Family History:     Family History   Problem Relation Age of Onset    Hypertension Mother 40    Heart disease Mother         a.fib, pacemaker, ablations, rheumatic heart disease, MVP    Nephrolithiasis Mother     Diabetes Mother     Hashimoto's thyroiditis Mother     Other Father 65        Bladder Cancer    Cancer Father 68        Bladder cancer    Hypertension Father          bladder cancer age 68    No Known Problems Sister     No Known Problems Sister     No Known Problems Sister     No Known Problems Brother     No Known Problems Brother     No Known Problems Son     No Known Problems Daughter     No Known Problems Daughter     Hypertension Maternal Grandmother 40    Diabetes Maternal Grandmother     Diabetes type II Maternal Grandmother     Other Maternal Grandfather     Stroke Maternal Grandfather         Stroke Syndrome     Prostate cancer Maternal Grandfather 60    Heart disease Maternal Grandfather 70        hx CABG    Cancer Maternal Grandfather         Prostate    Coronary aneurysm Maternal Grandfather         MI at a young age     Diabetes Maternal Grandfather     Diabetes Paternal Grandmother     Cancer Paternal Grandmother 70        Vulvar     Stroke Paternal Grandmother 80    Hypertension Paternal Grandmother     No Known Problems Maternal Aunt     Lymphoma Maternal Uncle 40    Lymphoma Paternal Aunt 50         non hodgkins follicular lymphoma    Hypothyroidism Paternal Aunt     No Known Problems Paternal Aunt     No Known Problems Paternal Aunt     No Known Problems Paternal Aunt     No Known Problems Paternal Aunt     Breast cancer Cousin 47        bilateral , mets to abd/pelvis. Genetic testing negative    Hyperthyroidism Family     Hypothyroidism Family        Social History:       Social History     Socioeconomic History    Marital status: /Civil Union     Spouse name: Not on file    Number of children: 3    Years of education: Nursing School     Highest education level: Not on file   Occupational History    Occupation: nurse practicion      Employer: [employer]   Tobacco Use    Smoking status: Never     Passive exposure: Never    Smokeless tobacco: Never   Vaping Use    Vaping status: Never Used   Substance and Sexual Activity    Alcohol use: Yes     Comment: Rarely    Drug use: No    Sexual activity: Yes     Partners: Male     Birth control/protection: Female Sterilization     Comment: declines STD HIV testing   Other Topics Concern    Not on file   Social History Narrative    Lives with Anibal      3 children Donna Navarrete and Tana     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      Social Drivers of Health     Financial Resource Strain: Low Risk  (5/16/2023)    Overall Financial Resource Strain (CARDIA)     Difficulty of Paying Living Expenses: Not hard at all   Food Insecurity: No Food Insecurity (5/16/2023)    Hunger Vital Sign     Worried About Running Out of Food in the Last Year: Never true     Ran Out of Food in the Last Year: Never true   Transportation Needs: No Transportation Needs (5/16/2023)    PRAPARE - Transportation     Lack of Transportation (Medical): No     Lack of Transportation (Non-Medical): No   Physical Activity: Not on file   Stress: Not on file   Social Connections: Unknown (6/18/2024)    Received from [facility]    Social [place]     How often do you feel  lonely or isolated from those around you? (Adult - for ages 18 years and over): Not on file   Intimate Partner Violence: Not on file   Housing Stability: Low Risk  (5/16/2023)    Housing Stability Vital Sign     Unable to Pay for Housing in the Last Year: No     Number of Places Lived in the Last Year: 1     Unstable Housing in the Last Year: No         Objective:   /80   Pulse 86   Ht 5' (1.524 m)   Wt 74.4 kg (164 lb)   BMI 32.03 kg/m²     General: Patient is not in any acute/apparent distress, well nourished, well developed and cooperative.   HEENT: normocephalic, atraumatic, moist membranes  Neck: supple  Extremities: no edema noted   Skin: no lesions or rash  Musculosketal: no bony abnormalities    Neurologic Examination:   Mental status: alert, awake, oriented X 3 and following commands.     Speech/Language: Speech is fluent without any dysarthria, no aphasia noted, comprehension intact    Cranial Nerves:   CN I: smell not tested  CN II: Visual fields full to confrontation  CN III, IV, VI: Extraocular movements intact bilaterally. Pupils equal round and reactive to light bilaterally.  CN V: Facial sensation is normal.  CN VII: Full and symmetric facial movement.  CN VIII: Hearing is normal.  CN IX, X: Palate elevates symmetrically.   CN XI: Shoulder shrug strength is normal.  CN XII: Tongue midline without atrophy or fasciculations.    Motor:   Strength 5/5 in all 4 extremities. Bulk/tone - normal.  Fasiculations - none    Sensory:   Sensation intact to soft touch in all 4 extremities.    Cerebellar:   Finger-to-nose intact, normal heel to shin.    Reflexes: 2+ in all 4 extremities  Pathologic reflexes - babinski reflex negative  Gait:   Normal gait     Imaging:   MRI brain w/wo  FINDINGS:     BRAIN PARENCHYMA: Mild periventricular white matter changes noted within the cerebral hemispheres immediately adjacent to the frontal horns of the lateral ventricles, unchanged from the prior examination. There  is a white matter lesion identified in the   juxtacortical region of the right posterior frontal vertex best appreciated on series 8 image 73 which appears grossly unchanged allowing for differences in technique. This measures just under 1 cm. Normal corpus callosum. No white matter lesions within   the brainstem or cerebellum. No upper cervical cord white matter lesions.     Postcontrast imaging of the brain demonstrates no abnormal enhancement.     There is no discrete mass, mass effect or midline shift. There is no intracranial hemorrhage.  There is no evidence of acute infarction.     VENTRICLES:  Normal for the patient's age.     SELLA AND PITUITARY GLAND:  Normal.     ORBITS:  Normal.     PARANASAL SINUSES:  Normal.     VASCULATURE: Hypoplastic vertebrobasilar system with fetal origin of both posterior cerebral arteries represents anatomic variation and is unchanged.     CALVARIUM AND SKULL BASE:  Normal.     EXTRACRANIAL SOFT TISSUES:  Normal.     IMPRESSION:     Mild white matter change within the cerebral hemispheres, nonspecific for a patient of this age and unchanged compared to the prior exam. Findings may represent early chronic microangiopathic change. Other possible etiologies would include stable minor   chronic demyelinating disease or sequela of other infectious/inflammatory etiology such as collagen vascular disorders.          Review of Systems:     Review of Systems   Constitutional:  Negative for appetite change, fatigue and fever.   HENT: Negative.  Negative for hearing loss, tinnitus, trouble swallowing and voice change.    Eyes: Negative.  Negative for photophobia, pain and visual disturbance.   Respiratory: Negative.  Negative for shortness of breath.    Cardiovascular: Negative.  Negative for palpitations.   Gastrointestinal: Negative.  Negative for nausea and vomiting.   Endocrine: Negative.  Negative for cold intolerance.   Genitourinary: Negative.  Negative for dysuria, frequency and  urgency.   Musculoskeletal:  Positive for myalgias (Right knee). Negative for back pain, gait problem, neck pain and neck stiffness.   Skin: Negative.  Negative for rash.   Allergic/Immunologic: Negative.    Neurological: Negative.  Negative for dizziness, tremors, seizures, syncope, facial asymmetry, speech difficulty, weakness, light-headedness, numbness and headaches.   Hematological: Negative.  Does not bruise/bleed easily.   Psychiatric/Behavioral: Negative.  Negative for confusion, hallucinations and sleep disturbance.     I have personally reviewed the MA's review of systems and made changes as necessary.    I have spent a total time of 60 minutes in caring for this patient on the day of the visit/encounter including Risks and benefits of tx options, Instructions for management, Patient and family education, Risk factor reductions, Impressions, Documenting in the medical record, Reviewing / ordering tests, medicine, procedures  , and Obtaining or reviewing history  .

## 2024-12-09 NOTE — ASSESSMENT & PLAN NOTE
Pt reports an extended episode of L facial numbness preceded by what sounds like trigeminal neuralgia affecting the L V1 distribution. Symptoms self-resolved after a few weeks. MRI brain w/wo showed no evidence of stroke, demyelination, or other inflammation involving the L trigeminal nerve. Unclear etiology at this time. Symptoms unlikely migrainous in etiology given prolonged course. Lyme negative, possibly viral etiology. Recommended reaching out to the office if symptoms reoccur

## 2024-12-13 ENCOUNTER — OFFICE VISIT (OUTPATIENT)
Dept: PHYSICAL THERAPY | Facility: REHABILITATION | Age: 49
End: 2024-12-13
Payer: COMMERCIAL

## 2024-12-13 DIAGNOSIS — M17.11 PRIMARY OSTEOARTHRITIS OF RIGHT KNEE: Primary | ICD-10-CM

## 2024-12-13 DIAGNOSIS — M25.561 ACUTE PAIN OF RIGHT KNEE: ICD-10-CM

## 2024-12-13 PROCEDURE — 97110 THERAPEUTIC EXERCISES: CPT | Performed by: PHYSICAL THERAPIST

## 2024-12-13 PROCEDURE — 97112 NEUROMUSCULAR REEDUCATION: CPT | Performed by: PHYSICAL THERAPIST

## 2024-12-13 NOTE — PROGRESS NOTES
Daily Note     Today's date: 2024  Patient name: Radha Gonzalez  : 1975  MRN: [id number]  Referring provider: Charli Pickens*  Dx:   Encounter Diagnosis     ICD-10-CM    1. Primary osteoarthritis of right knee  M17.11       2. Acute pain of right knee  M25.561                      Subjective: patient states the knee has been doing well since the injection      Objective: See treatment diary below      Assessment: Tolerated treatment well. Patient demonstrated fatigue post treatment, exhibited good technique with therapeutic exercises, and would benefit from continued PT Patient with tightness in ITB. Added stretching and hip strength to help with that      Plan: Continue per plan of care.      Precautions:       Manuals                                                                Neuro Re-Ed             bridges 3x10 2x10 SL           clamshells 3x10 grn 2x10 grn            sidesteps  Grn 3x10           balance  SLS                                                  Ther Ex             Calf s 3x20s 3x20s           Quad s 3x20s 3x20s           ITB s  10sx5           SL hip abd  2x10           Leg press                                                    Ther Activity             squats             running             Gait Training                                       Modalities

## 2024-12-20 ENCOUNTER — OFFICE VISIT (OUTPATIENT)
Dept: PHYSICAL THERAPY | Facility: REHABILITATION | Age: 49
End: 2024-12-20
Payer: COMMERCIAL

## 2024-12-20 DIAGNOSIS — M25.561 ACUTE PAIN OF RIGHT KNEE: ICD-10-CM

## 2024-12-20 DIAGNOSIS — M17.11 PRIMARY OSTEOARTHRITIS OF RIGHT KNEE: Primary | ICD-10-CM

## 2024-12-20 PROCEDURE — 97110 THERAPEUTIC EXERCISES: CPT | Performed by: PHYSICAL THERAPIST

## 2024-12-20 NOTE — PROGRESS NOTES
Daily Note     Today's date: 2024  Patient name: Radha Gonzalez  : 1975  MRN: [id number]  Referring provider: Charli Pickens*  Dx:   Encounter Diagnosis     ICD-10-CM    1. Primary osteoarthritis of right knee  M17.11       2. Acute pain of right knee  M25.561                      Subjective: patient states she twisted quickly and felt a sharp pain      Objective: See treatment diary below      Assessment: Tolerated treatment well. Patient demonstrated fatigue post treatment, exhibited good technique with therapeutic exercises, and would benefit from continued PT Had to modify POC due to increase in pain. + meniscus tests today. Assess next session      Plan: Continue per plan of care.      Precautions:       Manuals                                                               Neuro Re-Ed             bridges 3x10 2x10 SL 3x10 B          clamshells 3x10 grn 2x10 grn            sidesteps  Grn 3x10           balance  SLS                                                  Ther Ex             Calf s 3x20s 3x20s 3x20s          Quad s 3x20s 3x20s           ITB s  10sx5           SL hip abd  2x10 3x10          Leg press             bike   5 min                                    Ther Activity             squats             running             Gait Training                                       Modalities

## 2024-12-27 ENCOUNTER — OFFICE VISIT (OUTPATIENT)
Dept: PHYSICAL THERAPY | Facility: REHABILITATION | Age: 49
End: 2024-12-27
Payer: COMMERCIAL

## 2024-12-27 DIAGNOSIS — M25.561 ACUTE PAIN OF RIGHT KNEE: ICD-10-CM

## 2024-12-27 DIAGNOSIS — M17.11 PRIMARY OSTEOARTHRITIS OF RIGHT KNEE: Primary | ICD-10-CM

## 2024-12-27 PROCEDURE — 97112 NEUROMUSCULAR REEDUCATION: CPT | Performed by: PHYSICAL THERAPIST

## 2024-12-27 PROCEDURE — 97110 THERAPEUTIC EXERCISES: CPT | Performed by: PHYSICAL THERAPIST

## 2024-12-27 NOTE — PROGRESS NOTES
Daily Note     Today's date: 2024  Patient name: Radha Gonzalez  : 1975  MRN: [id number]  Referring provider: Charli Pickens*  Dx:   Encounter Diagnosis     ICD-10-CM    1. Primary osteoarthritis of right knee  M17.11       2. Acute pain of right knee  M25.561                      Subjective: patient states her knee is feeling a lot better than last week      Objective: See treatment diary below      Assessment: Tolerated treatment well. Patient demonstrated fatigue post treatment, exhibited good technique with therapeutic exercises, and would benefit from continued PT Patient with improving symptoms. Patient was able to progress back to all her exercises. Will assess next session if she can return to running      Plan: Continue per plan of care.      Precautions:       Manuals                                                              Neuro Re-Ed             bridges 3x10 2x10 SL 3x10 B SL 2x10         clamshells 3x10 grn 2x10 grn   Grn 2x10         sidesteps  Grn 3x10  Grn 3x10         balance  SLS                                                  Ther Ex             Calf s 3x20s 3x20s 3x20s          Quad s 3x20s 3x20s  20sx3         ITB s  10sx5  20x3         SL hip abd  2x10 3x10 2x10         Leg press    95# 2x10         bike   5 min 5 min          Knee ext machine    15   # 2x10                      Ther Activity             squats             running             Gait Training                                       Modalities

## 2025-01-03 ENCOUNTER — OFFICE VISIT (OUTPATIENT)
Dept: PHYSICAL THERAPY | Facility: REHABILITATION | Age: 50
End: 2025-01-03
Payer: COMMERCIAL

## 2025-01-03 DIAGNOSIS — M25.561 ACUTE PAIN OF RIGHT KNEE: ICD-10-CM

## 2025-01-03 DIAGNOSIS — M17.11 PRIMARY OSTEOARTHRITIS OF RIGHT KNEE: Primary | ICD-10-CM

## 2025-01-03 PROCEDURE — 97112 NEUROMUSCULAR REEDUCATION: CPT | Performed by: PHYSICAL THERAPIST

## 2025-01-03 PROCEDURE — 97110 THERAPEUTIC EXERCISES: CPT | Performed by: PHYSICAL THERAPIST

## 2025-01-03 NOTE — PROGRESS NOTES
"Daily Note     Today's date: 1/3/2025  Patient name: Radha Gonzalez  : 1975  MRN: [id number]  Referring provider: Charli Pickens*  Dx:   Encounter Diagnosis     ICD-10-CM    1. Primary osteoarthritis of right knee  M17.11       2. Acute pain of right knee  M25.561                      Subjective: patient states she feels like her knee is locking even when she is walking      Objective: See treatment diary below      Assessment: Tolerated treatment fair. Patient demonstrated fatigue post treatment, exhibited good technique with therapeutic exercises, and would benefit from continued PT Patient has all 4 positive meniscus tests plus some locking in her knee. Patient has a physical fitness test for [employer] in a month. We talked about potentially getting an injection before the test and \"muscling\" through the test and then addressing the underlying cause (meniscus) after the test      Plan: Continue per plan of care.      Precautions:       Manuals 12/6 12/13 12/20 12/27 1/3                                                            Neuro Re-Ed             bridges 3x10 2x10 SL 3x10 B SL 2x10 SL 2x10        clamshells 3x10 grn 2x10 grn   Grn 2x10 Grn 2x10        sidesteps  Grn 3x10  Grn 3x10         balance  SLS                                                  Ther Ex             Calf s 3x20s 3x20s 3x20s          Quad s 3x20s 3x20s  20sx3 20sx3        ITB s  10sx5  20x3 20sx3        SL hip abd  2x10 3x10 2x10         Leg press    95# 2x10         bike   5 min 5 min  5 min        Knee ext machine    15   # 2x10                      Ther Activity             squats             running             hopping     done        Gait Training                                       Modalities                                                  "

## 2025-01-13 ENCOUNTER — OFFICE VISIT (OUTPATIENT)
Dept: OBGYN CLINIC | Facility: CLINIC | Age: 50
End: 2025-01-13
Payer: COMMERCIAL

## 2025-01-13 VITALS — BODY MASS INDEX: 32.2 KG/M2 | WEIGHT: 164 LBS | HEIGHT: 60 IN

## 2025-01-13 DIAGNOSIS — M23.91 INTERNAL DERANGEMENT OF RIGHT KNEE: ICD-10-CM

## 2025-01-13 DIAGNOSIS — M17.11 PRIMARY OSTEOARTHRITIS OF RIGHT KNEE: ICD-10-CM

## 2025-01-13 DIAGNOSIS — M25.561 ACUTE PAIN OF RIGHT KNEE: Primary | ICD-10-CM

## 2025-01-13 PROCEDURE — 99214 OFFICE O/P EST MOD 30 MIN: CPT | Performed by: ORTHOPAEDIC SURGERY

## 2025-01-13 NOTE — PROGRESS NOTES
Assessment/Plan:  1. Acute pain of right knee    2. Internal derangement of right knee    3. Primary osteoarthritis of right knee      Orders Placed This Encounter   Procedures    MRI knee right  wo contrast     49 y.o. female with medial meniscus tear and underlying osteoarthritis    Based on her mechanism of injury, continued pain despite conservative treatments, and positive findings on clinical exam today an MRI is warranted for further evaluation. She can continue with physical therapy at this time, activities as tolerated.     Follow up after MRI to review and discuss further treatment.     No follow-ups on file.      Subjective   Chief Complaint:   Chief Complaint   Patient presents with    Right Knee - Follow-up       Radha Gonzalez is a 49 y.o. female who presents for follow up for right knee pain. Patients pain started approximately 6 weeks ago while running. She also notes a twisting incident with audible pop and increased pain. She is working on getting back into shape for the [employer]. Patient has had pain to the medial knee since with minimal improvements. She has tried corticosteroid injection, physical therapy, activity modification, KT tape, OTC analgesics, ice and tiger balm. Today she notes pain with twisting and lateral movements, along with catching and instability.     Review of Systems  ROS:    See HPI for musculoskeletal review.   All other systems reviewed are negative     History:  Past Medical History:   Diagnosis Date    Disease of thyroid gland     2014 nodules on US    Dizziness     Hypertension     Infertility, female     Miscarriage     1995, 1996, 1997    Multiple thyroid nodules     Obesity     Resolved 06/03/2016    Palpitations     Palpitations     Resolved 06/03/2016    PONV (postoperative nausea and vomiting)     Thyroid nodule     Last Assessed: 5/22/2014     Thyroid nodule 03/31/2019    Vertigo      Past Surgical History:   Procedure Laterality Date    BREAST BIOPSY Right 2001     Percutaneous Needle Core Right , age 27    BREAST LUMPECTOMY  2002    Right     SECTION      CHOLECYSTECTOMY      Laparoscopic     COLPORRHAPHY N/A 2020    Procedure: POST. COLPORRHAPHY;  Surgeon: Buster Santamaria MD;  Location: [facility];  Service: UroGynecology           ENDOMETRIAL ABLATION  2012    LASIK  10/2020    IN EXCISION EXCESSIVE SKIN & SUBQ TISSUE ABDOMEN N/A 12/10/2021    Procedure: ABDOMINOPLASTY;  Surgeon: Isaias Ogden MD;  Location:  [facility] OR;  Service: Plastics    TUBAL LIGATION      WISDOM TOOTH EXTRACTION       Social History   Social History     Substance and Sexual Activity   Alcohol Use Yes    Comment: Rarely     Social History     Substance and Sexual Activity   Drug Use No     Social History     Tobacco Use   Smoking Status Never    Passive exposure: Never   Smokeless Tobacco Never     Family History:   Family History   Problem Relation Age of Onset    Hypertension Mother 40    Heart disease Mother         a.fib, pacemaker, ablations, rheumatic heart disease, MVP    Nephrolithiasis Mother     Diabetes Mother     Hashimoto's thyroiditis Mother     Other Father 65        Bladder Cancer    Cancer Father 68        Bladder cancer    Hypertension Father          bladder cancer age 68    No Known Problems Sister     No Known Problems Sister     No Known Problems Sister     No Known Problems Brother     No Known Problems Brother     No Known Problems Son     No Known Problems Daughter     No Known Problems Daughter     Hypertension Maternal Grandmother 40    Diabetes Maternal Grandmother     Diabetes type II Maternal Grandmother     Other Maternal Grandfather     Stroke Maternal Grandfather         Stroke Syndrome     Prostate cancer Maternal Grandfather 60    Heart disease Maternal Grandfather 70        hx CABG    Cancer Maternal Grandfather         Prostate    Coronary aneurysm Maternal Grandfather         MI at a young age     Diabetes Maternal Grandfather      Diabetes Paternal Grandmother     Cancer Paternal Grandmother 70        Vulvar     Stroke Paternal Grandmother 80    Hypertension Paternal Grandmother     No Known Problems Maternal Aunt     Lymphoma Maternal Uncle 40    Lymphoma Paternal Aunt 50        non hodgkins follicular lymphoma    Hypothyroidism Paternal Aunt     No Known Problems Paternal Aunt     No Known Problems Paternal Aunt     No Known Problems Paternal Aunt     No Known Problems Paternal Aunt     Breast cancer Cousin 47        bilateral , mets to abd/pelvis. Genetic testing negative    Hyperthyroidism Family     Hypothyroidism Family        Meds/Allergies   Not in a hospital admission.  No Known Allergies       Objective     Ht 5' (1.524 m)   Wt 74.4 kg (164 lb)   BMI 32.03 kg/m²      PE:  AAOx 3  Well nourished   no audible wheezing  no abdominal distension  LE compartments soft, skin intact    Knee Exam:   No significant skin lesions or deformity  Range of motion from 0 to 130  medial joint line tenderness   + medial Gina  + thessaly's   Palpable bakers cyst   Medial gapping valgus stress with firm endpoint   Knee is stable to varus stress, valgus stress, Lachman, and posterior drawer.    Neurovascularly intact distally      Imaging Studies:     No new images obtained today          Scribe Attestation      I,:  Alma Arndt am acting as a scribe while in the presence of the attending physician.:       I,:  Charli Pickens MD personally performed the services described in this documentation    as scribed in my presence.:

## 2025-01-19 ENCOUNTER — HOSPITAL ENCOUNTER (OUTPATIENT)
Dept: VASCULAR ULTRASOUND | Facility: HOSPITAL | Age: 50
Discharge: HOME | End: 2025-01-19
Payer: COMMERCIAL

## 2025-01-19 ENCOUNTER — HOSPITAL ENCOUNTER (EMERGENCY)
Facility: HOSPITAL | Age: 50
Discharge: HOME | End: 2025-01-19
Attending: EMERGENCY MEDICINE
Payer: COMMERCIAL

## 2025-01-19 VITALS
OXYGEN SATURATION: 98 % | DIASTOLIC BLOOD PRESSURE: 70 MMHG | TEMPERATURE: 98.4 F | HEIGHT: 60 IN | BODY MASS INDEX: 33.15 KG/M2 | SYSTOLIC BLOOD PRESSURE: 141 MMHG | HEART RATE: 75 BPM | WEIGHT: 168.87 LBS | RESPIRATION RATE: 16 BRPM

## 2025-01-19 DIAGNOSIS — M79.604 RIGHT LEG PAIN: ICD-10-CM

## 2025-01-19 DIAGNOSIS — M79.604 RIGHT LEG PAIN: Primary | ICD-10-CM

## 2025-01-19 LAB
ALBUMIN SERPL BCG-MCNC: 4.1 G/DL (ref 3.5–5)
ALP SERPL-CCNC: 58 U/L (ref 34–104)
ALT SERPL W P-5'-P-CCNC: 11 U/L (ref 7–52)
ANION GAP SERPL CALCULATED.3IONS-SCNC: 8 MMOL/L (ref 4–13)
AST SERPL W P-5'-P-CCNC: 12 U/L (ref 13–39)
BASOPHILS # BLD AUTO: 0.05 THOUSANDS/ΜL (ref 0–0.1)
BASOPHILS NFR BLD AUTO: 1 % (ref 0–1)
BILIRUB SERPL-MCNC: 0.43 MG/DL (ref 0.2–1)
BUN SERPL-MCNC: 24 MG/DL (ref 5–25)
CALCIUM SERPL-MCNC: 9.5 MG/DL (ref 8.4–10.2)
CHLORIDE SERPL-SCNC: 105 MMOL/L (ref 96–108)
CO2 SERPL-SCNC: 24 MMOL/L (ref 21–32)
CREAT SERPL-MCNC: 0.76 MG/DL (ref 0.6–1.3)
D DIMER PPP FEU-MCNC: 0.34 UG/ML FEU
EOSINOPHIL # BLD AUTO: 0.17 THOUSAND/ΜL (ref 0–0.61)
EOSINOPHIL NFR BLD AUTO: 2 % (ref 0–6)
ERYTHROCYTE [DISTWIDTH] IN BLOOD BY AUTOMATED COUNT: 12.3 % (ref 11.6–15.1)
GFR SERPL CREATININE-BSD FRML MDRD: 93 ML/MIN/1.73SQ M
GLUCOSE SERPL-MCNC: 103 MG/DL (ref 65–140)
HCT VFR BLD AUTO: 39.1 % (ref 34.8–46.1)
HGB BLD-MCNC: 13.2 G/DL (ref 11.5–15.4)
IMM GRANULOCYTES # BLD AUTO: 0.02 THOUSAND/UL (ref 0–0.2)
IMM GRANULOCYTES NFR BLD AUTO: 0 % (ref 0–2)
LYMPHOCYTES # BLD AUTO: 2.6 THOUSANDS/ΜL (ref 0.6–4.47)
LYMPHOCYTES NFR BLD AUTO: 27 % (ref 14–44)
MCH RBC QN AUTO: 30.2 PG (ref 26.8–34.3)
MCHC RBC AUTO-ENTMCNC: 33.8 G/DL (ref 31.4–37.4)
MCV RBC AUTO: 90 FL (ref 82–98)
MONOCYTES # BLD AUTO: 0.62 THOUSAND/ΜL (ref 0.17–1.22)
MONOCYTES NFR BLD AUTO: 7 % (ref 4–12)
NEUTROPHILS # BLD AUTO: 6.05 THOUSANDS/ΜL (ref 1.85–7.62)
NEUTS SEG NFR BLD AUTO: 63 % (ref 43–75)
NRBC BLD AUTO-RTO: 0 /100 WBCS
PLATELET # BLD AUTO: 289 THOUSANDS/UL (ref 149–390)
PMV BLD AUTO: 9 FL (ref 8.9–12.7)
POTASSIUM SERPL-SCNC: 4 MMOL/L (ref 3.5–5.3)
PROT SERPL-MCNC: 7 G/DL (ref 6.4–8.4)
RBC # BLD AUTO: 4.37 MILLION/UL (ref 3.81–5.12)
SODIUM SERPL-SCNC: 137 MMOL/L (ref 135–147)
WBC # BLD AUTO: 9.51 THOUSAND/UL (ref 4.31–10.16)

## 2025-01-19 PROCEDURE — 99284 EMERGENCY DEPT VISIT MOD MDM: CPT

## 2025-01-19 PROCEDURE — 36415 COLL VENOUS BLD VENIPUNCTURE: CPT

## 2025-01-19 PROCEDURE — 93971 EXTREMITY STUDY: CPT | Performed by: STUDENT IN AN ORGANIZED HEALTH CARE EDUCATION/TRAINING PROGRAM

## 2025-01-19 PROCEDURE — 80053 COMPREHEN METABOLIC PANEL: CPT

## 2025-01-19 PROCEDURE — 85025 COMPLETE CBC W/AUTO DIFF WBC: CPT

## 2025-01-19 PROCEDURE — 85379 FIBRIN DEGRADATION QUANT: CPT

## 2025-01-19 PROCEDURE — 93971 EXTREMITY STUDY: CPT

## 2025-01-19 PROCEDURE — 99283 EMERGENCY DEPT VISIT LOW MDM: CPT

## 2025-01-19 NOTE — ED PROVIDER NOTES
Time reflects when diagnosis was documented in both MDM as applicable and the Disposition within this note       Time User Action Codes Description Comment    1/19/2025  3:30 AM Terrance Denny [M79.604] Right leg pain           ED Disposition       ED Disposition   Discharge    Condition   Stable    Date/Time   Sun Jan 19, 2025  3:37 AM    Comment   Radha Gonzalez discharge to home/self care.                   Assessment & Plan       Medical Decision Making  Patient is a 49-year-old female past medical history of right-sided chronic knee pain and Baker's cyst presenting to the emergency department with c/o R calf pain. Pt had an orthopedic injury in November, states that she was walking and pivoted the wrong way causing pain in her right knee. Pt reports it is has become acutely worse causing 3/10 at rest and 8/10 with movement. + throbbing sensation in calf. Physical exam shows mobile mass behind the right knee.  Patient does show tenderness over the posterior calf and knee.  Right lower extremity shows no overlying erythema warmth crepitus, or swelling. DDX including but not limited to: DVT, Baker's cyst, cellulitis, strain, rhabdomyolysis, metabolic abnormality, fracture; doubt arterial occlusion.  CBC, CMP within normal limits.  Dimer was not elevated at this time.  Fortunately dimer does not potential for DVT.  Patient prescribed referral for outpatient right lower cleft limited venous ultrasound.  Discussed with patient to return to [facility] for ultrasound of the right lower extremity to rule out DVT.  Most likely due dimer not being elevated, with no clinical signs of DVT most likely musculoskeletal in nature.  Discussed with patient to follow-up with PCP and orthopedist in outpatient setting for reevaluation as previously scheduled.  Patient given strict return precautions to return to emergency department for increasing fever, body aches, chills, shortness of breath, hemoptysis, erythema or  swelling of the right lower extremity, or if ultrasound becomes positive for DVT.  Patient verbalized understanding and agrees to current plan.  Patient discharged home in stable condition at this time.      Amount and/or Complexity of Data Reviewed  Labs: ordered.             Medications - No data to display    ED Risk Strat Scores                              Wells' Criteria for PE      Flowsheet Row Most Recent Value   Wells' Criteria for PE    Clinical signs and symptoms of DVT 0 Filed at: 01/19/2025 2008   PE is primary diagnosis or equally likely 0 Filed at: 01/19/2025 2008   HR >100 0 Filed at: 01/19/2025 2008   Immobilization at least 3 days or Surgery in the previous 4 weeks 0 Filed at: 01/19/2025 2008   Previous, objectively diagnosed PE or DVT 0 Filed at: 01/19/2025 2008   Hemoptysis 0 Filed at: 01/19/2025 2008   Malignancy with treatment within 6 months or palliative 0 Filed at: 01/19/2025 2008   Wells' Criteria Total 0 Filed at: 01/19/2025 2008                        History of Present Illness       Chief Complaint   Patient presents with    Leg Pain     C/o R calf pain. Pt had an orthopedic injury in November. Has been following w/ ortho. Outpt MRI for this Friday to r/u meniscus tear. Pt has a cyst behind her R knee that has been getting progressively worse since her injury in Nov. Pt reports it is has become acutely worse causing 3/10 at rest and 8/10 with movement. + throbbing sensation in calf. Pt denies swelling or redness to calf but states it feels heavy. Denies cp/sob.        Past Medical History:   Diagnosis Date    Disease of thyroid gland     2014 nodules on US    Dizziness     Hypertension     Infertility, female     Miscarriage     1995, 1996, 1997    Multiple thyroid nodules     Obesity     Resolved 06/03/2016    Palpitations     Palpitations     Resolved 06/03/2016    PONV (postoperative nausea and vomiting)     Thyroid nodule     Last Assessed: 5/22/2014     Thyroid nodule 03/31/2019     Vertigo       Past Surgical History:   Procedure Laterality Date    BREAST BIOPSY Right 2001    Percutaneous Needle Core Right , age 27    BREAST LUMPECTOMY  2002    Right     SECTION      CHOLECYSTECTOMY      Laparoscopic     COLPORRHAPHY N/A 2020    Procedure: POST. COLPORRHAPHY;  Surgeon: Buster Santamaria MD;  Location: [facility];  Service: UroGynecology           ENDOMETRIAL ABLATION  2012    LASIK  10/2020    MT EXCISION EXCESSIVE SKIN & SUBQ TISSUE ABDOMEN N/A 12/10/2021    Procedure: ABDOMINOPLASTY;  Surgeon: Isaias Ogden MD;  Location:  MAIN OR;  Service: Plastics    TUBAL LIGATION      WISDOM TOOTH EXTRACTION        Family History   Problem Relation Age of Onset    Hypertension Mother 40    Heart disease Mother         a.fib, pacemaker, ablations, rheumatic heart disease, MVP    Nephrolithiasis Mother     Diabetes Mother     Hashimoto's thyroiditis Mother     Other Father 65        Bladder Cancer    Cancer Father 68        Bladder cancer    Hypertension Father          bladder cancer age 68    No Known Problems Sister     No Known Problems Sister     No Known Problems Sister     No Known Problems Brother     No Known Problems Brother     No Known Problems Son     No Known Problems Daughter     No Known Problems Daughter     Hypertension Maternal Grandmother 40    Diabetes Maternal Grandmother     Diabetes type II Maternal Grandmother     Other Maternal Grandfather     Stroke Maternal Grandfather         Stroke Syndrome     Prostate cancer Maternal Grandfather 60    Heart disease Maternal Grandfather 70        hx CABG    Cancer Maternal Grandfather         Prostate    Coronary aneurysm Maternal Grandfather         MI at a young age     Diabetes Maternal Grandfather     Diabetes Paternal Grandmother     Cancer Paternal Grandmother 70        Vulvar     Stroke Paternal Grandmother 80    Hypertension Paternal Grandmother     No Known Problems Maternal Aunt     Lymphoma Maternal Uncle  40    Lymphoma Paternal Aunt 50        non hodgkins follicular lymphoma    Hypothyroidism Paternal Aunt     No Known Problems Paternal Aunt     No Known Problems Paternal Aunt     No Known Problems Paternal Aunt     No Known Problems Paternal Aunt     Breast cancer Cousin 47        bilateral , mets to abd/pelvis. Genetic testing negative    Hyperthyroidism Family     Hypothyroidism Family       Social History     Tobacco Use    Smoking status: Never     Passive exposure: Never    Smokeless tobacco: Never   Vaping Use    Vaping status: Never Used   Substance Use Topics    Alcohol use: Yes     Comment: Rarely    Drug use: No      E-Cigarette/Vaping    E-Cigarette Use Never User       E-Cigarette/Vaping Substances    Nicotine No     THC No     CBD No     Flavoring No     Other No     Unknown No       I have reviewed and agree with the history as documented.     Patient is a 49-year-old female past medical history of right-sided chronic knee pain and Baker's cyst presenting to the emergency department with c/o R calf pain. Pt had an orthopedic injury in November, states that she was walking and pivoted the wrong way causing pain in her right knee.  Patient states that she has been following w/ ortho. Outpt MRI is scheduled for 01/24 to r/u meniscus tear. Pt has a cyst behind her R knee that has been getting progressively worse since her injury in Nov. Pt reports it is has become acutely worse causing 3/10 at rest and 8/10 with movement. + throbbing sensation in calf.  Patient worried about DVT due to new onset calf pain.  Pt states that the calf states it feels heavy.  Patient denies any fever, body aches, chills, numbness, tingling, increased swelling, chest pain, shortness of breath, redness.      Leg Pain  Associated symptoms: no back pain, no fatigue, no fever and no neck pain        Review of Systems   Constitutional:  Negative for chills, diaphoresis, fatigue and fever.   HENT:  Negative for congestion, ear pain,  rhinorrhea, sinus pressure, sinus pain and sore throat.    Eyes:  Negative for pain, discharge, itching and visual disturbance.   Respiratory:  Negative for cough, choking, chest tightness and shortness of breath.    Cardiovascular:  Negative for chest pain, palpitations and leg swelling.   Gastrointestinal:  Negative for abdominal pain, constipation, diarrhea, nausea and vomiting.   Genitourinary:  Negative for dysuria and hematuria.   Musculoskeletal:  Positive for arthralgias and myalgias. Negative for back pain, gait problem, joint swelling, neck pain and neck stiffness.   Skin:  Negative for color change and rash.   Neurological:  Negative for dizziness, seizures, syncope, weakness, numbness and headaches.   All other systems reviewed and are negative.          Objective       ED Triage Vitals [01/19/25 0151]   Temperature Pulse Blood Pressure Respirations SpO2 Patient Position - Orthostatic VS   98.4 °F (36.9 °C) 80 148/70 16 98 % Sitting      Temp src Heart Rate Source BP Location FiO2 (%) Pain Score    -- Monitor Right arm -- 3      Vitals      Date and Time Temp Pulse SpO2 Resp BP Pain Score FACES Pain Rating User   01/19/25 0200 -- 75 98 % -- 141/70 -- --    01/19/25 0158 -- -- -- -- -- 3 --    01/19/25 0151 98.4 °F (36.9 °C) 80 98 % 16 148/70 3 --             Physical Exam  Vitals and nursing note reviewed.   Constitutional:       General: She is not in acute distress.     Appearance: Normal appearance. She is well-developed. She is not ill-appearing.   HENT:      Head: Normocephalic and atraumatic.      Right Ear: Tympanic membrane and external ear normal. There is no impacted cerumen.      Left Ear: Tympanic membrane and external ear normal. There is no impacted cerumen.      Nose: Nose normal. No congestion or rhinorrhea.      Mouth/Throat:      Mouth: Mucous membranes are moist.      Pharynx: Oropharynx is clear. No oropharyngeal exudate or posterior oropharyngeal erythema.   Eyes:      General:          Right eye: No discharge.         Left eye: No discharge.      Extraocular Movements: Extraocular movements intact.      Conjunctiva/sclera: Conjunctivae normal.      Pupils: Pupils are equal, round, and reactive to light.   Cardiovascular:      Rate and Rhythm: Normal rate and regular rhythm.      Pulses: Normal pulses.      Heart sounds: Normal heart sounds. No murmur heard.     No friction rub. No gallop.   Pulmonary:      Effort: Pulmonary effort is normal. No respiratory distress.      Breath sounds: Normal breath sounds. No stridor. No wheezing, rhonchi or rales.   Chest:      Chest wall: No tenderness.   Abdominal:      General: There is no distension.      Palpations: Abdomen is soft.      Tenderness: There is no abdominal tenderness. There is no right CVA tenderness, left CVA tenderness or guarding.   Musculoskeletal:         General: Tenderness present. No swelling, deformity or signs of injury.      Cervical back: Neck supple. No rigidity or tenderness.      Right lower leg: No edema.      Left lower leg: No edema.      Comments: Physical exam shows mobile mass behind the right knee.  Patient does show tenderness over the posterior calf and knee.  Right lower extremity shows no overlying erythema warmth crepitus, or swelling.   Lymphadenopathy:      Cervical: No cervical adenopathy.   Skin:     General: Skin is warm and dry.      Capillary Refill: Capillary refill takes less than 2 seconds.      Coloration: Skin is not jaundiced or pale.      Findings: No bruising, erythema, lesion or rash.   Neurological:      Mental Status: She is alert.   Psychiatric:         Mood and Affect: Mood normal.         Results Reviewed       Procedure Component Value Units Date/Time    Comprehensive metabolic panel [[id number]]  (Abnormal) Collected: 01/19/25 0302    Lab Status: Final result Specimen: Blood from Arm, Right Updated: 01/19/25 0325     Sodium 137 mmol/L      Potassium 4.0 mmol/L      Chloride 105 mmol/L       CO2 24 mmol/L      ANION GAP 8 mmol/L      BUN 24 mg/dL      Creatinine 0.76 mg/dL      Glucose 103 mg/dL      Calcium 9.5 mg/dL      AST 12 U/L      ALT 11 U/L      Alkaline Phosphatase 58 U/L      Total Protein 7.0 g/dL      Albumin 4.1 g/dL      Total Bilirubin 0.43 mg/dL      eGFR 93 ml/min/1.73sq m     Narrative:      National Kidney Disease Foundation guidelines for Chronic Kidney Disease (CKD):     Stage 1 with normal or high GFR (GFR > 90 mL/min/1.73 square meters)    Stage 2 Mild CKD (GFR = 60-89 mL/min/1.73 square meters)    Stage 3A Moderate CKD (GFR = 45-59 mL/min/1.73 square meters)    Stage 3B Moderate CKD (GFR = 30-44 mL/min/1.73 square meters)    Stage 4 Severe CKD (GFR = 15-29 mL/min/1.73 square meters)    Stage 5 End Stage CKD (GFR <15 mL/min/1.73 square meters)  Note: GFR calculation is accurate only with a steady state creatinine    D-Dimer [[id number]]  (Normal) Collected: 01/19/25 0302    Lab Status: Final result Specimen: Blood from Arm, Right Updated: 01/19/25 0321     D-Dimer, Quant 0.34 ug/ml FEU     CBC and differential [[id number]] Collected: 01/19/25 0302    Lab Status: Final result Specimen: Blood from Arm, Right Updated: 01/19/25 0309     WBC 9.51 Thousand/uL      RBC 4.37 Million/uL      Hemoglobin 13.2 g/dL      Hematocrit 39.1 %      MCV 90 fL      MCH 30.2 pg      MCHC 33.8 g/dL      RDW 12.3 %      MPV 9.0 fL      Platelets 289 Thousands/uL      nRBC 0 /100 WBCs      Segmented % 63 %      Immature Grans % 0 %      Lymphocytes % 27 %      Monocytes % 7 %      Eosinophils Relative 2 %      Basophils Relative 1 %      Absolute Neutrophils 6.05 Thousands/µL      Absolute Immature Grans 0.02 Thousand/uL      Absolute Lymphocytes 2.60 Thousands/µL      Absolute Monocytes 0.62 Thousand/µL      Eosinophils Absolute 0.17 Thousand/µL      Basophils Absolute 0.05 Thousands/µL             No orders to display       Procedures    ED Medication and Procedure Management   Prior to Admission  Medications   Prescriptions Last Dose Informant Patient Reported? Taking?   Ascorbic Acid (vitamin C) 100 MG tablet  Self Yes No   Sig: Take 100 mg by mouth daily   Omega-3 Fatty Acids (fish oil) 1,000 mg  Self Yes No   Sig: Take 1,000 mg by mouth daily   VITAMIN K PO  Self Yes No   Sig: Take 600 mcg by mouth in the morning   aspirin 81 mg chewable tablet   No No   Sig: Chew 1 tablet (81 mg total) daily   atorvastatin (LIPITOR) 40 mg tablet   No No   Sig: Take 1 tablet (40 mg total) by mouth daily   cyanocobalamin (VITAMIN B-12) 1000 MCG tablet   No No   Sig: Take 1 tablet (1,000 mcg total) by mouth daily   ergocalciferol (VITAMIN D2) 50,000 units   No No   Sig: Take 1 capsule (50,000 Units total) by mouth once a week Long term.   estradiol (ESTRACE VAGINAL) 0.1 mg/g vaginal cream   No No   Sig: Insert 1 g into the vagina 3 (three) times a week As needed for dryness.   estradiol-norethindrone (CombiPatch) 0.05-0.25 MG/DAY   No No   Sig: Place 1 patch on the skin 2 (two) times a week   folic acid (FOLVITE) 1 mg tablet  Self Yes No   Sig: Take 400 mcg by mouth daily   lisinopril (ZESTRIL) 20 mg tablet   No No   Sig: TAKE ONE TABLET BY MOUTH DAILY   spironolactone (ALDACTONE) 25 mg tablet   No No   Sig: TAKE ONE TABLET BY MOUTH DAILY      Facility-Administered Medications: None     Discharge Medication List as of 1/19/2025  3:44 AM        CONTINUE these medications which have NOT CHANGED    Details   Ascorbic Acid (vitamin C) 100 MG tablet Take 100 mg by mouth daily, Historical Med      aspirin 81 mg chewable tablet Chew 1 tablet (81 mg total) daily, Starting Sat 8/31/2024, Until Mon 9/30/2024, Normal      atorvastatin (LIPITOR) 40 mg tablet Take 1 tablet (40 mg total) by mouth daily, Starting Fri 8/30/2024, Until Sun 9/29/2024, Normal      cyanocobalamin (VITAMIN B-12) 1000 MCG tablet Take 1 tablet (1,000 mcg total) by mouth daily, Starting Sat 8/31/2024, Until Mon 9/30/2024, Normal      ergocalciferol (VITAMIN D2)  50,000 units Take 1 capsule (50,000 Units total) by mouth once a week Long term., Starting Fri 2/23/2024, Normal      estradiol (ESTRACE VAGINAL) 0.1 mg/g vaginal cream Insert 1 g into the vagina 3 (three) times a week As needed for dryness., Starting Fri 9/13/2024, No Print      estradiol-norethindrone (CombiPatch) 0.05-0.25 MG/DAY Place 1 patch on the skin 2 (two) times a week, Starting Mon 11/25/2024, Normal      folic acid (FOLVITE) 1 mg tablet Take 400 mcg by mouth daily, Historical Med      lisinopril (ZESTRIL) 20 mg tablet TAKE ONE TABLET BY MOUTH DAILY, Starting Fri 9/6/2024, Normal      Omega-3 Fatty Acids (fish oil) 1,000 mg Take 1,000 mg by mouth daily, Historical Med      spironolactone (ALDACTONE) 25 mg tablet TAKE ONE TABLET BY MOUTH DAILY, Starting Fri 9/6/2024, Normal      VITAMIN K PO Take 600 mcg by mouth in the morning, Historical Med           Outpatient Discharge Orders   VAS VENOUS DUPLEX -LOWER LIMB UNILATERAL   Standing Status: Future Number of Occurrences: 1 Standing Exp. Date: 01/19/29     ED SEPSIS DOCUMENTATION   Time reflects when diagnosis was documented in both MDM as applicable and the Disposition within this note       Time User Action Codes Description Comment    1/19/2025  3:30 AM Terrance Denny [M79.604] Right leg pain                  Terrance Denny PA-C  01/19/25 2013

## 2025-01-19 NOTE — DISCHARGE INSTRUCTIONS
Take medication as prescribed  Return to [facility] for ultrasound of the right lower extremity  Follow-up with PCP and orthopedist in outpatient setting for reevaluation  Return to emergency department for increasing fever, body aches, chills, shortness of breath, hemoptysis, erythema or swelling of the right lower extremity, or if ultrasound becomes positive for DVT.

## 2025-01-24 ENCOUNTER — HOSPITAL ENCOUNTER (OUTPATIENT)
Dept: RADIOLOGY | Facility: HOSPITAL | Age: 50
Discharge: HOME | End: 2025-01-24
Attending: ORTHOPAEDIC SURGERY
Payer: COMMERCIAL

## 2025-01-24 ENCOUNTER — HOSPITAL ENCOUNTER (OUTPATIENT)
Dept: MAMMOGRAPHY | Facility: CLINIC | Age: 50
Discharge: HOME | End: 2025-01-24
Payer: COMMERCIAL

## 2025-01-24 VITALS — WEIGHT: 168 LBS | BODY MASS INDEX: 31.72 KG/M2 | HEIGHT: 61 IN

## 2025-01-24 DIAGNOSIS — M25.561 ACUTE PAIN OF RIGHT KNEE: ICD-10-CM

## 2025-01-24 DIAGNOSIS — M23.91 INTERNAL DERANGEMENT OF RIGHT KNEE: ICD-10-CM

## 2025-01-24 PROCEDURE — G0279 TOMOSYNTHESIS, MAMMO: HCPCS

## 2025-01-24 PROCEDURE — 73721 MRI JNT OF LWR EXTRE W/O DYE: CPT

## 2025-01-24 PROCEDURE — 77066 DX MAMMO INCL CAD BI: CPT

## 2025-01-25 ENCOUNTER — RESULTS FOLLOW-UP (OUTPATIENT)
Dept: LABOR AND DELIVERY | Facility: HOSPITAL | Age: 50
End: 2025-01-25

## 2025-01-29 ENCOUNTER — RESULTS FOLLOW-UP (OUTPATIENT)
Dept: OBGYN CLINIC | Facility: CLINIC | Age: 50
End: 2025-01-29

## 2025-01-29 NOTE — TELEPHONE ENCOUNTER
----- Message from Maryse CARDENAS sent at 1/29/2025  7:47 AM EST -----  Regarding: FW:  Please call patient. Patient has f/u 01/31  ----- Message -----  From: Charli Pickens MD  Sent: 1/28/2025   6:14 PM EST  To: [facility]  Subject: FW:                                              Please let the patient know that her MRI does show a meniscus root tear, which is what I was concerned about. This often does do best with surgery but we can discuss other options in the clinic at her next visit.  ----- Message -----  From: Interface, Radiology Results In  Sent: 1/27/2025   2:14 PM EST  To: Charli Pickens MD

## 2025-01-29 NOTE — TELEPHONE ENCOUNTER
Spoke with Pt. Relayed Dr. Pickens's message. Pt stated that she knows she needs surgery, but she would like Dr. Mcallister to perform it, since her  had surgery with said doctor before.     She asked for my suggestion about keeping the appointment with Dr. Pickens. I suggested to keep the appointment with Dr. Pickens, in case she needs other treatment in the mean time.

## 2025-01-31 ENCOUNTER — OFFICE VISIT (OUTPATIENT)
Dept: OBGYN CLINIC | Facility: CLINIC | Age: 50
End: 2025-01-31
Payer: COMMERCIAL

## 2025-01-31 ENCOUNTER — APPOINTMENT (OUTPATIENT)
Dept: RADIOLOGY | Facility: CLINIC | Age: 50
End: 2025-01-31
Payer: COMMERCIAL

## 2025-01-31 ENCOUNTER — PREP FOR PROCEDURE (OUTPATIENT)
Dept: OBGYN CLINIC | Facility: CLINIC | Age: 50
End: 2025-01-31

## 2025-01-31 ENCOUNTER — TELEPHONE (OUTPATIENT)
Dept: OBGYN CLINIC | Facility: CLINIC | Age: 50
End: 2025-01-31

## 2025-01-31 VITALS — WEIGHT: 164 LBS | HEIGHT: 61 IN | BODY MASS INDEX: 30.96 KG/M2

## 2025-01-31 DIAGNOSIS — M17.11 PRIMARY OSTEOARTHRITIS OF RIGHT KNEE: Primary | ICD-10-CM

## 2025-01-31 DIAGNOSIS — M17.11 PRIMARY OSTEOARTHRITIS OF RIGHT KNEE: ICD-10-CM

## 2025-01-31 DIAGNOSIS — S83.241D ACUTE MEDIAL MENISCUS TEAR OF RIGHT KNEE, SUBSEQUENT ENCOUNTER: ICD-10-CM

## 2025-01-31 PROBLEM — M23.203 OTHER OLD TEAR OF MEDIAL MENISCUS OF RIGHT KNEE: Status: ACTIVE | Noted: 2025-01-31

## 2025-01-31 PROCEDURE — 99214 OFFICE O/P EST MOD 30 MIN: CPT | Performed by: ORTHOPAEDIC SURGERY

## 2025-01-31 PROCEDURE — 77073 BONE LENGTH STUDIES: CPT

## 2025-01-31 RX ORDER — CEFAZOLIN SODIUM 2 G/50ML
2000 SOLUTION INTRAVENOUS ONCE
OUTPATIENT
Start: 2025-01-31 | End: 2025-01-31

## 2025-01-31 RX ORDER — CHLORHEXIDINE GLUCONATE ORAL RINSE 1.2 MG/ML
15 SOLUTION DENTAL ONCE
OUTPATIENT
Start: 2025-01-31 | End: 2025-01-31

## 2025-01-31 NOTE — TELEPHONE ENCOUNTER
----- Message from Charli Pickens MD sent at 1/31/2025  8:21 AM EST -----  Please reach out to [employer] for a medial  brace

## 2025-02-01 NOTE — PROGRESS NOTES
Assessment/Plan:  1. Primary osteoarthritis of right knee    2. Other old tear of medial meniscus of right knee      Orders Placed This Encounter   Procedures    Medial  Knee Brace    Crutches    T-Rom  Post Op Knee Brace    XR bone length (scanogram)    Ambulatory referral to Physical Therapy     Today reviewed the patient's MRI with her as well as her alignment films.  The patient does have mild medial compartment osteoarthritis.  However she also has a meniscal root tear and anatomic varus alignment with the weightbearing axis passing through the medial tibial spine.  I explained that I was considering whether an osteotomy would be appropriate however given her alignment I do not feel that is an appropriate treatment at this time.  We discussed options for treatment for this including operative and nonoperative options.  She had minimal relief with the previous injection, NSAIDs, activity modification.  We did discuss that additional nonoperative treatment including a medial  brace may be helpful.  However reviewing her MRI this does show a medial meniscus root tear.  We discussed that she already does have some arthritis in the knee however with nonoperative treatment of this type of meniscus tear her arthritis is highly likely to progress in a rapid fashion.  With nonoperative treatment she would be at high risk of requiring a knee replacement within the next 1 to 2 years, possibly even sooner.  I explained that successful repair does usually help slow the progression of arthritis and allow her to maintain function.  However it is unable to treat the arthritis that has already developed in her knee.  For this reason the goal of surgery would not be to relieve all pain in the knee, rather it would be done with the goal of slowing rapid progression of her arthritis.  This would require a period of nonweightbearing of 6 weeks.  If the repair were to fail she would likely require additional  treatment in the near future for her arthritic changes and pain.  Given her goals to continue serving in the  I think an attempt at repair and return to normal level of function is indicated despite the risk of failure and persistent pain.     We had a detailed discussion of the risks, benefits, and alternatives to this procedure. The risks include but are not limited to infection, bleeding, stiffness, loss of range of motion, blood clot, failure of surgery, fracture, risk of potential future arthritis, swelling, injury to surrounding structures/nerve/artery/vein, failure of medical implants or surgical instruments, retained hardware and/or foreign body, and continued pain/dysfunction/disability. We discussed the expected timeline for recovery including the timeline for return to work and sporting activities.  Given that this is her driving leg she will need to remain out of work for 8 weeks until she can safely drive again.  At 8 weeks she can expect to return to desk work and working on her feet will depend on her progress in physical therapy.  By 3 months I expect her to return to working on her feet but not bending or crouching or lifting any heavy options.  I anticipate 6 months prior to clearance for full activity and return to  activities.  If the repair is unsuccessful, or if her arthritic pain remains significant, she may have challenges of the delay at this timeframe.  The patient expressed good understanding and elected to proceed. They will meet be scheduled at a mutually convenient time in the near future.     We will plan to start physical therapy prior to surgery to help her develop strategies for dealing with her nonweightbearing restrictions after surgery.  She can start formal PT again 1 to 3 days after surgery..     Follow up 10-14 days after surgery.       Subjective   Chief Complaint:   Chief Complaint   Patient presents with    Right Knee - Follow-up       Radha Gonzalez is a 49  y.o. female who returns for MRI review.  She continues to have significant pain in the knee that has been slowly worsening.  This is all localized to the medial compartment.  Denies numbness or tingling.  Review of Systems  ROS:    See HPI for musculoskeletal review.   All other systems reviewed are negative     History:  Past Medical History:   Diagnosis Date    Disease of thyroid gland      nodules on US    Dizziness     Hypertension     Infertility, female     Miscarriage     , ,     Multiple thyroid nodules     Obesity     Resolved 2016    Palpitations     Palpitations     Resolved 2016    PONV (postoperative nausea and vomiting)     Thyroid nodule     Last Assessed: 2014     Thyroid nodule 2019    Vertigo      Past Surgical History:   Procedure Laterality Date    BREAST BIOPSY Right 2001    Percutaneous Needle Core Right , age 27    BREAST LUMPECTOMY  2002    Right     SECTION      CHOLECYSTECTOMY      Laparoscopic     COLPORRHAPHY N/A 2020    Procedure: POST. COLPORRHAPHY;  Surgeon: Buster Santamaria MD;  Location: [facility];  Service: UroGynecology           ENDOMETRIAL ABLATION  2012    LASIK  10/2020    NE EXCISION EXCESSIVE SKIN & SUBQ TISSUE ABDOMEN N/A 12/10/2021    Procedure: ABDOMINOPLASTY;  Surgeon: Isaias Ogden MD;  Location:  MAIN OR;  Service: Plastics    TUBAL LIGATION      WISDOM TOOTH EXTRACTION       Social History   Social History     Substance and Sexual Activity   Alcohol Use Yes    Comment: Rarely     Social History     Substance and Sexual Activity   Drug Use No     Social History     Tobacco Use   Smoking Status Never    Passive exposure: Never   Smokeless Tobacco Never     Family History:   Family History   Problem Relation Age of Onset    Hypertension Mother 40    Heart disease Mother         a.fib, pacemaker, ablations, rheumatic heart disease, MVP    Nephrolithiasis Mother     Diabetes Mother     Hashimoto's thyroiditis Mother   "   Other Father 65        Bladder Cancer    Cancer Father 68        Bladder cancer    Hypertension Father          bladder cancer age 68    No Known Problems Sister     No Known Problems Sister     No Known Problems Sister     No Known Problems Brother     No Known Problems Brother     No Known Problems Son     No Known Problems Daughter     No Known Problems Daughter     Hypertension Maternal Grandmother 40    Diabetes Maternal Grandmother     Diabetes type II Maternal Grandmother     Other Maternal Grandfather     Stroke Maternal Grandfather         Stroke Syndrome     Prostate cancer Maternal Grandfather 60    Heart disease Maternal Grandfather 70        hx CABG    Cancer Maternal Grandfather         Prostate    Coronary aneurysm Maternal Grandfather         MI at a young age     Diabetes Maternal Grandfather     Diabetes Paternal Grandmother     Cancer Paternal Grandmother 70        Vulvar     Stroke Paternal Grandmother 80    Hypertension Paternal Grandmother     No Known Problems Maternal Aunt     Lymphoma Maternal Uncle 40    Lymphoma Paternal Aunt 50        non hodgkins follicular lymphoma    Hypothyroidism Paternal Aunt     No Known Problems Paternal Aunt     No Known Problems Paternal Aunt     No Known Problems Paternal Aunt     No Known Problems Paternal Aunt     Breast cancer Cousin 47        bilateral , mets to abd/pelvis. Genetic testing negative    Hyperthyroidism Family     Hypothyroidism Family        Meds/Allergies   Not in a hospital admission.  No Known Allergies       Objective     Ht 5' 1\" (1.549 m)   Wt 74.4 kg (164 lb)   LMP  (LMP Unknown)   BMI 30.99 kg/m²      PE:  AAOx 3  Well nourished   no audible wheezing  no abdominal distension  LE compartments soft, skin intact    Knee Exam:   No significant skin lesions or deformity  Range of motion from 0 to 130  medial joint line tenderness   + medial Gina  + thessaly's   Palpable bakers cyst   Medial gapping valgus stress with firm " endpoint   Knee is stable to varus stress, valgus stress, Lachman, and posterior drawer.    Neurovascularly intact distally      Imaging Studies:     Scanogram alignment film reviewed and interpreted showing mild varus alignment with the weightbearing axis passing through the medial tibial spine.    MRI reviewed interpreted showing mild osteoarthritis in the medial compartment with a meniscal root tear.

## 2025-02-07 ENCOUNTER — OFFICE VISIT (OUTPATIENT)
Dept: PHYSICAL THERAPY | Facility: REHABILITATION | Age: 50
End: 2025-02-07
Payer: COMMERCIAL

## 2025-02-07 DIAGNOSIS — M25.561 ACUTE PAIN OF RIGHT KNEE: Primary | ICD-10-CM

## 2025-02-07 PROCEDURE — 97112 NEUROMUSCULAR REEDUCATION: CPT | Performed by: PHYSICAL THERAPIST

## 2025-02-07 PROCEDURE — 97110 THERAPEUTIC EXERCISES: CPT | Performed by: PHYSICAL THERAPIST

## 2025-02-07 NOTE — PROGRESS NOTES
Daily Note     Today's date: 2025  Patient name: Radha Gonzalez  : 1975  MRN: [id number]  Referring provider: Charli Pickens*  Dx:   Encounter Diagnosis     ICD-10-CM    1. Acute pain of right knee  M25.561                      Subjective: patient states she has a mensicus tear and is getting surgery on       Objective: See treatment diary below      Assessment: Tolerated treatment fair. Patient demonstrated fatigue post treatment, exhibited good technique with therapeutic exercises, and would benefit from continued PT Patient is getting surgery in March. She was given an updated HEP to do prehab for the surgery      Plan: Continue per plan of care.      Precautions:       Manuals 12/6 12/13 12/20 12/27 1/3 2/7                                                           Neuro Re-Ed             bridges 3x10 2x10 SL 3x10 B SL 2x10 SL 2x10 3x10       clamshells 3x10 grn 2x10 grn   Grn 2x10 Grn 2x10 3x10       sidesteps  Grn 3x10  Grn 3x10         balance  SLS                                                  Ther Ex             Calf s 3x20s 3x20s 3x20s          Quad s 3x20s 3x20s  20sx3 20sx3        ITB s  10sx5  20x3 20sx3        SL hip abd  2x10 3x10 2x10  3x10       Leg press    95# 2x10         bike   5 min 5 min  5 min        Knee ext machine    15   # 2x10                      Ther Activity             squats             running             hopping     done        Gait Training                                       Modalities

## 2025-02-24 NOTE — PRE-PROCEDURE INSTRUCTIONS
Pre-Surgery Instructions:   Medication Instructions    Ascorbic Acid (vitamin C) 100 MG tablet Stop taking 7 days prior to surgery.    cyanocobalamin (VITAMIN B-12) 1000 MCG tablet Stop taking 7 days prior to surgery.    ergocalciferol (VITAMIN D2) 50,000 units Stop taking 7 days prior to surgery.    estradiol (ESTRACE VAGINAL) 0.1 mg/g vaginal cream Stop taking 7 days prior to surgery.    estradiol-norethindrone (CombiPatch) 0.05-0.25 MG/DAY Hold day of surgery.    folic acid (FOLVITE) 1 mg tablet Take day of surgery.    lisinopril (ZESTRIL) 20 mg tablet Hold day of surgery.    Omega-3 Fatty Acids (fish oil) 1,000 mg Stop taking 7 days prior to surgery.    spironolactone (ALDACTONE) 25 mg tablet Hold day of surgery.    VITAMIN K PO Stop taking 7 days prior to surgery.    Medication instructions for day of surgery reviewed. Please take all instructed medications with only a sip of water.       You will receive a call one business day prior to surgery with an arrival time and hospital directions. If your surgery is scheduled on a Monday, the hospital will be calling you on the Friday prior to your surgery. If you have not heard from anyone by 8pm, please call the hospital supervisor through the hospital  at [phone]. ([place] [phone] or [place] [phone]).    Do not eat or drink anything after midnight the night before your surgery, including candy, mints, lifesavers, or chewing gum. Do not drink alcohol 24hrs before your surgery. Try not to smoke at least 24hrs before your surgery.       Follow the pre surgery showering instructions as listed in the “My Surgical Experience Booklet” or otherwise provided by your surgeon's office. Do not use a blade to shave the surgical area 1 week before surgery. It is okay to use a clean electric clippers up to 24 hours before surgery. Do not apply any lotions, creams, including makeup, cologne, deodorant, or perfumes after showering on the day of your  surgery. Do not use dry shampoo, hair spray, hair gel, or any type of hair products.     No contact lenses, eye make-up, or artificial eyelashes. Remove nail polish, including gel polish, and any artificial, gel, or acrylic nails if possible. Remove all jewelry including rings and body piercing jewelry.     Wear causal clothing that is easy to take on and off. Consider your type of surgery.    Keep any valuables, jewelry, piercings at home. Please bring any specially ordered equipment (sling, braces) if indicated.    Arrange for a responsible person to drive you to and from the hospital on the day of your surgery. Please confirm the visitor policy for the day of your procedure when you receive your phone call with an arrival time.     Call the surgeon's office with any new illnesses, exposures, or additional questions prior to surgery.    Please reference your “My Surgical Experience Booklet” for additional information to prepare for your upcoming surgery.

## 2025-03-03 DIAGNOSIS — E87.6 HYPOKALEMIA: ICD-10-CM

## 2025-03-03 DIAGNOSIS — I10 BENIGN ESSENTIAL HYPERTENSION: ICD-10-CM

## 2025-03-03 RX ORDER — SPIRONOLACTONE 25 MG/1
25 TABLET ORAL DAILY
Qty: 90 TABLET | Refills: 1 | Status: SHIPPED | OUTPATIENT
Start: 2025-03-03

## 2025-03-03 RX ORDER — LISINOPRIL 20 MG/1
20 TABLET ORAL DAILY
Qty: 90 TABLET | Refills: 1 | Status: SHIPPED | OUTPATIENT
Start: 2025-03-03

## 2025-03-04 ENCOUNTER — ANESTHESIA EVENT (OUTPATIENT)
Dept: PERIOP | Facility: HOSPITAL | Age: 50
End: 2025-03-04
Payer: COMMERCIAL

## 2025-03-04 NOTE — ANESTHESIA PREPROCEDURE EVALUATION
Procedure:  Right knee arthrosocpic medial meniscus root repair (Right: Knee)    Relevant Problems   ANESTHESIA   (+) PONV (postoperative nausea and vomiting)      CARDIO   (+) Benign essential hypertension      MUSCULOSKELETAL   (+) Primary osteoarthritis of right knee      NEURO/PSYCH   (+) Headache      Endocrine   (+) Multiple thyroid nodules      Neurology/Sleep   (+) Left facial numbness      Other   (+) Palpitations        Physical Exam    Airway    Mallampati score: II  TM Distance: >3 FB  Neck ROM: full     Dental       Cardiovascular  Rhythm: regular, Rate: normal    Pulmonary   Breath sounds clear to auscultation    Other Findings  post-pubertal.      Anesthesia Plan  ASA Score- 2     Anesthesia Type- general with ASA Monitors.         Additional Monitors:     Airway Plan: LMA.    Comment: GA with LMA, adductor canal block.       Plan Factors-    Chart reviewed.        Patient is not a current smoker.              Induction- intravenous.    Postoperative Plan- Plan for postoperative opioid use.     Perioperative Resuscitation Plan - Level 1 - Full Code.       Informed Consent- Anesthetic plan and risks discussed with patient.  I personally reviewed this patient with the CRNA. Discussed and agreed on the Anesthesia Plan with the CRNA..      NPO Status:  No vitals data found for the desired time range.

## 2025-03-05 ENCOUNTER — ANESTHESIA (OUTPATIENT)
Dept: PERIOP | Facility: HOSPITAL | Age: 50
End: 2025-03-05
Payer: COMMERCIAL

## 2025-03-05 ENCOUNTER — HOSPITAL ENCOUNTER (OUTPATIENT)
Facility: HOSPITAL | Age: 50
Setting detail: OUTPATIENT SURGERY
Discharge: HOME | End: 2025-03-05
Attending: ORTHOPAEDIC SURGERY | Admitting: ORTHOPAEDIC SURGERY
Payer: COMMERCIAL

## 2025-03-05 VITALS
SYSTOLIC BLOOD PRESSURE: 120 MMHG | TEMPERATURE: 97.5 F | BODY MASS INDEX: 32.47 KG/M2 | HEIGHT: 61 IN | DIASTOLIC BLOOD PRESSURE: 58 MMHG | WEIGHT: 171.96 LBS | RESPIRATION RATE: 18 BRPM | HEART RATE: 57 BPM | OXYGEN SATURATION: 100 %

## 2025-03-05 DIAGNOSIS — Z98.890 S/P MEDIAL MENISCUS REPAIR OF RIGHT KNEE: Primary | ICD-10-CM

## 2025-03-05 DIAGNOSIS — Z47.89 AFTERCARE FOLLOWING SURGERY OF THE MUSCULOSKELETAL SYSTEM: ICD-10-CM

## 2025-03-05 PROCEDURE — NC001 PR NO CHARGE: Performed by: ORTHOPAEDIC SURGERY

## 2025-03-05 PROCEDURE — C1713 ANCHOR/SCREW BN/BN,TIS/BN: HCPCS | Performed by: ORTHOPAEDIC SURGERY

## 2025-03-05 PROCEDURE — 29882 ARTHRS KNE SRG MNISC RPR M/L: CPT

## 2025-03-05 PROCEDURE — 29882 ARTHRS KNE SRG MNISC RPR M/L: CPT | Performed by: ORTHOPAEDIC SURGERY

## 2025-03-05 DEVICE — IMPLANT SYSTEM, SUTURELOC
Type: IMPLANTABLE DEVICE | Site: KNEE | Status: FUNCTIONAL
Brand: ARTHREX®

## 2025-03-05 RX ORDER — MEPERIDINE HYDROCHLORIDE 25 MG/ML
12.5 INJECTION INTRAMUSCULAR; INTRAVENOUS; SUBCUTANEOUS
Status: DISCONTINUED | OUTPATIENT
Start: 2025-03-05 | End: 2025-03-05 | Stop reason: HOSPADM

## 2025-03-05 RX ORDER — NAPROXEN 500 MG/1
500 TABLET ORAL 2 TIMES DAILY WITH MEALS
Qty: 20 TABLET | Refills: 0 | Status: SHIPPED | OUTPATIENT
Start: 2025-03-05

## 2025-03-05 RX ORDER — DEXAMETHASONE SODIUM PHOSPHATE 10 MG/ML
INJECTION, SOLUTION INTRAMUSCULAR; INTRAVENOUS AS NEEDED
Status: DISCONTINUED | OUTPATIENT
Start: 2025-03-05 | End: 2025-03-05

## 2025-03-05 RX ORDER — CEFAZOLIN SODIUM 2 G/50ML
2000 SOLUTION INTRAVENOUS ONCE
Status: COMPLETED | OUTPATIENT
Start: 2025-03-05 | End: 2025-03-05

## 2025-03-05 RX ORDER — FENTANYL CITRATE/PF 50 MCG/ML
25 SYRINGE (ML) INJECTION
Status: DISCONTINUED | OUTPATIENT
Start: 2025-03-05 | End: 2025-03-05 | Stop reason: HOSPADM

## 2025-03-05 RX ORDER — ONDANSETRON 2 MG/ML
4 INJECTION INTRAMUSCULAR; INTRAVENOUS EVERY 6 HOURS PRN
Status: CANCELLED | OUTPATIENT
Start: 2025-03-05

## 2025-03-05 RX ORDER — FENTANYL CITRATE 50 UG/ML
INJECTION, SOLUTION INTRAMUSCULAR; INTRAVENOUS AS NEEDED
Status: DISCONTINUED | OUTPATIENT
Start: 2025-03-05 | End: 2025-03-05

## 2025-03-05 RX ORDER — SCOPOLAMINE 1 MG/3D
1 PATCH, EXTENDED RELEASE TRANSDERMAL ONCE
Status: DISCONTINUED | OUTPATIENT
Start: 2025-03-05 | End: 2025-03-05 | Stop reason: HOSPADM

## 2025-03-05 RX ORDER — OXYCODONE HYDROCHLORIDE 5 MG/1
5 TABLET ORAL EVERY 4 HOURS PRN
Status: DISCONTINUED | OUTPATIENT
Start: 2025-03-05 | End: 2025-03-05 | Stop reason: HOSPADM

## 2025-03-05 RX ORDER — ONDANSETRON 2 MG/ML
INJECTION INTRAMUSCULAR; INTRAVENOUS AS NEEDED
Status: DISCONTINUED | OUTPATIENT
Start: 2025-03-05 | End: 2025-03-05

## 2025-03-05 RX ORDER — SCOPOLAMINE 1 MG/3D
PATCH, EXTENDED RELEASE TRANSDERMAL AS NEEDED
Status: DISCONTINUED | OUTPATIENT
Start: 2025-03-05 | End: 2025-03-05

## 2025-03-05 RX ORDER — SODIUM CHLORIDE, SODIUM LACTATE, POTASSIUM CHLORIDE, CALCIUM CHLORIDE 600; 310; 30; 20 MG/100ML; MG/100ML; MG/100ML; MG/100ML
75 INJECTION, SOLUTION INTRAVENOUS CONTINUOUS
Status: DISCONTINUED | OUTPATIENT
Start: 2025-03-05 | End: 2025-03-05 | Stop reason: HOSPADM

## 2025-03-05 RX ORDER — ACETAMINOPHEN 325 MG/1
650 TABLET ORAL EVERY 6 HOURS PRN
Status: DISCONTINUED | OUTPATIENT
Start: 2025-03-05 | End: 2025-03-05 | Stop reason: HOSPADM

## 2025-03-05 RX ORDER — BUPIVACAINE HYDROCHLORIDE 5 MG/ML
INJECTION, SOLUTION EPIDURAL; INTRACAUDAL
Status: COMPLETED | OUTPATIENT
Start: 2025-03-05 | End: 2025-03-05

## 2025-03-05 RX ORDER — LIDOCAINE HYDROCHLORIDE 10 MG/ML
INJECTION, SOLUTION EPIDURAL; INFILTRATION; INTRACAUDAL; PERINEURAL AS NEEDED
Status: DISCONTINUED | OUTPATIENT
Start: 2025-03-05 | End: 2025-03-05

## 2025-03-05 RX ORDER — CHLORHEXIDINE GLUCONATE ORAL RINSE 1.2 MG/ML
15 SOLUTION DENTAL ONCE
Status: COMPLETED | OUTPATIENT
Start: 2025-03-05 | End: 2025-03-05

## 2025-03-05 RX ORDER — OXYCODONE HYDROCHLORIDE 5 MG/1
5 TABLET ORAL EVERY 6 HOURS PRN
Qty: 10 TABLET | Refills: 0 | Status: SHIPPED | OUTPATIENT
Start: 2025-03-05

## 2025-03-05 RX ORDER — ASPIRIN 81 MG/1
81 TABLET, CHEWABLE ORAL 2 TIMES DAILY
Qty: 84 TABLET | Refills: 0 | Status: SHIPPED | OUTPATIENT
Start: 2025-03-05

## 2025-03-05 RX ORDER — MIDAZOLAM HYDROCHLORIDE 2 MG/2ML
INJECTION, SOLUTION INTRAMUSCULAR; INTRAVENOUS AS NEEDED
Status: DISCONTINUED | OUTPATIENT
Start: 2025-03-05 | End: 2025-03-05

## 2025-03-05 RX ORDER — HYDROMORPHONE HCL/PF 1 MG/ML
0.5 SYRINGE (ML) INJECTION
Status: DISCONTINUED | OUTPATIENT
Start: 2025-03-05 | End: 2025-03-05 | Stop reason: HOSPADM

## 2025-03-05 RX ORDER — ONDANSETRON 2 MG/ML
4 INJECTION INTRAMUSCULAR; INTRAVENOUS ONCE AS NEEDED
Status: COMPLETED | OUTPATIENT
Start: 2025-03-05 | End: 2025-03-05

## 2025-03-05 RX ORDER — ACETAMINOPHEN 500 MG
1000 TABLET ORAL EVERY 8 HOURS
Qty: 60 TABLET | Refills: 0 | Status: SHIPPED | OUTPATIENT
Start: 2025-03-05

## 2025-03-05 RX ORDER — PROPOFOL 10 MG/ML
INJECTION, EMULSION INTRAVENOUS AS NEEDED
Status: DISCONTINUED | OUTPATIENT
Start: 2025-03-05 | End: 2025-03-05

## 2025-03-05 RX ORDER — MAGNESIUM HYDROXIDE 1200 MG/15ML
LIQUID ORAL AS NEEDED
Status: DISCONTINUED | OUTPATIENT
Start: 2025-03-05 | End: 2025-03-05 | Stop reason: HOSPADM

## 2025-03-05 RX ADMIN — FENTANYL CITRATE 25 MCG: 50 INJECTION INTRAMUSCULAR; INTRAVENOUS at 10:03

## 2025-03-05 RX ADMIN — CHLORHEXIDINE GLUCONATE 15 ML: 1.2 SOLUTION ORAL at 08:00

## 2025-03-05 RX ADMIN — BUPIVACAINE 20 ML: 13.3 INJECTION, SUSPENSION, LIPOSOMAL INFILTRATION at 08:35

## 2025-03-05 RX ADMIN — SODIUM CHLORIDE, SODIUM LACTATE, POTASSIUM CHLORIDE, AND CALCIUM CHLORIDE 75 ML/HR: .6; .31; .03; .02 INJECTION, SOLUTION INTRAVENOUS at 08:00

## 2025-03-05 RX ADMIN — FENTANYL CITRATE 50 MCG: 50 INJECTION, SOLUTION INTRAMUSCULAR; INTRAVENOUS at 08:53

## 2025-03-05 RX ADMIN — FENTANYL CITRATE 50 MCG: 50 INJECTION, SOLUTION INTRAMUSCULAR; INTRAVENOUS at 08:31

## 2025-03-05 RX ADMIN — DEXAMETHASONE SODIUM PHOSPHATE 10 MG: 10 INJECTION, SOLUTION INTRAMUSCULAR; INTRAVENOUS at 09:04

## 2025-03-05 RX ADMIN — SCOPOLAMINE 1 PATCH: 1.5 PATCH, EXTENDED RELEASE TRANSDERMAL at 10:37

## 2025-03-05 RX ADMIN — ONDANSETRON 4 MG: 2 INJECTION INTRAMUSCULAR; INTRAVENOUS at 10:11

## 2025-03-05 RX ADMIN — ONDANSETRON 4 MG: 2 INJECTION INTRAMUSCULAR; INTRAVENOUS at 09:04

## 2025-03-05 RX ADMIN — LIDOCAINE HYDROCHLORIDE 50 MG: 10 INJECTION, SOLUTION EPIDURAL; INFILTRATION; INTRACAUDAL; PERINEURAL at 08:44

## 2025-03-05 RX ADMIN — FENTANYL CITRATE 25 MCG: 50 INJECTION INTRAMUSCULAR; INTRAVENOUS at 10:40

## 2025-03-05 RX ADMIN — CEFAZOLIN SODIUM 2000 MG: 2 SOLUTION INTRAVENOUS at 08:48

## 2025-03-05 RX ADMIN — BUPIVACAINE HYDROCHLORIDE 10 ML: 5 INJECTION, SOLUTION EPIDURAL; INTRACAUDAL; PERINEURAL at 08:35

## 2025-03-05 RX ADMIN — PROPOFOL 200 MG: 10 INJECTION, EMULSION INTRAVENOUS at 08:44

## 2025-03-05 RX ADMIN — MIDAZOLAM 2 MG: 1 INJECTION INTRAMUSCULAR; INTRAVENOUS at 08:31

## 2025-03-05 RX ADMIN — SCOPALAMINE 1 PATCH: 1 PATCH, EXTENDED RELEASE TRANSDERMAL at 08:37

## 2025-03-05 RX ADMIN — FENTANYL CITRATE 25 MCG: 50 INJECTION INTRAMUSCULAR; INTRAVENOUS at 10:20

## 2025-03-05 NOTE — DISCHARGE INSTR - AVS FIRST PAGE
Postoperative Instructions Following Knee Surgery    MEDICATIONS:  Resume all home medications unless otherwise instructed by your surgeon.  Pain Medication:   Take Tylenol and Naproxen on prescribed schedule for 7 days  Take Oxycodone as needed for severe pain   If you were given a regional anesthetic (nerve block), it is helpful to take your pain medication before the block wear off.    Possible side effects include nausea, constipation, and urinary retention.  If you experience these side effects, please call our office for assistance.  Pain med refills are authorized only during office hours (8am-4pm, Mon-Fri).  Blood Clot Prevention:   Pump your foot up and down 20 times per hour while you are less mobile.  Ambulate with your crutches at least once every hour   Wear a arthur stocking on the operative leg at all times except for hygiene for 2 weeks following surgery. Tip for smoother placement of stocking - place a plastic bag over the toes/foot then slide the stockings over the foot/ankle and remove the plastic bag. You may need another person to assist you.  Take Aspirin 81 mg twice daily for 6 weeks after surgery.    WOUND CARE:  Keep the dressing clean and dry.  Light drainage may occur the first 2 days postop.  You may remove the dressings and get the incision wet in the shower 48 hours after surgery.  DO NOT remove steri-strips or sutures.  DO NOT immerse the incision under water.  Carefully pat the incision dry.  If there is wound drainage, re-apply a fresh dry gauze dressing.  Please call our office ([phone]) if you experience either of the following:  Sudden increase in swelling, redness, or warmth at the surgical site  Excessive incisional drainage that persists beyond the 3rd day after surgery  Oral temperature greater than 101 degrees, not relieved with Tylenol  Shortness of breath, chest pain, nausea, or any other concerning symptoms    Other pain/swelling control measures:  Cold Therapy:  Apply  "ice (20 min on, 20 min off) as often as you feel is necessary. Ice helps with pain.   Elevation:  Elevate the entire leg above heart level.  Place pillows under your ankle to keep your knee straight. This will help with swelling and prevents stiffness   Compression:  Keep an ace wrap on the operative leg until you return to the office. It may be removed to shower as described above.     RANGE OF MOTION:  You are allowed LIMITED RANGE OF MOTION from 0 degrees (full extension) to 90 degrees of flexion    IMMOBILIZATION:  Your knee brace should be WORN AT ALL TIMES, including sleep.  Keep the brace LOCKED IN FULL EXTENSION FOR WALKING AND SLEEPING.  You may unlock the brace while sitting.  You may remove the brace only for showering.    ACTIVITY:   DO NOT BEAR WEIGHT on the operative leg.  Always use crutches.  You may rest your foot on the ground for balance only for toe-touch weight bearing.  Using Crutches on Stairs:  Going up, lead with your \"good\" (nonoperative) leg.  Going down, lead with your \"bad\" (operative) leg.  Use a hand rail when available.  Knee Extension:  Place a rolled towel or pillow under your ankle for 20-30 minutes 3-5 times per day.  This will help to maintain full knee extension.  Quad Sets:  Sit or lie with your knee straight.  Tighten your quadriceps (front thigh) muscle.  Hold for 3 seconds, then relax.  Repeat 20 times per hour while awake.    PHYSICAL THERAPY:  Begin therapy 1 TO 3 DAYS AFTER SURGERY.  You were given a prescription for therapy at your preoperative office visit or on the day of surgery.  If you do not have physical therapy scheduled yet, please call our office for assistance.      FOLLOW-UP APPOINTMENT:  10-14 days with:    Dr. Ashok Pickens MD    [facility]  [address], [address]  [place] [address]    [facility] [address] [address]    Phone:   [phone]   "

## 2025-03-05 NOTE — PERIOPERATIVE NURSING NOTE
Pt d/c to home at this time with her spouse Via w/c.  Pt left with all belongings. Iv was D/C intact with dry sterile dressing. Right knee surgical dressing clean dry and intact. Encouraged to keep follow up appointments, Verbalized understanding. D/C instructions reviewed and explained with patient and her spouse. Verbalized understanding. New Rx explained, Verbalized understanding.

## 2025-03-05 NOTE — PERIOPERATIVE NURSING NOTE
Received patient from PACU in room 223. Bedside report received from PACU RN, bed in lowest position. Patient is alert and awake, VSS, no distress noted. Right knee surgical dressing clean, dry and intact. RLE neurovascular assessment is WDL. Patient is tolerating PO fluids, call bell placed in reach, will continue to monitor patient until d/c criteria met.

## 2025-03-05 NOTE — ANESTHESIA PROCEDURE NOTES
Peripheral Block    Patient location during procedure: holding area  Start time: 3/5/2025 8:35 AM  Reason for block: procedure for pain, at surgeon's request and post-op pain management  Staffing  Performed by: Jayme Field MD  Authorized by: Jayme Field MD    Preanesthetic Checklist  Completed: patient identified, IV checked, site marked, risks and benefits discussed, surgical consent, monitors and equipment checked, pre-op evaluation and timeout performed  Peripheral Block  Patient position: supine  Prep: ChloraPrep  Patient monitoring: continuous pulse oximetry and heart rate  Block type: Adductor Canal  Laterality: right  Injection technique: single-shot  Procedures: ultrasound guided, Ultrasound guidance required for the procedure to increase accuracy and safety of medication placement and decrease risk of complications.  Ultrasound permanent image saved  bupivacaine (PF) (MARCAINE) 0.5 % injection 20 mL - Perineural   10 mL - 3/5/2025 8:35:00 AM  bupivacaine liposomal (EXPAREL) 1.3 % injection 20 mL - Perineural   20 mL - 3/5/2025 8:35:00 AM  Needle  Needle type: Stimuplex   Needle gauge: 22 G  Needle length: 4 in  Needle localization: ultrasound guidance  Assessment  Injection assessment: frequent aspiration, incremental injection, injected with ease, needle tip visualized at all times, negative aspiration, negative for heart rate change, no paresthesia on injection and no symptoms of intraneural/intravenous injection  Paresthesia pain: none  Post-procedure:  site cleaned  patient tolerated the procedure well with no immediate complications

## 2025-03-05 NOTE — H&P
H&P reviewed. After examining the patient I find no changes in the patients condition since the H&P had been written.    Vitals:    03/05/25 0749   BP: 130/64   Pulse: 74   Resp: 16   Temp: 97.7 °F (36.5 °C)   SpO2: 100%       Ortho Sports Medicine H&P     Assesment:   50 y.o. female with right medial meniscus root tear    Plan:    We again discussed her diagnosis and treatment plan.  We had a detailed discussion of the risks, benefits, and alternatives to this procedure. The risks include but are not limited to infection, bleeding, stiffness, loss of range of motion, blood clot, failure of surgery, fracture, risk of potential future arthritis, swelling, injury to surrounding structures/nerve/artery/vein, failure of medical implants or surgical instruments, retained hardware and/or foreign body, and continued pain/dysfunction/disability. We discussed the expected timeline for recovery including the timeline for return to work and sporting activities. The patient expressed good understanding and elected to proceed.  Written consent is signed. Will proceed to OR today.           Follow up:    No follow-ups on file.        CC: Right knee pain      History of Present Illness:    The patient is a 50 y.o. female presenting for planned right knee surgery today. No changes in symptoms since last visit. Continues to have pain in the knee limiting her ability to perform her normal duties.       Knee Surgical History:  none    Past Medical, Social and Family History:  Past Medical History:   Diagnosis Date    Disease of thyroid gland     2014 nodules on US    Dizziness     Hypertension     Infertility, female     Miscarriage     1995, 1996, 1997    Multiple thyroid nodules     Obesity     Resolved 06/03/2016    Palpitations     Palpitations     Resolved 06/03/2016    PONV (postoperative nausea and vomiting)     Thyroid nodule     Last Assessed: 5/22/2014     Thyroid nodule 03/31/2019    Vertigo      Past Surgical History:    Procedure Laterality Date    BREAST BIOPSY Right     Percutaneous Needle Core Right , age 27    BREAST LUMPECTOMY  2002    Right     SECTION      CHOLECYSTECTOMY      Laparoscopic     COLPORRHAPHY N/A 2020    Procedure: POST. COLPORRHAPHY;  Surgeon: Buster Santamaria MD;  Location: [facility];  Service: UroGynecology           ENDOMETRIAL ABLATION  2012    LASIK  10/2020    NE EXCISION EXCESSIVE SKIN & SUBQ TISSUE ABDOMEN N/A 12/10/2021    Procedure: ABDOMINOPLASTY;  Surgeon: Isaias Ogden MD;  Location:  MAIN OR;  Service: Plastics    TUBAL LIGATION      WISDOM TOOTH EXTRACTION       No Known Allergies  No current facility-administered medications on file prior to encounter.     Current Outpatient Medications on File Prior to Encounter   Medication Sig Dispense Refill    Ascorbic Acid (vitamin C) 100 MG tablet Take 100 mg by mouth daily      ergocalciferol (VITAMIN D2) 50,000 units Take 1 capsule (50,000 Units total) by mouth once a week Long term. 12 capsule 2    estradiol (ESTRACE VAGINAL) 0.1 mg/g vaginal cream Insert 1 g into the vagina 3 (three) times a week As needed for dryness.      estradiol-norethindrone (CombiPatch) 0.05-0.25 MG/DAY Place 1 patch on the skin 2 (two) times a week 24 patch 4    VITAMIN K PO Take 600 mcg by mouth in the morning      cyanocobalamin (VITAMIN B-12) 1000 MCG tablet Take 1 tablet (1,000 mcg total) by mouth daily 30 tablet 0    folic acid (FOLVITE) 1 mg tablet Take 400 mcg by mouth daily      Omega-3 Fatty Acids (fish oil) 1,000 mg Take 1,000 mg by mouth daily       Social History     Socioeconomic History    Marital status: /Civil Union     Spouse name: Not on file    Number of children: 3    Years of education: Nursing School     Highest education level: Not on file   Occupational History    Occupation: nurse practicion      Employer: [employer]   Tobacco Use    Smoking status: Never     Passive exposure: Never    Smokeless tobacco:  Never   Vaping Use    Vaping status: Never Used   Substance and Sexual Activity    Alcohol use: Yes     Comment: Rarely    Drug use: No    Sexual activity: Yes     Partners: Male     Birth control/protection: Female Sterilization     Comment: declines STD HIV testing   Other Topics Concern    Not on file   Social History Narrative    Lives with Anibal      3 children Donna Navarrete and Tana     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      Social Drivers of Health     Financial Resource Strain: Low Risk  (5/16/2023)    Overall Financial Resource Strain (CARDIA)     Difficulty of Paying Living Expenses: Not hard at all   Food Insecurity: No Food Insecurity (5/16/2023)    Hunger Vital Sign     Worried About Running Out of Food in the Last Year: Never true     Ran Out of Food in the Last Year: Never true   Transportation Needs: No Transportation Needs (5/16/2023)    PRAPARE - Transportation     Lack of Transportation (Medical): No     Lack of Transportation (Non-Medical): No   Physical Activity: Not on file   Stress: Not on file   Social Connections: Unknown (6/18/2024)    Received from [facility]    Social Connections     How often do you feel lonely or isolated from those around you? (Adult - for ages 18 years and over): Not on file   Intimate Partner Violence: Not on file   Housing Stability: Low Risk  (5/16/2023)    Housing Stability Vital Sign     Unable to Pay for Housing in the Last Year: No     Number of Places Lived in the Last Year: 1     Unstable Housing in the Last Year: No         I have reviewed the past medical, surgical, social and family history, medications and allergies as documented in the EMR.    Review of systems: ROS is negative other than that noted in the HPI.  Constitutional: Negative for fatigue and fever.   HENT: Negative for sore throat.    Respiratory: Negative for shortness of breath.    Cardiovascular: Negative for chest pain.   Gastrointestinal: Negative for abdominal pain.  "  Endocrine: Negative for cold intolerance and heat intolerance.   Genitourinary: Negative for flank pain.   Musculoskeletal: Negative for back pain.   Skin: Negative for rash.   Allergic/Immunologic: Negative for immunocompromised state.   Neurological: Negative for dizziness.   Psychiatric/Behavioral: Negative for agitation.      Physical Exam:    Blood pressure 130/64, pulse 74, temperature 97.7 °F (36.5 °C), temperature source Skin, resp. rate 16, height 5' 1\" (1.549 m), weight 78 kg (171 lb 15.3 oz), SpO2 100%.    General/Constitutional: NAD, well developed, well nourished  HENT: Normocephalic, atraumatic  CV: Intact distal pulses, regular rate  Resp: No respiratory distress or labored breathing  Abdomen: soft, nondistended   Lymphatic: No lymphadenopathy palpated  Neuro: Alert and Oriented x 3, no focal deficits  Psych: Normal mood, normal affect  Skin: Warm, dry, no rashes, no erythema      Knee Exam:   No significant skin lesions or deformity  Range of motion from 0 to 125   Medial joint line tenderness   +Gina   Knee is stable to varus stress, valgus stress, Lachman, and posterior drawer.    Neurovascularly intact distally    Knee Imaging    X-rays and MRI of the knee reviewed showing medial meniscus root tear and early degenerative changes.         "

## 2025-03-05 NOTE — ANESTHESIA POSTPROCEDURE EVALUATION
Post-Op Assessment Note    CV Status:  Stable  Pain Score: 1    Pain management: adequate       Mental Status:  Awake   Hydration Status:  Stable   PONV Controlled:  None   Airway Patency:  Patent     Post Op Vitals Reviewed: Yes    No anethesia notable event occurred.    Staff: Anesthesiologist           Last Filed PACU Vitals:  Vitals Value Taken Time   Temp 97.5 °F (36.4 °C) 03/05/25 0950   Pulse 59 03/05/25 1100   /58 03/05/25 1100   Resp 18 03/05/25 1100   SpO2 100 % 03/05/25 1100       Modified Keith:     Vitals Value Taken Time   Activity 2 03/05/25 1000   Respiration 2 03/05/25 1000   Circulation 2 03/05/25 1000   Consciousness 2 03/05/25 1000   Oxygen Saturation 2 03/05/25 1000     Modified Keith Score: 10

## 2025-03-05 NOTE — OP NOTE
OPERATIVE REPORT  PATIENT NAME: Radha Gonzalez    :  1975  MRN: [id number]  Pt Location: [facility] OR ROOM 03    SURGERY DATE: 3/5/2025    Surgeons and Role:     * Charli Pickens MD - Primary     * Estephania Iraheta PA-C - Assisting    The presence of Estephania Iraheta PA-C was required for poisitioning, retraction, assistance, and closure. No qualified resident was available.      Preop Diagnosis:  Other old tear of medial meniscus of right knee [M23.203]    Post-Op Diagnosis Codes:     * Other old tear of medial meniscus of right knee [M23.203]    Procedure(s):  Right - Right knee arthrosocpic medial meniscus root repair & chondroplasty    Specimen(s):  * No specimens in log *    Estimated Blood Loss:   Minimal    Drains:  * No LDAs found *    Anesthesia Type:   General w/ Regional    Implants:     Implant Name Type Inv. Item Serial No.  Lot No. LRB No. Used Action   ANCHOR SUT MENISCAL ROOT REPAIR [employer] - [id number]  ANCHOR SUT MENISCAL ROOT REPAIR [employer]  [id number] 1 Implanted         Operative Indications:  50-year-old female who presented with acute pain in the right knee.  Symptoms were consistent with early degeneration as well as meniscus tear.  We did proceed with a period of nonoperative treatment which was not successful.  The patient still had significant pain and dysfunction.  MRI was obtained which showed a medial meniscus root tear with several small areas of chondral degeneration but overall well-maintained joint spaces.  Given the high risk of rapidly progressive arthritis with a root tear I did recommend surgical repair to include a right knee arthroscopic medial meniscal root repair. We had a detailed discussion of the risks, benefits, and alternatives to this procedure. The risks include but are not limited to infection, bleeding, stiffness, loss of range of motion, blood clot, failure of surgery, fracture, risk of potential future arthritis, swelling, injury to  surrounding structures/nerve/artery/vein, failure of medical implants or surgical instruments, retained hardware and/or foreign body, and continued pain/dysfunction/disability. We discussed the expected timeline for recovery including the timeline for return to work and sporting activities. The patient expressed good understanding and elected to proceed. Written consent was signed.    Findings:      Arthroscopic evaluation of the Right knee revealed the following:     Medial meniscus: Full-thickness tear at the root.  Small areas of degeneration along the posterior horn but no other focal tearing  Medial femoral condyle: Grade 2 and 3 degenerative changes  Medial tibial plateau: 2 degenerative changes  Anterior cruciate ligament: Normal appearance.  Posterior cruciate ligament: Normal appearance.   Lateral meniscus: Normal  Lateral femoral condyle: Grade 1 degenerative changes  Lateral tibial plateau: Grade 1 degenerative changes  Medial and lateral gutters: No loose bodies.   Patella: Grade 1 degenerative changes  Trochlea: Grade 1 degenerative changes       Procedure:  In the pre-operative holding area, the patient identified the correct operative extremity and I marked that extremity with my initials. The patient was then brought to the operating room and positioned supine. Following satisfactory induction of anesthesia, the right knee was prepped and draped in the usual sterile fashion for surgical arthroscopy of the right knee. Before any surgical instrumentation was passed to me by the surgical technician, a formalized time-out occurred, which involves the surgeon, circulating nurse, and anesthesia staff all verifying the correct operative extremity. My initials were visible on the prepped and draped operative field.      The anatomic landmarks of the anteromedial and anterolateral portals were marked.  The limb was exsanguinated with an Esmarch bandage and the tourniquet was inflated to 250 mmHg. the  anterolateral portal was established with a #11 blade. The arthroscope was introduced through this portal. Under direct visualization, the anteromedial portal was established with a localizing needle followed by a scalpel. A probe was then introduced into the anteromedial portal. A systematic diagnostic arthroscopy evaluated the following:  medial compartment, notch, lateral compartment, patellofemoral compartment, medial gutter, and lateral gutter.      The medial meniscus root was evaluated showing full-thickness tear.  There was a 1-2 mm stump remaining at the meniscal root attachment.  Synovium within the notch and surrounding the meniscal root were debrided with an arthroscopic shaver to improve visualization.   Once adequate visualization was achieved, I used a arthroscopic curette to remove tibial cartilage immediately adjacent to the root attachment site to promote healing of the root.  Next I placed a 1 cm incision over the anterior aspect of the lateral proximal tibia proximal to the anterior compartment.  A guide was placed into the knee at the root site.  The guide was advanced to the tibial cortex.  A cannulated drill pin was drilled through the guide exiting into the knee joint at the root attachment.  Correct position of this site was confirmed to be within the anatomic footprint.  A Nitinol  wire was passed through the cannulated drill pin and pulled out the anteromedial cannula.  The nitinol wire was used to pass a looped braided suture through the tibia.  This was used to pass the Arthrex suture lock root repair device.  The repair sutures from the suture line device were passed through the meniscal tissue.  These were then shuttled back down into the device and tension.  Traction placed on the sutures reduce the meniscal root back to its anatomic footprint.  Final arthroscopic evaluation of the meniscal root showed that it was reduced in the anatomic footprint and stable.  I used an arthroscopic  shaver to debride frayed edges of femoral condyle cartilage in the medial compartment back to a stable rim.     There was no additional pathology. All particulate debris was removed. The knee was copiously rinsed and then drained. The portals were closed with an interrupted 3-0 nylon suture. The skin was cleansed with sterile saline and dried before Steri-Strips were applied. Finally, a sterile dressing was secured by Webril and an Ace wrap, followed by a hinged knee brace locked in extension.     The procedure was well-tolerated.  She emerged from anesthesia.  There are no apparent complications.  She was taken to the recovery room in stable condition.    SIGNATURE: Charli Pickens MD  DATE: March 5, 2025  TIME: 2:09 PM

## 2025-03-05 NOTE — ANESTHESIA POSTPROCEDURE EVALUATION
Post-Op Assessment Note    CV Status:  Stable  Pain Score: 0    Pain management: adequate       Mental Status:  Sleepy and arousable   Hydration Status:  Stable   PONV Controlled:  None   Airway Patency:  Patent  Two or more mitigation strategies used for obstructive sleep apnea   Post Op Vitals Reviewed: Yes    No anethesia notable event occurred.    Staff: CRNA   Comments: spontaneously breathing, HOB @ 30 degrees, simpel mask to O2, fully endorsed to recovery w/o AC          Last Filed PACU Vitals:  Vitals Value Taken Time   Temp     Pulse 72    /64    Resp 14    SpO2 99

## 2025-03-05 NOTE — PROGRESS NOTES
Patient alert and awake, dressing clean, dry and intact, vital signs WNL. Patient transferred to APU via stretcher. Bedside report given to ANNIE Colindres. Stretcher in lowest position and locked, side rails up, call bell within reach.

## 2025-03-07 ENCOUNTER — OFFICE VISIT (OUTPATIENT)
Dept: PHYSICAL THERAPY | Facility: REHABILITATION | Age: 50
End: 2025-03-07
Payer: COMMERCIAL

## 2025-03-07 DIAGNOSIS — M25.561 ACUTE PAIN OF RIGHT KNEE: Primary | ICD-10-CM

## 2025-03-07 PROCEDURE — 97162 PT EVAL MOD COMPLEX 30 MIN: CPT | Performed by: PHYSICAL THERAPIST

## 2025-03-07 PROCEDURE — 97110 THERAPEUTIC EXERCISES: CPT | Performed by: PHYSICAL THERAPIST

## 2025-03-07 NOTE — PROGRESS NOTES
PT Evaluation     Today's date: 3/7/2025  Patient name: Radha Gonzalez  : 1975  MRN: [id number]  Referring provider: Charli Pickens*  Dx:   Encounter Diagnosis     ICD-10-CM    1. Acute pain of right knee  M25.561                      Assessment  Impairments: abnormal coordination, abnormal gait, abnormal muscle firing, abnormal or restricted ROM, abnormal movement, activity intolerance, impaired balance, impaired physical strength, lacks appropriate home exercise program, pain with function and weight-bearing intolerance    Assessment details: Patient presents to PT with R knee pain s/p meniscus repair. Patient displays decreased knee ROM, knee strength, abnormal gait. These impairments are leading to pain with walking, standing, stairs. Patient will benefit from skilled PT to address above impairments and help them return to PLOF.  Barriers to therapy: Unable to weight bear  Understanding of Dx/Px/POC: good     Prognosis: good    Goals  STG  1. Patient will display decreased pain to 0-2 in 6 weeks  2. Patient will display knee ROM WNL in 6 weeks  3. Patient will display knee strength WNL in 6 weeks    LTG  1. Patient will be able to stand for 30 minutes without pain in 12 weeks  2. Patient will be able to walk for 30 minutes without pain in 12 weeks  3. Patient will be able to go up/down 3 flights of stairs without pain in 12 weeks      Plan  Patient would benefit from: PT eval and skilled physical therapy  Referral necessary: Yes  Planned modality interventions: thermotherapy: hydrocollator packs, manual electrical stimulation, TENS, electrical stimulation/Russian stimulation and cryotherapy    Planned therapy interventions: activity modification, ADL training, balance, balance/weight bearing training, body mechanics training, coordination, flexibility, functional ROM exercises, gait training, graded activity, graded exercise, home exercise program, therapeutic training, therapeutic exercise,  "therapeutic activities, stretching, strengthening, neuromuscular re-education, motor coordination training, massage, manual therapy and joint mobilization    Frequency: 2x week  Duration in weeks: 6  Plan of Care beginning date: 3/7/2025  Plan of Care expiration date: 2025  Treatment plan discussed with: patient        Subjective Evaluation    History of Present Illness  Mechanism of injury: Patient had a R meniscus repair on 3/7. She is doing well. The nerve block hasn't worn off yet. Patient's pain is under control. She is NWB with her brace locked in ext. Patient's incision looks good. She is under to walk, stand on the leg. Stairs are very challenging. Luckily, she only has small ones to get in the house and between rooms          Not a recurrent problem   Quality of life: fair    Patient Goals  Patient goals for therapy: decreased edema, improved balance, decreased pain, increased motion, return to work and increased strength    Pain  Current pain ratin  At best pain ratin  At worst pain ratin  Quality: sharp and tight  Aggravating factors: stair climbing, walking and standing    Social Support  Steps to enter house: yes    Employment status: not working        Objective     Active Range of Motion     Right Knee   Flexion: 45 degrees with pain  Extension: 2 degrees with pain    Passive Range of Motion     Right Knee   Flexion: 50 degrees with pain  Extension: 0 degrees with pain    Strength/Myotome Testing     Right Knee   Quadriceps contraction: fair    Ambulation     Comments   NWB. Crutches and brace locked in ext             Precautions: meniscus repair 3/5/25      Manuals 3/7            PROM                                                    Neuro Re-Ed             Quad sets 5\"x10                                                                                          Ther Ex             Heel slides 2x10            saq             laq             bike                                        "                          Ther Activity             sts             Step ups             Gait Training                                       Modalities

## 2025-03-10 ENCOUNTER — OFFICE VISIT (OUTPATIENT)
Dept: PHYSICAL THERAPY | Facility: REHABILITATION | Age: 50
End: 2025-03-10
Payer: COMMERCIAL

## 2025-03-10 DIAGNOSIS — M25.561 ACUTE PAIN OF RIGHT KNEE: Primary | ICD-10-CM

## 2025-03-10 PROCEDURE — 97110 THERAPEUTIC EXERCISES: CPT | Performed by: PHYSICAL THERAPIST

## 2025-03-10 PROCEDURE — 97140 MANUAL THERAPY 1/> REGIONS: CPT | Performed by: PHYSICAL THERAPIST

## 2025-03-10 PROCEDURE — 97112 NEUROMUSCULAR REEDUCATION: CPT | Performed by: PHYSICAL THERAPIST

## 2025-03-10 NOTE — PROGRESS NOTES
"Daily Note     Today's date: 3/10/2025  Patient name: Radha Gonzalez  : 1975  MRN: [id number]  Referring provider: Charli Pickens*  Dx:   Encounter Diagnosis     ICD-10-CM    1. Acute pain of right knee  M25.561                      Subjective: patient states she is starting to get a little pain now that the nerve block is wearing off      Objective: See treatment diary below      Assessment: Tolerated treatment well. Patient demonstrated fatigue post treatment, exhibited good technique with therapeutic exercises, and would benefit from continued PT Patient with improving knee flexion ROM. Good quad control noted      Plan: Continue per plan of care.      Precautions: meniscus repair 3/5/25      Manuals 3/7 3/10           PROM  done                                                  Neuro Re-Ed             Quad sets 5\"x10 5\"x10                                                                                         Ther Ex             Heel slides 2x10 2x10           saq  2x10           laq  2x10           bike             slr  2x10           Calf s  10sx5                                     Ther Activity             sts             Step ups             Gait Training                                       Modalities                                            "

## 2025-03-14 ENCOUNTER — OFFICE VISIT (OUTPATIENT)
Dept: PHYSICAL THERAPY | Facility: REHABILITATION | Age: 50
End: 2025-03-14
Payer: COMMERCIAL

## 2025-03-14 DIAGNOSIS — M25.561 ACUTE PAIN OF RIGHT KNEE: Primary | ICD-10-CM

## 2025-03-14 PROCEDURE — 97112 NEUROMUSCULAR REEDUCATION: CPT | Performed by: PHYSICAL THERAPIST

## 2025-03-14 PROCEDURE — 97110 THERAPEUTIC EXERCISES: CPT | Performed by: PHYSICAL THERAPIST

## 2025-03-14 NOTE — PROGRESS NOTES
"Daily Note     Today's date: 3/14/2025  Patient name: Radha Gonzalez  : 1975  MRN: [id number]  Referring provider: Charli Pickens*  Dx:   Encounter Diagnosis     ICD-10-CM    1. Acute pain of right knee  M25.561                      Subjective: patient states she fell and landed on her shoulder so that hurts but the knee is ok      Objective: See treatment diary below      Assessment: Tolerated treatment well. Patient demonstrated fatigue post treatment, exhibited good technique with therapeutic exercises, and would benefit from continued PT Patient with improving knee ROM and strength. Patient able to achieve 90 degrees of flexion today      Plan: Continue per plan of care.      Precautions: meniscus repair 3/5/25    PT 1 on 1: 8:33-8:56      Manuals 3/7 3/10 3/14          PROM  done done                                                 Neuro Re-Ed             Quad sets 5\"x10 5\"x10 5\"x10                                                                                        Ther Ex             Heel slides 2x10 2x10 2x10          saq  2x10 3x10          laq  2x10 3x10          bike   5' rock          slr  2x10 3x10          Calf s  10sx5 10sx5                                    Ther Activity             sts             Step ups             Gait Training                                       Modalities                                              "

## 2025-03-17 ENCOUNTER — OFFICE VISIT (OUTPATIENT)
Dept: PHYSICAL THERAPY | Facility: REHABILITATION | Age: 50
End: 2025-03-17
Payer: COMMERCIAL

## 2025-03-17 DIAGNOSIS — M25.561 ACUTE PAIN OF RIGHT KNEE: Primary | ICD-10-CM

## 2025-03-17 PROCEDURE — 97110 THERAPEUTIC EXERCISES: CPT | Performed by: PHYSICAL THERAPIST

## 2025-03-17 PROCEDURE — 97112 NEUROMUSCULAR REEDUCATION: CPT | Performed by: PHYSICAL THERAPIST

## 2025-03-17 PROCEDURE — 97140 MANUAL THERAPY 1/> REGIONS: CPT | Performed by: PHYSICAL THERAPIST

## 2025-03-17 NOTE — PROGRESS NOTES
"Daily Note     Today's date: 3/17/2025  Patient name: Radha Gonzalez  : 1975  MRN: [id number]  Referring provider: Charli Pickens*  Dx:   Encounter Diagnosis     ICD-10-CM    1. Acute pain of right knee  M25.561                      Subjective: patient states her knee continues to improve week to week      Objective: See treatment diary below      Assessment: Tolerated treatment well. Patient demonstrated fatigue post treatment, exhibited good technique with therapeutic exercises, and would benefit from continued PT Patient with improving knee flexion ROM. Patient already able to achieve 90 degrees of knee flexion ROM. Improving quad control noted      Plan: Continue per plan of care.      Precautions: meniscus repair 3/5/25      Manuals 3/7 3/10 3/14 3/17         PROM  done done done                                                Neuro Re-Ed             Quad sets 5\"x10 5\"x10 5\"x10 5\"x10                                                                                       Ther Ex             Heel slides 2x10 2x10 2x10 2x10x5\"         saq  2x10 3x10 1# 3x10         laq  2x10 3x10 1# 3x10         bike   5' rock 5' rock         slr  2x10 3x10 3x10         Calf s  10sx5 10sx5 10sx5                                   Ther Activity             sts             Step ups             Gait Training                                       Modalities                                                "

## 2025-03-20 ENCOUNTER — OFFICE VISIT (OUTPATIENT)
Dept: OBGYN CLINIC | Facility: CLINIC | Age: 50
End: 2025-03-20

## 2025-03-20 ENCOUNTER — APPOINTMENT (OUTPATIENT)
Dept: RADIOLOGY | Facility: CLINIC | Age: 50
End: 2025-03-20
Payer: COMMERCIAL

## 2025-03-20 DIAGNOSIS — Z98.890 S/P MEDIAL MENISCUS REPAIR OF RIGHT KNEE: Primary | ICD-10-CM

## 2025-03-20 DIAGNOSIS — M25.511 ACUTE PAIN OF RIGHT SHOULDER: ICD-10-CM

## 2025-03-20 PROCEDURE — 73030 X-RAY EXAM OF SHOULDER: CPT

## 2025-03-20 PROCEDURE — 99024 POSTOP FOLLOW-UP VISIT: CPT | Performed by: ORTHOPAEDIC SURGERY

## 2025-03-20 NOTE — PROGRESS NOTES
Patient Name: Radha Gonzalez      : 1975       MRN: [id number]   Encounter Provider: Charli Pickens MD   Encounter Date: 25  Encounter department: [facility]         Assessment & Plan  S/P medial meniscus repair of right knee  We reviewed operative images and reviewed the procedure in detail. I believe the patient is doing well postoperatively. Surgical incisions healing well. No erythema or signs of infection. Sutures were removed and steri-strips were applied.     I recommended the following weight bearing status: continue non weightbearing with the use of the T-ROM brace and crutches for another 4 weeks. Continue with ASA for a total of 6 weeks.     Continue physical therapy per provided protocol.     OTC medications as needed for pain control.     Follow up in 4 weeks.          Acute pain of right shoulder  X-rays were reviewed which are negative for fracture or dislocations.  On exam, she has good strength and motion discussed her pain should continue to improve.  If her pain does not improve, we can consider a referral to formal therapy.   Orders:    XR shoulder 2+ vw right; Future       _____________________________________________________  CHIEF COMPLAINT:  Chief Complaint   Patient presents with    Right Knee - Post-op         SUBJECTIVE:  Patient is a 50 y.o. year old female who presents for follow up now 2 weeks s/p Right knee arthrosocpic medial meniscus root repair & chondroplasty - Right performed on 3/5/2025.  Today patient states she is doing well. She has been attending formal therapy and making progress. She notes minimal pain about the knee. She has been taking the Naproxen to help with swelling. She did not take the narcotic pain medication. She states she did have a fall on Friday landing onto her right shoulder. She has been compliant with the T-ROM brace.    _____________________________________________________  PHYSICAL  EXAM:  General/Constitutional: NAD, well developed, well nourished  HENT: Normocephalic, atraumatic  CV: Intact distal pulses, regular rate  Resp: No respiratory distress or labored breathing  Abdomen: soft, nondistended   Lymphatic: No lymphadenopathy palpated  Neuro: Alert and Oriented x 3, no focal deficits  Psych: Normal mood, normal affect  Skin: Warm, dry, no rashes, no erythema    MUSCULOSKELETAL EXAMINATION:    Right Lower Extremity:   Incision:  Clean, dry, and intact  Range of Motion: 0-90 no significant pain   Mild swelling   +EHL/FHL/TA/GS  SILT throughout  Palpable DP pulse     Shoulder Exam:     Inspection: No ecchymosis, edema, or deformity. No visualized wounds or skin lesions   Active Motion:       FF: 170        ER: 60        IR: T12  Strength: 5/5 empty can, 5/5 ER,  5/5 IR   Sensory - SILT in the Radial / Ulnar / Median / Axillary nerve distributions  Motor - AIN / PIN / Radial / Ulnar / Median / Axillary motor nerves in tact  Palpable Radial pulse  Cap refill <2secs in all digits  - [employer]      Scribe Attestation      I,:  Adelita Posey MA am acting as a scribe while in the presence of the attending physician.:       I,:  Charli Pickens MD personally performed the services described in this documentation    as scribed in my presence.:

## 2025-03-21 ENCOUNTER — OFFICE VISIT (OUTPATIENT)
Dept: PHYSICAL THERAPY | Facility: REHABILITATION | Age: 50
End: 2025-03-21
Payer: COMMERCIAL

## 2025-03-21 DIAGNOSIS — M25.561 ACUTE PAIN OF RIGHT KNEE: Primary | ICD-10-CM

## 2025-03-21 PROCEDURE — 97112 NEUROMUSCULAR REEDUCATION: CPT | Performed by: PHYSICAL THERAPIST

## 2025-03-21 PROCEDURE — 97110 THERAPEUTIC EXERCISES: CPT | Performed by: PHYSICAL THERAPIST

## 2025-03-21 PROCEDURE — 97140 MANUAL THERAPY 1/> REGIONS: CPT | Performed by: PHYSICAL THERAPIST

## 2025-03-21 NOTE — PROGRESS NOTES
"Daily Note     Today's date: 3/21/2025  Patient name: Radha Gonzalez  : 1975  MRN: [id number]  Referring provider: Charli Pickens*  Dx:   Encounter Diagnosis     ICD-10-CM    1. Acute pain of right knee  M25.561                      Subjective: patient states the knee continues to do well. The dr is happy with the progress      Objective: See treatment diary below      Assessment: Tolerated treatment well. Patient demonstrated fatigue post treatment, exhibited good technique with therapeutic exercises, and would benefit from continued PT Patient with improving knee ROM and strength. Patient is doing well. Surgeon is happy with progress      Plan: Continue per plan of care.      Precautions: meniscus repair 3/5/25      Manuals 3/7 3/10 3/14 3/17 3/21        PROM  done done done done                                               Neuro Re-Ed             Quad sets 5\"x10 5\"x10 5\"x10 5\"x10 5\"x10        bridges     3x10                                                                         Ther Ex             Heel slides 2x10 2x10 2x10 2x10x5\" 2x10x5\"        saq  2x10 3x10 1# 3x10 2# 3x10        laq  2x10 3x10 1# 3x10 2# 3x10        bike   5' rock 5' rock 5' rock        slr  2x10 3x10 3x10 3x10 1#        Calf s  10sx5 10sx5 10sx5 10sx5                                  Ther Activity             sts             Step ups             Gait Training                                       Modalities                                                  "

## 2025-03-24 ENCOUNTER — OFFICE VISIT (OUTPATIENT)
Dept: PHYSICAL THERAPY | Facility: REHABILITATION | Age: 50
End: 2025-03-24
Payer: COMMERCIAL

## 2025-03-24 DIAGNOSIS — M25.561 ACUTE PAIN OF RIGHT KNEE: Primary | ICD-10-CM

## 2025-03-24 PROCEDURE — 97140 MANUAL THERAPY 1/> REGIONS: CPT | Performed by: PHYSICAL THERAPIST

## 2025-03-24 PROCEDURE — 97110 THERAPEUTIC EXERCISES: CPT | Performed by: PHYSICAL THERAPIST

## 2025-03-24 PROCEDURE — 97112 NEUROMUSCULAR REEDUCATION: CPT | Performed by: PHYSICAL THERAPIST

## 2025-03-24 NOTE — PROGRESS NOTES
"Daily Note     Today's date: 3/24/2025  Patient name: Radha Gonzalez  : 1975  MRN: [id number]  Referring provider: Charli Pickens*  Dx:   Encounter Diagnosis     ICD-10-CM    1. Acute pain of right knee  M25.561                      Subjective: patient states her knee continues to do well      Objective: See treatment diary below      Assessment: Tolerated treatment well. Patient demonstrated fatigue post treatment, exhibited good technique with therapeutic exercises, and would benefit from continued PT Patient with improving knee ROM and strength. Patient is doing well at this point. We are waiting for 6 week tone to start progressing to weight bearing      Plan: Continue per plan of care.      Precautions: meniscus repair 3/5/25      Manuals 3/7 3/10 3/14 3/17 3/21 3/24       PROM  done done done done done                                              Neuro Re-Ed             Quad sets 5\"x10 5\"x10 5\"x10 5\"x10 5\"x10 5\"x10       bridges     3x10 3x10                                                                        Ther Ex             Heel slides 2x10 2x10 2x10 2x10x5\" 2x10x5\" 2x10x5\"       saq  2x10 3x10 1# 3x10 2# 3x10 2# 3x10       laq  2x10 3x10 1# 3x10 2# 3x10 2# 3x10       bike   5' rock 5' rock 5' rock 5' rock       slr  2x10 3x10 3x10 3x10 1# 3x10 2#       Calf s  10sx5 10sx5 10sx5 10sx5 10sx5       SL hip abd      3x10                    Ther Activity             sts             Step ups             Gait Training                                       Modalities                                                    "

## 2025-03-28 ENCOUNTER — OFFICE VISIT (OUTPATIENT)
Dept: PHYSICAL THERAPY | Facility: REHABILITATION | Age: 50
End: 2025-03-28
Payer: COMMERCIAL

## 2025-03-28 DIAGNOSIS — M25.561 ACUTE PAIN OF RIGHT KNEE: Primary | ICD-10-CM

## 2025-03-28 PROCEDURE — 97140 MANUAL THERAPY 1/> REGIONS: CPT | Performed by: PHYSICAL THERAPIST

## 2025-03-28 PROCEDURE — 97110 THERAPEUTIC EXERCISES: CPT | Performed by: PHYSICAL THERAPIST

## 2025-03-28 PROCEDURE — 97112 NEUROMUSCULAR REEDUCATION: CPT | Performed by: PHYSICAL THERAPIST

## 2025-03-28 NOTE — PROGRESS NOTES
"Daily Note     Today's date: 3/28/2025  Patient name: Radha Gonzalez  : 1975  MRN: [id number]  Referring provider: Charli Pickens*  Dx:   Encounter Diagnosis     ICD-10-CM    1. Acute pain of right knee  M25.561                      Subjective: patient states her knee continues to do well. It bends more every day      Objective: See treatment diary below      Assessment: Tolerated treatment well. Patient demonstrated fatigue post treatment, exhibited good technique with therapeutic exercises, and would benefit from continued PT Patient with improving knee ROM and strength. Patient able to make it around on the bik for the first time today      Plan: Continue per plan of care.      Precautions: meniscus repair 3/5/25      Manuals 3/7 3/10 3/14 3/17 3/21 3/24 3/28      PROM  done done done done done                                              Neuro Re-Ed             Quad sets 5\"x10 5\"x10 5\"x10 5\"x10 5\"x10 5\"x10       bridges     3x10 3x10 3x10                                                                       Ther Ex             Heel slides 2x10 2x10 2x10 2x10x5\" 2x10x5\" 2x10x5\" 2x10x5\"      saq  2x10 3x10 1# 3x10 2# 3x10 2# 3x10 2# 3x10      laq  2x10 3x10 1# 3x10 2# 3x10 2# 3x10 2# 3x10      bike   5' rock 5' rock 5' rock 5' rock 5' rock      slr  2x10 3x10 3x10 3x10 1# 3x10 2# 3x10 2#      Calf s  10sx5 10sx5 10sx5 10sx5 10sx5 10sx5      SL hip abd      3x10 3x10                   Ther Activity             sts             Step ups             Gait Training                                       Modalities                                                      "

## 2025-03-31 ENCOUNTER — OFFICE VISIT (OUTPATIENT)
Dept: PHYSICAL THERAPY | Facility: REHABILITATION | Age: 50
End: 2025-03-31
Payer: COMMERCIAL

## 2025-03-31 DIAGNOSIS — M25.561 ACUTE PAIN OF RIGHT KNEE: Primary | ICD-10-CM

## 2025-03-31 PROCEDURE — 97112 NEUROMUSCULAR REEDUCATION: CPT | Performed by: PHYSICAL THERAPIST

## 2025-03-31 PROCEDURE — 97110 THERAPEUTIC EXERCISES: CPT | Performed by: PHYSICAL THERAPIST

## 2025-03-31 NOTE — PROGRESS NOTES
"Daily Note     Today's date: 3/31/2025  Patient name: Radha Gonzalez  : 1975  MRN: [id number]  Referring provider: Charli Pickens*  Dx:   Encounter Diagnosis     ICD-10-CM    1. Acute pain of right knee  M25.561                      Subjective: patient states her knee has been a little more achy the last day or 2      Objective: See treatment diary below      Assessment: Tolerated treatment well. Patient demonstrated fatigue post treatment, exhibited good technique with therapeutic exercises, and would benefit from continued PT Patient continues to do well. She is having slightly more discomfort today but overall she is doing well      Plan: Continue per plan of care.      Precautions: meniscus repair 3/5/25      Manuals 3/7 3/10 3/14 3/17 3/21 3/24 3/28 3/31     PROM  done done done done done done done                                            Neuro Re-Ed             Quad sets 5\"x10 5\"x10 5\"x10 5\"x10 5\"x10 5\"x10       bridges     3x10 3x10 3x10 3x10                                                                      Ther Ex             Heel slides 2x10 2x10 2x10 2x10x5\" 2x10x5\" 2x10x5\" 2x10x5\" 2x10x5\"     saq  2x10 3x10 1# 3x10 2# 3x10 2# 3x10 2# 3x10 2# 3x10     laq  2x10 3x10 1# 3x10 2# 3x10 2# 3x10 2# 3x10 2# 3x10     bike   5' rock 5' rock 5' rock 5' rock 5' rock 5' rock     slr  2x10 3x10 3x10 3x10 1# 3x10 2# 3x10 2# 3x10 2#     Calf s  10sx5 10sx5 10sx5 10sx5 10sx5 10sx5 10sx5     SL hip abd      3x10 3x10 3x10                  Ther Activity             sts             Step ups             Gait Training                                       Modalities                                                        "

## 2025-04-02 ENCOUNTER — APPOINTMENT (OUTPATIENT)
Dept: PHYSICAL THERAPY | Facility: REHABILITATION | Age: 50
End: 2025-04-02
Payer: COMMERCIAL

## 2025-04-04 ENCOUNTER — APPOINTMENT (OUTPATIENT)
Dept: PHYSICAL THERAPY | Facility: REHABILITATION | Age: 50
End: 2025-04-04
Payer: COMMERCIAL

## 2025-04-07 ENCOUNTER — OFFICE VISIT (OUTPATIENT)
Dept: PHYSICAL THERAPY | Facility: REHABILITATION | Age: 50
End: 2025-04-07
Payer: COMMERCIAL

## 2025-04-07 DIAGNOSIS — M25.561 ACUTE PAIN OF RIGHT KNEE: Primary | ICD-10-CM

## 2025-04-07 PROCEDURE — 97110 THERAPEUTIC EXERCISES: CPT | Performed by: PHYSICAL THERAPIST

## 2025-04-07 PROCEDURE — 97112 NEUROMUSCULAR REEDUCATION: CPT | Performed by: PHYSICAL THERAPIST

## 2025-04-07 NOTE — PROGRESS NOTES
"Daily Note     Today's date: 2025  Patient name: Radha Gonzalez  : 1975  MRN: [id number]  Referring provider: Charli Pickens*  Dx:   Encounter Diagnosis     ICD-10-CM    1. Acute pain of right knee  M25.561                      Subjective: patient states her knee continues to do well      Objective: See treatment diary below      Assessment: Tolerated treatment well. Patient demonstrated fatigue post treatment, exhibited good technique with therapeutic exercises, and would benefit from continued PT Patient with improving knee ROM. Patient is doing well. Just waiting on 6 weeks for wbing      Plan: Continue per plan of care.      Precautions: meniscus repair 3/5/25    PT 1 on 1: 8:03-8:30      Manuals 3/7 3/10 3/14 3/17 3/21 3/24 3/28 3/31     PROM  done done done done done done done                                            Neuro Re-Ed             Quad sets 5\"x10 5\"x10 5\"x10 5\"x10 5\"x10 5\"x10       bridges     3x10 3x10 3x10 3x10                                                                      Ther Ex             Heel slides 2x10 2x10 2x10 2x10x5\" 2x10x5\" 2x10x5\" 2x10x5\" 2x10x5\"     saq  2x10 3x10 1# 3x10 2# 3x10 2# 3x10 2# 3x10 2# 3x10     laq  2x10 3x10 1# 3x10 2# 3x10 2# 3x10 2# 3x10 2# 3x10     bike   5' rock 5' rock 5' rock 5' rock 5' rock 5' rock     slr  2x10 3x10 3x10 3x10 1# 3x10 2# 3x10 2# 3x10 2#     Calf s  10sx5 10sx5 10sx5 10sx5 10sx5 10sx5 10sx5     SL hip abd      3x10 3x10 3x10                  Ther Activity             sts             Step ups             Gait Training                                       Modalities                                                          "

## 2025-04-11 ENCOUNTER — OFFICE VISIT (OUTPATIENT)
Dept: PHYSICAL THERAPY | Facility: REHABILITATION | Age: 50
End: 2025-04-11
Payer: COMMERCIAL

## 2025-04-11 DIAGNOSIS — M25.561 ACUTE PAIN OF RIGHT KNEE: Primary | ICD-10-CM

## 2025-04-11 PROCEDURE — 97110 THERAPEUTIC EXERCISES: CPT | Performed by: PHYSICAL THERAPIST

## 2025-04-11 PROCEDURE — 97112 NEUROMUSCULAR REEDUCATION: CPT | Performed by: PHYSICAL THERAPIST

## 2025-04-11 PROCEDURE — 97140 MANUAL THERAPY 1/> REGIONS: CPT | Performed by: PHYSICAL THERAPIST

## 2025-04-11 NOTE — PROGRESS NOTES
"Daily Note     Today's date: 2025  Patient name: Radha Gonzalez  : 1975  MRN: [id number]  Referring provider: Charli Pickens*  Dx:   Encounter Diagnosis     ICD-10-CM    1. Acute pain of right knee  M25.561                      Subjective: patient states her knee is a little more stiff today due to the weather      Objective: See treatment diary below      Assessment: Tolerated treatment well. Patient demonstrated fatigue post treatment, exhibited good technique with therapeutic exercises, and would benefit from continued PT Patient continues make improvements. Knee flexion ROM to 110 degrees. Quad strength doing well      Plan: Continue per plan of care.      Precautions: meniscus repair 3/5/25          Manuals 3/7 3/10 3/14 3/17 3/21 3/24 3/28 3/31 4/11    PROM  done done done done done done done done                                           Neuro Re-Ed             Quad sets 5\"x10 5\"x10 5\"x10 5\"x10 5\"x10 5\"x10       bridges     3x10 3x10 3x10 3x10 3x10                                                                     Ther Ex             Heel slides 2x10 2x10 2x10 2x10x5\" 2x10x5\" 2x10x5\" 2x10x5\" 2x10x5\" 2x10x5\"    saq  2x10 3x10 1# 3x10 2# 3x10 2# 3x10 2# 3x10 2# 3x10 3# 3x10    laq  2x10 3x10 1# 3x10 2# 3x10 2# 3x10 2# 3x10 2# 3x10 3# 3x10    bike   5' rock 5' rock 5' rock 5' rock 5' rock 5' rock 5' rock    slr  2x10 3x10 3x10 3x10 1# 3x10 2# 3x10 2# 3x10 2# 3x10 2#     Calf s  10sx5 10sx5 10sx5 10sx5 10sx5 10sx5 10sx5 10sx5    SL hip abd      3x10 3x10 3x10 3x10                 Ther Activity             sts             Step ups             Gait Training                                       Modalities                                                            "

## 2025-04-14 ENCOUNTER — OFFICE VISIT (OUTPATIENT)
Dept: PHYSICAL THERAPY | Facility: REHABILITATION | Age: 50
End: 2025-04-14
Attending: ORTHOPAEDIC SURGERY
Payer: COMMERCIAL

## 2025-04-14 DIAGNOSIS — M25.561 ACUTE PAIN OF RIGHT KNEE: Primary | ICD-10-CM

## 2025-04-14 PROCEDURE — 97112 NEUROMUSCULAR REEDUCATION: CPT | Performed by: PHYSICAL THERAPIST

## 2025-04-14 PROCEDURE — 97110 THERAPEUTIC EXERCISES: CPT | Performed by: PHYSICAL THERAPIST

## 2025-04-14 NOTE — PROGRESS NOTES
"Daily Note     Today's date: 2025  Patient name: Radha Gonzalez  : 1975  MRN: [id number]  Referring provider: Charli Pickens*  Dx:   Encounter Diagnosis     ICD-10-CM    1. Acute pain of right knee  M25.561                      Subjective: patient states her knee is still doing well. She can't wait to put weight on her leg      Objective: See treatment diary below      Assessment: Tolerated treatment well. Patient demonstrated fatigue post treatment, exhibited good technique with therapeutic exercises, and would benefit from continued PT  Patient with improving knee ROM and strength. Patient will be 6 weeks on Wednesday      Plan: Continue per plan of care.      Precautions: meniscus repair 3/5/25    PT 1 on 1: 7:35-7:58          Manuals 3/7 3/10 3/14 3/17 3/21 3/24 3/28 3/31 4/11 4/14   PROM  done done done done done done done done done                                          Neuro Re-Ed             Quad sets 5\"x10 5\"x10 5\"x10 5\"x10 5\"x10 5\"x10       bridges     3x10 3x10 3x10 3x10 3x10 3x10                                                                    Ther Ex             Heel slides 2x10 2x10 2x10 2x10x5\" 2x10x5\" 2x10x5\" 2x10x5\" 2x10x5\" 2x10x5\" 2x10x5\"   saq  2x10 3x10 1# 3x10 2# 3x10 2# 3x10 2# 3x10 2# 3x10 3# 3x10 3# 3x10   laq  2x10 3x10 1# 3x10 2# 3x10 2# 3x10 2# 3x10 2# 3x10 3# 3x10 3# 3x10   bike   5' rock 5' rock 5' rock 5' rock 5' rock 5' rock 5' rock 5' rock   slr  2x10 3x10 3x10 3x10 1# 3x10 2# 3x10 2# 3x10 2# 3x10 2#  3x10 3#   Calf s  10sx5 10sx5 10sx5 10sx5 10sx5 10sx5 10sx5 10sx5 10sx5   SL hip abd      3x10 3x10 3x10 3x10 3x10                Ther Activity             sts             Step ups             Gait Training                                       Modalities                                                              "

## 2025-04-18 ENCOUNTER — OFFICE VISIT (OUTPATIENT)
Dept: PHYSICAL THERAPY | Facility: REHABILITATION | Age: 50
End: 2025-04-18
Payer: COMMERCIAL

## 2025-04-18 DIAGNOSIS — M25.561 ACUTE PAIN OF RIGHT KNEE: Primary | ICD-10-CM

## 2025-04-18 PROCEDURE — 97112 NEUROMUSCULAR REEDUCATION: CPT | Performed by: PHYSICAL THERAPIST

## 2025-04-18 PROCEDURE — 97140 MANUAL THERAPY 1/> REGIONS: CPT | Performed by: PHYSICAL THERAPIST

## 2025-04-18 PROCEDURE — 97110 THERAPEUTIC EXERCISES: CPT | Performed by: PHYSICAL THERAPIST

## 2025-04-18 NOTE — PROGRESS NOTES
"Daily Note     Today's date: 2025  Patient name: Radha Gonzalez  : 1975  MRN: [id number]  Referring provider: Charli Pickens*  Dx:   Encounter Diagnosis     ICD-10-CM    1. Acute pain of right knee  M25.561                      Subjective: patient states the knee has been doing well. She is excited to be able to put weight on her knee next week      Objective: See treatment diary below      Assessment: Tolerated treatment well. Patient demonstrated fatigue post treatment, exhibited good technique with therapeutic exercises, and would benefit from continued PT Patient with improving knee ROM every session. Patient will hopefully be able to start weight bearing next week      Plan: Continue per plan of care.      Precautions: meniscus repair 3/5/25            Manuals 3/7 3/10 3/14 3/17 3/21 3/24 3/28 3/31 4/11 4/14 4/18   PROM  done done done done done done done done done done                                             Neuro Re-Ed              Quad sets 5\"x10 5\"x10 5\"x10 5\"x10 5\"x10 5\"x10        bridges     3x10 3x10 3x10 3x10 3x10 3x10 3x10                                                                         Ther Ex              Heel slides 2x10 2x10 2x10 2x10x5\" 2x10x5\" 2x10x5\" 2x10x5\" 2x10x5\" 2x10x5\" 2x10x5\" 2x10x5\"   saq  2x10 3x10 1# 3x10 2# 3x10 2# 3x10 2# 3x10 2# 3x10 3# 3x10 3# 3x10 3# 3x10   laq  2x10 3x10 1# 3x10 2# 3x10 2# 3x10 2# 3x10 2# 3x10 3# 3x10 3# 3x10 3# 3x10   bike   5' rock 5' rock 5' rock 5' rock 5' rock 5' rock 5' rock 5' rock 5' rock   slr  2x10 3x10 3x10 3x10 1# 3x10 2# 3x10 2# 3x10 2# 3x10 2#  3x10 3# 3x10 3#   Calf s  10sx5 10sx5 10sx5 10sx5 10sx5 10sx5 10sx5 10sx5 10sx5 10sx5   SL hip abd      3x10 3x10 3x10 3x10 3x10 3x10                 Ther Activity              sts              Step ups              Gait Training                                          Modalities                                                                   "

## 2025-04-21 ENCOUNTER — OFFICE VISIT (OUTPATIENT)
Dept: OBGYN CLINIC | Facility: CLINIC | Age: 50
End: 2025-04-21

## 2025-04-21 ENCOUNTER — OFFICE VISIT (OUTPATIENT)
Dept: PHYSICAL THERAPY | Facility: REHABILITATION | Age: 50
End: 2025-04-21
Attending: ORTHOPAEDIC SURGERY
Payer: COMMERCIAL

## 2025-04-21 ENCOUNTER — PATIENT MESSAGE (OUTPATIENT)
Dept: OBGYN CLINIC | Facility: CLINIC | Age: 50
End: 2025-04-21

## 2025-04-21 ENCOUNTER — TELEPHONE (OUTPATIENT)
Dept: OBGYN CLINIC | Facility: CLINIC | Age: 50
End: 2025-04-21

## 2025-04-21 VITALS — HEIGHT: 61 IN | BODY MASS INDEX: 32.49 KG/M2

## 2025-04-21 DIAGNOSIS — Z98.890 S/P MEDIAL MENISCUS REPAIR OF RIGHT KNEE: Primary | ICD-10-CM

## 2025-04-21 DIAGNOSIS — M25.561 ACUTE PAIN OF RIGHT KNEE: Primary | ICD-10-CM

## 2025-04-21 PROCEDURE — 99024 POSTOP FOLLOW-UP VISIT: CPT | Performed by: ORTHOPAEDIC SURGERY

## 2025-04-21 PROCEDURE — 97140 MANUAL THERAPY 1/> REGIONS: CPT | Performed by: PHYSICAL THERAPIST

## 2025-04-21 PROCEDURE — 97110 THERAPEUTIC EXERCISES: CPT | Performed by: PHYSICAL THERAPIST

## 2025-04-21 PROCEDURE — 97112 NEUROMUSCULAR REEDUCATION: CPT | Performed by: PHYSICAL THERAPIST

## 2025-04-21 NOTE — ASSESSMENT & PLAN NOTE
She is doing well postoperatively today. At this time, she may begin weightbearing as tolerated with the crutches. She may wean off the crutches as she feels comfortable. Once off the crutches, she may wean out of the brace she she feels comfortable. 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 She may resume full day shifts on 6/2/2025. 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 Is not cleared for physical duties yet. An update note and paperwork were completed for these.  She may use the brace as needed over her  uniform. She should continue her efforts at physical therapy. She will follow-up in 6 weeks for re-evaluation of the right knee.

## 2025-04-21 NOTE — LETTER
April 21, 2025     Patient: Radha Gonzalez  YOB: 1975  Date of Visit: 4/21/2025      To Whom it May Concern:    Radha Gonzalez is under my professional care. Radha was seen in my office on 4/21/2025. Radha may return to work with limitations on 5/27/2025. She may return to work with a half day schedule until 6/2/2025. She may resume full shifts on 6/2/2025 .    If you have any questions or concerns, please don't hesitate to call.         Sincerely,          Charli Pickens MD        CC: No Recipients

## 2025-04-21 NOTE — LETTER
April 21, 2025     Patient: Radha Gonzalez  YOB: 1975  Date of Visit: 4/21/2025      To Whom it May Concern:    Radha Gonzalez is under my professional care. Radha was seen in my office on 4/21/2025. Radha may return to work with limitations on 5/27/2025. 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 She may resume full shifts on 6/2/2025 .    If you have any questions or concerns, please don't hesitate to call.         Sincerely,          Charli Pickens MD        CC: No Recipients

## 2025-04-21 NOTE — PROGRESS NOTES
Patient Name: Radha Gonzalez      : 1975       MRN: [id number]   Encounter Provider: Charli Pickens MD   Encounter Date: 25  Encounter department: [facility]         Assessment & Plan  S/P medial meniscus repair of right knee  She is doing well postoperatively today. At this time, she may begin weightbearing as tolerated with the crutches. She may wean off the crutches as she feels comfortable. Once off the crutches, she may wean out of the brace she she feels comfortable. 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 She may resume full day shifts on 2025. 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 Is not cleared for physical duties yet. An update note and paperwork were completed for these.  She may use the brace as needed over her  uniform. She should continue her efforts at physical therapy. She will follow-up in 6 weeks for re-evaluation of the right knee.       _____________________________________________________  CHIEF COMPLAINT:  Chief Complaint   Patient presents with    Right Knee - Post-op         SUBJECTIVE:  Patient is a 50 y.o. year old female who presents for follow up now about 7 weeks s/p Right knee arthrosocpic medial meniscus root repair & chondroplasty - Right performed on 3/5/2025.  Today patient has no significant pain about the right knee. She has been attending physical therapy twice weekly and completing home exercises daily. She recently began putting her toe down when walking with the crutches. She states the bottom of her right foot has decreased sensation. She states the knee throbs slightly when putting the foot down. She discontinued use of the Aspirin last week for DVT prophylaxis. She rarely takes Naproxen at this time.     _____________________________________________________  PHYSICAL EXAM:  General/Constitutional: NAD, well  developed, well nourished  HENT: Normocephalic, atraumatic  CV: Intact distal pulses, regular rate  Resp: No respiratory distress or labored breathing  Abdomen: soft, nondistended   Lymphatic: No lymphadenopathy palpated  Neuro: Alert and Oriented x 3, no focal deficits  Psych: Normal mood, normal affect  Skin: Warm, dry, no rashes, no erythema    MUSCULOSKELETAL EXAMINATION:    Right Lower Extremity   Incision: Well-healed, Clean, dry, and intact  No effusion  Range of Motion: 0° to 12°  Patella tracks centrally  Calf compartments are soft and supple  Negative Chapincito's sign  2+ DP and PT pulses with brisk capillary refill to the toes  Sural, saphenous, tibial, superficial, and deep peroneal motor and sensory distributions intact  Sensation light touch intact distally     Scribe Attestation      I,:  Aliza Sahu am acting as a scribe while in the presence of the attending physician.:       I,:  Charli Pickens MD personally performed the services described in this documentation    as scribed in my presence.:

## 2025-04-21 NOTE — PROGRESS NOTES
"Daily Note     Today's date: 2025  Patient name: Radha Gonzalez  : 1975  MRN: [id number]  Referring provider: Charli Pickens*  Dx:   Encounter Diagnosis     ICD-10-CM    1. Acute pain of right knee  M25.561                      Subjective: patient states she went to the dr and she is now able to put weight on her leg now      Objective: See treatment diary below      Assessment: Tolerated treatment well. Patient demonstrated fatigue post treatment, exhibited good technique with therapeutic exercises, and would benefit from continued PT Patient did very well with putting weight on her LE. Patient was able to put 100% of her weight on R LE      Plan: Continue per plan of care.      Precautions: meniscus repair 3/5/25            Manuals 3/28 3/31 4/11 4/14 4/18 4/21   PROM done done done done done done                              Neuro Re-Ed         Quad sets         bridges 3x10 3x10 3x10 3x10 3x10 3x10 10#                                                Ther Ex         Heel slides 2x10x5\" 2x10x5\" 2x10x5\" 2x10x5\" 2x10x5\" 2x10x5\"   saq 2# 3x10 2# 3x10 3# 3x10 3# 3x10 3# 3x10    laq 2# 3x10 2# 3x10 3# 3x10 3# 3x10 3# 3x10 3# 3x10   bike 5' rock 5' rock 5' rock 5' rock 5' rock 5 full   slr 3x10 2# 3x10 2# 3x10 2#  3x10 3# 3x10 3# 3x10 3#   Calf s 10sx5 10sx5 10sx5 10sx5 10sx5    SL hip abd 3x10 3x10 3x10 3x10 3x10             Ther Activity         sts         Weight shifts      done   Heel raises      3x10   Step ups         Gait Training                           Modalities                                                      "

## 2025-04-25 ENCOUNTER — OFFICE VISIT (OUTPATIENT)
Dept: PHYSICAL THERAPY | Facility: REHABILITATION | Age: 50
End: 2025-04-25
Payer: COMMERCIAL

## 2025-04-25 DIAGNOSIS — M25.561 ACUTE PAIN OF RIGHT KNEE: Primary | ICD-10-CM

## 2025-04-25 PROCEDURE — 97110 THERAPEUTIC EXERCISES: CPT | Performed by: PHYSICAL THERAPIST

## 2025-04-25 PROCEDURE — 97530 THERAPEUTIC ACTIVITIES: CPT | Performed by: PHYSICAL THERAPIST

## 2025-04-25 PROCEDURE — 97112 NEUROMUSCULAR REEDUCATION: CPT | Performed by: PHYSICAL THERAPIST

## 2025-04-25 NOTE — PROGRESS NOTES
"Daily Note     Today's date: 2025  Patient name: Radha Gonzalez  : 1975  MRN: [id number]  Referring provider: Charli Pickens*  Dx:   Encounter Diagnosis     ICD-10-CM    1. Acute pain of right knee  M25.561                      Subjective: patient states her leg is swelling a little more now but overall it is doing well      Objective: See treatment diary below      Assessment: Tolerated treatment well. Patient demonstrated fatigue post treatment, exhibited good technique with therapeutic exercises, and would benefit from continued PTPatient is doing very well. Able to tolerate some functional strengthening. Able to walk without crutches today      Plan: Continue per plan of care.      Precautions: meniscus repair 3/5/25            Manuals 3/28 3/31 4/11 4/14 4/18 4/21 4/25   PROM done done done done done done done                                 Neuro Re-Ed          Quad sets          bridges 3x10 3x10 3x10 3x10 3x10 3x10 10# 3x10 10#                                                     Ther Ex          Heel slides 2x10x5\" 2x10x5\" 2x10x5\" 2x10x5\" 2x10x5\" 2x10x5\" 2x10x5\"   saq 2# 3x10 2# 3x10 3# 3x10 3# 3x10 3# 3x10     laq 2# 3x10 2# 3x10 3# 3x10 3# 3x10 3# 3x10 3# 3x10 3# 3x10   bike 5' rock 5' rock 5' rock 5' rock 5' rock 5 full 5 full   slr 3x10 2# 3x10 2# 3x10 2#  3x10 3# 3x10 3# 3x10 3# 3x10 3#   Calf s 10sx5 10sx5 10sx5 10sx5 10sx5     SL hip abd 3x10 3x10 3x10 3x10 3x10               Ther Activity          sts       Mini- squat 2x10   Weight shifts      done    Heel raises      3x10 3x10   Step ups       4\" 2x10   Gait Training          No crutch                    Modalities                                                           "

## 2025-04-28 ENCOUNTER — OFFICE VISIT (OUTPATIENT)
Dept: PHYSICAL THERAPY | Facility: REHABILITATION | Age: 50
End: 2025-04-28
Attending: ORTHOPAEDIC SURGERY
Payer: COMMERCIAL

## 2025-04-28 DIAGNOSIS — M25.561 ACUTE PAIN OF RIGHT KNEE: Primary | ICD-10-CM

## 2025-04-28 PROCEDURE — 97110 THERAPEUTIC EXERCISES: CPT | Performed by: PHYSICAL THERAPIST

## 2025-04-28 PROCEDURE — 97112 NEUROMUSCULAR REEDUCATION: CPT | Performed by: PHYSICAL THERAPIST

## 2025-04-28 PROCEDURE — 97530 THERAPEUTIC ACTIVITIES: CPT | Performed by: PHYSICAL THERAPIST

## 2025-04-28 NOTE — PROGRESS NOTES
"Daily Note     Today's date: 2025  Patient name: Radha Gonzalez  : 1975  MRN: [id number]  Referring provider: Charli Pickens*  Dx:   Encounter Diagnosis     ICD-10-CM    1. Acute pain of right knee  M25.561                      Subjective: patient states her knee is doing well. She is out of the brace      Objective: See treatment diary below      Assessment: Tolerated treatment well. Patient demonstrated fatigue post treatment, exhibited good technique with therapeutic exercises, and would benefit from continued PT Patient is doing very well. She is now walking without crutches or brace      Plan: Continue per plan of care.      Precautions: meniscus repair 3/5/25            Manuals 3/28 3/31 4/11 4/14 4/18 4/21 4/25 4/28   PROM done done done done done done done done                                    Neuro Re-Ed           Quad sets           bridges 3x10 3x10 3x10 3x10 3x10 3x10 10# 3x10 10# 3x10 12#   SLS        10\"x5                                               Ther Ex           Heel slides 2x10x5\" 2x10x5\" 2x10x5\" 2x10x5\" 2x10x5\" 2x10x5\" 2x10x5\" 2x10   saq 2# 3x10 2# 3x10 3# 3x10 3# 3x10 3# 3x10      laq 2# 3x10 2# 3x10 3# 3x10 3# 3x10 3# 3x10 3# 3x10 3# 3x10 3# 3x10   bike 5' rock 5' rock 5' rock 5' rock 5' rock 5 full 5 full 5' full   slr 3x10 2# 3x10 2# 3x10 2#  3x10 3# 3x10 3# 3x10 3# 3x10 3# 3x10 3#   Calf s 10sx5 10sx5 10sx5 10sx5 10sx5      SL hip abd 3x10 3x10 3x10 3x10 3x10                 Ther Activity           sts       Mini- squat 2x10 To table 3x10   Weight shifts      done     Heel raises      3x10 3x10 3x10   Step ups       4\" 2x10 6\" 3x10   Gait Training           No crutch                      Modalities                                                                "

## 2025-04-30 ENCOUNTER — PATIENT MESSAGE (OUTPATIENT)
Dept: FAMILY MEDICINE CLINIC | Facility: CLINIC | Age: 50
End: 2025-04-30

## 2025-04-30 DIAGNOSIS — Z11.1 SCREENING-PULMONARY TB: Primary | ICD-10-CM

## 2025-05-02 ENCOUNTER — OFFICE VISIT (OUTPATIENT)
Dept: PHYSICAL THERAPY | Facility: REHABILITATION | Age: 50
End: 2025-05-02
Attending: ORTHOPAEDIC SURGERY
Payer: COMMERCIAL

## 2025-05-02 DIAGNOSIS — M25.561 ACUTE PAIN OF RIGHT KNEE: Primary | ICD-10-CM

## 2025-05-02 PROCEDURE — 97112 NEUROMUSCULAR REEDUCATION: CPT | Performed by: PHYSICAL THERAPIST

## 2025-05-02 PROCEDURE — 97530 THERAPEUTIC ACTIVITIES: CPT | Performed by: PHYSICAL THERAPIST

## 2025-05-02 PROCEDURE — 97110 THERAPEUTIC EXERCISES: CPT | Performed by: PHYSICAL THERAPIST

## 2025-05-02 NOTE — PROGRESS NOTES
"Daily Note     Today's date: 2025  Patient name: Radha Gonzalez  : 1975  MRN: [id number]  Referring provider: Charli Pickens*  Dx:   Encounter Diagnosis     ICD-10-CM    1. Acute pain of right knee  M25.561                      Subjective: patient states she did some walking yesterday and her knee is a little sore      Objective: See treatment diary below      Assessment: Tolerated treatment well. Patient demonstrated fatigue post treatment, exhibited good technique with therapeutic exercises, and would benefit from continued PT  Patient with more tightness today so didn't progress anything today. Advised patient to cut back on the walking slightly to decrease irritability      Plan: Continue per plan of care.      Precautions: meniscus repair 3/5/25            Manuals 3/28 3/31 4/11 4/14 4/18 4/21 4/25 4/28 5/2   PROM done done done done done done done done done                                       Neuro Re-Ed            Quad sets            bridges 3x10 3x10 3x10 3x10 3x10 3x10 10# 3x10 10# 3x10 12# 3x10 12#    SLS        10\"x5 10sx5                                                   Ther Ex            Heel slides 2x10x5\" 2x10x5\" 2x10x5\" 2x10x5\" 2x10x5\" 2x10x5\" 2x10x5\" 2x10 2x10   saq 2# 3x10 2# 3x10 3# 3x10 3# 3x10 3# 3x10       laq 2# 3x10 2# 3x10 3# 3x10 3# 3x10 3# 3x10 3# 3x10 3# 3x10 3# 3x10 5# 3x10   bike 5' rock 5' rock 5' rock 5' rock 5' rock 5 full 5 full 5' full 5'full   slr 3x10 2# 3x10 2# 3x10 2#  3x10 3# 3x10 3# 3x10 3# 3x10 3# 3x10 3# 3x10 3#   Calf s 10sx5 10sx5 10sx5 10sx5 10sx5       SL hip abd 3x10 3x10 3x10 3x10 3x10                   Ther Activity            sts       Mini- squat 2x10 To table 3x10 To table 3x10   Weight shifts      done      Heel raises      3x10 3x10 3x10 3x10   Step ups       4\" 2x10 6\" 3x10 6\" 3x10   Gait Training            No crutch                        Modalities                                                                     "

## 2025-05-05 ENCOUNTER — OFFICE VISIT (OUTPATIENT)
Dept: PHYSICAL THERAPY | Facility: REHABILITATION | Age: 50
End: 2025-05-05
Attending: ORTHOPAEDIC SURGERY
Payer: COMMERCIAL

## 2025-05-05 DIAGNOSIS — M25.561 ACUTE PAIN OF RIGHT KNEE: Primary | ICD-10-CM

## 2025-05-05 PROCEDURE — 97110 THERAPEUTIC EXERCISES: CPT | Performed by: PHYSICAL THERAPIST

## 2025-05-05 PROCEDURE — 97112 NEUROMUSCULAR REEDUCATION: CPT | Performed by: PHYSICAL THERAPIST

## 2025-05-05 PROCEDURE — 97530 THERAPEUTIC ACTIVITIES: CPT | Performed by: PHYSICAL THERAPIST

## 2025-05-05 NOTE — PROGRESS NOTES
"Daily Note     Today's date: 2025  Patient name: Radha Gonzalez  : 1975  MRN: [id number]  Referring provider: Charli Pickens*  Dx:   Encounter Diagnosis     ICD-10-CM    1. Acute pain of right knee  M25.561                      Subjective: patient states she was doing more over the weekend so her knee was a little more sore and swollen but overall it did well      Objective: See treatment diary below      Assessment: Tolerated treatment well. Patient demonstrated fatigue post treatment, exhibited good technique with therapeutic exercises, and would benefit from continued PT Patient with slight increase in discomfort this weekend but it was expected with what she was doing. Patient continues to progress well      Plan: Continue per plan of care.      Precautions: meniscus repair 3/5/25            Manuals 3/28 3/31 4/11 4/14 4/18 4/21 4/25 4/28 5/2 5/5   PROM done done done done done done done done done done                                          Neuro Re-Ed             Quad sets             bridges 3x10 3x10 3x10 3x10 3x10 3x10 10# 3x10 10# 3x10 12# 3x10 12#  3x10 15#   SLS        10\"x5 10sx5 15s  x4                                                       Ther Ex             Heel slides 2x10x5\" 2x10x5\" 2x10x5\" 2x10x5\" 2x10x5\" 2x10x5\" 2x10x5\" 2x10 2x10 2x10   saq 2# 3x10 2# 3x10 3# 3x10 3# 3x10 3# 3x10        laq 2# 3x10 2# 3x10 3# 3x10 3# 3x10 3# 3x10 3# 3x10 3# 3x10 3# 3x10 5# 3x10 5# 3x10   bike 5' rock 5' rock 5' rock 5' rock 5' rock 5 full 5 full 5' full 5'full 5' full   slr 3x10 2# 3x10 2# 3x10 2#  3x10 3# 3x10 3# 3x10 3# 3x10 3# 3x10 3# 3x10 3# 3x10 3#   Calf s 10sx5 10sx5 10sx5 10sx5 10sx5        SL hip abd 3x10 3x10 3x10 3x10 3x10        Leg press          65# 3x10   Ther Activity             sts       Mini- squat 2x10 To table 3x10 To table 3x10 To table 3x10   Weight shifts      done       Heel raises      3x10 3x10 3x10 3x10 3x10   Step ups       4\" 2x10 6\" 3x10 6\" 3x10 6\" 3x10   Gait " Training             No crutch                          Modalities

## 2025-05-09 ENCOUNTER — OFFICE VISIT (OUTPATIENT)
Dept: PHYSICAL THERAPY | Facility: REHABILITATION | Age: 50
End: 2025-05-09
Attending: ORTHOPAEDIC SURGERY
Payer: COMMERCIAL

## 2025-05-09 DIAGNOSIS — M25.561 ACUTE PAIN OF RIGHT KNEE: Primary | ICD-10-CM

## 2025-05-09 PROCEDURE — 97110 THERAPEUTIC EXERCISES: CPT | Performed by: PHYSICAL THERAPIST

## 2025-05-09 PROCEDURE — 97112 NEUROMUSCULAR REEDUCATION: CPT | Performed by: PHYSICAL THERAPIST

## 2025-05-09 PROCEDURE — 97530 THERAPEUTIC ACTIVITIES: CPT | Performed by: PHYSICAL THERAPIST

## 2025-05-09 NOTE — PROGRESS NOTES
"Daily Note     Today's date: 2025  Patient name: Radha Gonzalez  : 1975  MRN: [id number]  Referring provider: Charli Pickens*  Dx:   Encounter Diagnosis     ICD-10-CM    1. Acute pain of right knee  M25.561                      Subjective: patient states she is still having pain when she is on her feet for longer distances but she is tolerating more      Objective: See treatment diary below      Assessment: Tolerated treatment well. Patient demonstrated fatigue post treatment, exhibited good technique with therapeutic exercises, and would benefit from continued PT Patient with improving tolerance to strength. Patient is progressing well but she still has weakness and decreased tolerance to certain activities      Plan: Continue per plan of care.      Precautions: meniscus repair 3/5/25            Manuals 3/28 3/31 4/11 4/14 4/18 4/21 4/25 4/28 5/2 5/5 5/9   PROM done done done done done done done done done done done                                             Neuro Re-Ed              Quad sets              bridges 3x10 3x10 3x10 3x10 3x10 3x10 10# 3x10 10# 3x10 12# 3x10 12#  3x10 15# 3x10 15#   SLS        10\"x5 10sx5 15s  x4 20sx3                                                           Ther Ex              Heel slides 2x10x5\" 2x10x5\" 2x10x5\" 2x10x5\" 2x10x5\" 2x10x5\" 2x10x5\" 2x10 2x10 2x10 2x10   saq 2# 3x10 2# 3x10 3# 3x10 3# 3x10 3# 3x10         laq 2# 3x10 2# 3x10 3# 3x10 3# 3x10 3# 3x10 3# 3x10 3# 3x10 3# 3x10 5# 3x10 5# 3x10 5# 3x10   bike 5' rock 5' rock 5' rock 5' rock 5' rock 5 full 5 full 5' full 5'full 5' full 5' full   slr 3x10 2# 3x10 2# 3x10 2#  3x10 3# 3x10 3# 3x10 3# 3x10 3# 3x10 3# 3x10 3# 3x10 3# 3x10 3#   Calf s 10sx5 10sx5 10sx5 10sx5 10sx5         SL hip abd 3x10 3x10 3x10 3x10 3x10         Knee ext machine           15# 3x10   Leg press          55# 3x10 55# 3x10   Ther Activity              sts       Mini- squat 2x10 To table 3x10 To table 3x10 To table 3x10 3x10   Weight " "shifts      done        Heel raises      3x10 3x10 3x10 3x10 3x10 3x10   Step ups       4\" 2x10 6\" 3x10 6\" 3x10 6\" 3x10 6\"3x10   Gait Training              No crutch                            Modalities                                                                               "

## 2025-05-20 ENCOUNTER — OFFICE VISIT (OUTPATIENT)
Dept: PHYSICAL THERAPY | Facility: REHABILITATION | Age: 50
End: 2025-05-20
Attending: ORTHOPAEDIC SURGERY
Payer: COMMERCIAL

## 2025-05-20 DIAGNOSIS — M25.561 ACUTE PAIN OF RIGHT KNEE: Primary | ICD-10-CM

## 2025-05-20 PROCEDURE — 97530 THERAPEUTIC ACTIVITIES: CPT | Performed by: PHYSICAL THERAPIST

## 2025-05-20 PROCEDURE — 97110 THERAPEUTIC EXERCISES: CPT | Performed by: PHYSICAL THERAPIST

## 2025-05-20 PROCEDURE — 97112 NEUROMUSCULAR REEDUCATION: CPT | Performed by: PHYSICAL THERAPIST

## 2025-05-20 NOTE — PROGRESS NOTES
"Daily Note     Today's date: 2025  Patient name: Radha Gonzalez  : 1975  MRN: [id number]  Referring provider: Charli Pickens*  Dx:   Encounter Diagnosis     ICD-10-CM    1. Acute pain of right knee  M25.561                      Subjective: patient states her knee was sore as expected on the cruise but overall she did well      Objective: See treatment diary below      Assessment: Tolerated treatment well. Patient demonstrated fatigue post treatment, exhibited good technique with therapeutic exercises, and would benefit from continued PT Patient had some soreness with her trip which was expected. Overall she is doing well. Knee ROM is approaching WNL. Knee strength still needs work      Plan: Continue per plan of care.      Precautions: meniscus repair 3/5/25            Manuals 3/28 3/31 4/11 4/14 4/18 4/21 4/25 4/28 5/2 5/5 5/9 5/20   PROM done done done done done done done done done done done done                                                Neuro Re-Ed               Quad sets               bridges 3x10 3x10 3x10 3x10 3x10 3x10 10# 3x10 10# 3x10 12# 3x10 12#  3x10 15# 3x10 15# 3x10 15#   SLS        10\"x5 10sx5 15s  x4 20sx3 20sx3                                                               Ther Ex               Heel slides 2x10x5\" 2x10x5\" 2x10x5\" 2x10x5\" 2x10x5\" 2x10x5\" 2x10x5\" 2x10 2x10 2x10 2x10 2x10   saq 2# 3x10 2# 3x10 3# 3x10 3# 3x10 3# 3x10          laq 2# 3x10 2# 3x10 3# 3x10 3# 3x10 3# 3x10 3# 3x10 3# 3x10 3# 3x10 5# 3x10 5# 3x10 5# 3x10 5# 3x10   bike 5' rock 5' rock 5' rock 5' rock 5' rock 5 full 5 full 5' full 5'full 5' full 5' full 5' full   slr 3x10 2# 3x10 2# 3x10 2#  3x10 3# 3x10 3# 3x10 3# 3x10 3# 3x10 3# 3x10 3# 3x10 3# 3x10 3# 3x10 3#   Calf s 10sx5 10sx5 10sx5 10sx5 10sx5          SL hip abd 3x10 3x10 3x10 3x10 3x10          Knee ext machine           15# 3x10 15# 3x10   Leg press          55# 3x10 55# 3x10 65# 3x10   Ther Activity               sts       Mini- squat 2x10 " "To table 3x10 To table 3x10 To table 3x10 3x10 3x10   Weight shifts      done         Heel raises      3x10 3x10 3x10 3x10 3x10 3x10 3x10   Step ups       4\" 2x10 6\" 3x10 6\" 3x10 6\" 3x10 6\"3x10 6\"3x10   Gait Training               No crutch                              Modalities                                                                                    "

## 2025-05-23 ENCOUNTER — OFFICE VISIT (OUTPATIENT)
Dept: PHYSICAL THERAPY | Facility: REHABILITATION | Age: 50
End: 2025-05-23
Attending: ORTHOPAEDIC SURGERY
Payer: COMMERCIAL

## 2025-05-23 DIAGNOSIS — M25.561 ACUTE PAIN OF RIGHT KNEE: Primary | ICD-10-CM

## 2025-05-23 PROCEDURE — 97112 NEUROMUSCULAR REEDUCATION: CPT | Performed by: PHYSICAL THERAPIST

## 2025-05-23 PROCEDURE — 97110 THERAPEUTIC EXERCISES: CPT | Performed by: PHYSICAL THERAPIST

## 2025-05-23 PROCEDURE — 97530 THERAPEUTIC ACTIVITIES: CPT | Performed by: PHYSICAL THERAPIST

## 2025-05-23 NOTE — PROGRESS NOTES
"Daily Note     Today's date: 2025  Patient name: Radha Gonzalez  : 1975  MRN: [id number]  Referring provider: Charli Pickens*  Dx:   Encounter Diagnosis     ICD-10-CM    1. Acute pain of right knee  M25.561                      Subjective: patient states overall she is doing well. She is still having issues going down stairs      Objective: See treatment diary below      Assessment: Tolerated treatment well. Patient demonstrated fatigue post treatment, exhibited good technique with therapeutic exercises, and would benefit from continued PT Patient with close to full knee ROM. Quad strength is limited but improving. Patient will return to work next week so that will be a good test      Plan: Continue per plan of care.      Precautions: meniscus repair 3/5/25            Manuals    PROM done done done done done done done                                 Neuro Re-Ed          Quad sets          bridges 3x10 10# 3x10 12# 3x10 12#  3x10 15# 3x10 15# 3x10 15# 3x10 15#   SLS  10\"x5 10sx5 15s  x4 20sx3 20sx3 20sx3                                           Ther Ex          Heel slides 2x10x5\" 2x10 2x10 2x10 2x10 2x10 2x10   saq          laq 3# 3x10 3# 3x10 5# 3x10 5# 3x10 5# 3x10 5# 3x10    bike 5 full 5' full 5'full 5' full 5' full 5' full 5' full   slr 3x10 3# 3x10 3# 3x10 3# 3x10 3# 3x10 3# 3x10 3# 3x10 3#   Calf s          SL hip abd          Knee ext machine     15# 3x10 15# 3x10 15# 3x10   Leg press    55# 3x10 55# 3x10 65# 3x10 65# 3x10   Ther Activity          sts Mini- squat 2x10 To table 3x10 To table 3x10 To table 3x10 3x10 3x10 3x10   Weight shifts          Heel raises 3x10 3x10 3x10 3x10 3x10 3x10 3x10   Step ups 4\" 2x10 6\" 3x10 6\" 3x10 6\" 3x10 6\"3x10 6\"3x10 6\" 3x10  4\" 2x10 step down   Gait Training          No crutch                    Modalities                                                                       "

## 2025-05-27 ENCOUNTER — OFFICE VISIT (OUTPATIENT)
Dept: PHYSICAL THERAPY | Facility: REHABILITATION | Age: 50
End: 2025-05-27
Attending: ORTHOPAEDIC SURGERY
Payer: COMMERCIAL

## 2025-05-27 DIAGNOSIS — M25.561 ACUTE PAIN OF RIGHT KNEE: Primary | ICD-10-CM

## 2025-05-27 PROCEDURE — 97112 NEUROMUSCULAR REEDUCATION: CPT | Performed by: PHYSICAL THERAPIST

## 2025-05-27 PROCEDURE — 97110 THERAPEUTIC EXERCISES: CPT | Performed by: PHYSICAL THERAPIST

## 2025-05-27 PROCEDURE — 97530 THERAPEUTIC ACTIVITIES: CPT | Performed by: PHYSICAL THERAPIST

## 2025-05-27 NOTE — PROGRESS NOTES
"Daily Note     Today's date: 2025  Patient name: Radha Gonzalez  : 1975  MRN: [id number]  Referring provider: Charli Pickens*  Dx:   Encounter Diagnosis     ICD-10-CM    1. Acute pain of right knee  M25.561                      Subjective: patient states today was her first day back to work and she is sore but not bad      Objective: See treatment diary below      Assessment: Tolerated treatment well. Patient demonstrated fatigue post treatment, exhibited good technique with therapeutic exercises, and would benefit from continued PT Patient with close to full knee ROM. Kept the plan about the same today since today was her first day back to work      Plan: Continue per plan of care.      Precautions: meniscus repair 3/5/25            Manuals    PROM done done done done done done done done                                    Neuro Re-Ed           Quad sets           bridges 3x10 10# 3x10 12# 3x10 12#  3x10 15# 3x10 15# 3x10 15# 3x10 15# 3x10 15#    SLS  10\"x5 10sx5 15s  x4 20sx3 20sx3 20sx3 20sx3                                               Ther Ex           Heel slides 2x10x5\" 2x10 2x10 2x10 2x10 2x10 2x10 2x10   saq           laq 3# 3x10 3# 3x10 5# 3x10 5# 3x10 5# 3x10 5# 3x10     bike 5 full 5' full 5'full 5' full 5' full 5' full 5' full 5' full   slr 3x10 3# 3x10 3# 3x10 3# 3x10 3# 3x10 3# 3x10 3# 3x10 3# 3x10 3#   Calf s           SL hip abd           Knee ext machine     15# 3x10 15# 3x10 15# 3x10 15# 3x10   Leg press    55# 3x10 55# 3x10 65# 3x10 65# 3x10 65# 3x10   Ther Activity           sts Mini- squat 2x10 To table 3x10 To table 3x10 To table 3x10 3x10 3x10 3x10 3x10   Weight shifts           Heel raises 3x10 3x10 3x10 3x10 3x10 3x10 3x10 3x10   Step ups 4\" 2x10 6\" 3x10 6\" 3x10 6\" 3x10 6\"3x10 6\"3x10 6\" 3x10  4\" 2x10 step down 6\" 3x10  4\" 2x10 step down   Gait Training           No crutch                      Modalities                               "

## 2025-05-30 ENCOUNTER — OFFICE VISIT (OUTPATIENT)
Dept: PHYSICAL THERAPY | Facility: REHABILITATION | Age: 50
End: 2025-05-30
Attending: ORTHOPAEDIC SURGERY
Payer: COMMERCIAL

## 2025-05-30 DIAGNOSIS — M25.561 ACUTE PAIN OF RIGHT KNEE: Primary | ICD-10-CM

## 2025-05-30 PROCEDURE — 97112 NEUROMUSCULAR REEDUCATION: CPT | Performed by: PHYSICAL THERAPIST

## 2025-05-30 PROCEDURE — 97530 THERAPEUTIC ACTIVITIES: CPT | Performed by: PHYSICAL THERAPIST

## 2025-05-30 PROCEDURE — 97110 THERAPEUTIC EXERCISES: CPT | Performed by: PHYSICAL THERAPIST

## 2025-05-30 NOTE — PROGRESS NOTES
"Daily Note     Today's date: 2025  Patient name: Radha Gonzalez  : 1975  MRN: [id number]  Referring provider: Charli Pickens*  Dx:   Encounter Diagnosis     ICD-10-CM    1. Acute pain of right knee  M25.561                      Subjective: patient states her knee was a little more sore today with returning to work but it wasn't too bad      Objective: See treatment diary below      Assessment: Tolerated treatment well. Patient demonstrated fatigue post treatment, exhibited good technique with therapeutic exercises, and would benefit from continued PTPatient with good tolerance to first week back to work. Plan to progress to some light plyos next week if tolerated      Plan: Continue per plan of care.      Precautions: meniscus repair 3/5/25            Manuals    PROM done done done done done done done done done                                       Neuro Re-Ed            Quad sets            bridges 3x10 10# 3x10 12# 3x10 12#  3x10 15# 3x10 15# 3x10 15# 3x10 15# 3x10 15#  3x10 15#    SLS  10\"x5 10sx5 15s  x4 20sx3 20sx3 20sx3 20sx3 20sx3                                                   Ther Ex            Heel slides 2x10x5\" 2x10 2x10 2x10 2x10 2x10 2x10 2x10 2x10   saq            laq 3# 3x10 3# 3x10 5# 3x10 5# 3x10 5# 3x10 5# 3x10      bike 5 full 5' full 5'full 5' full 5' full 5' full 5' full 5' full 5' full   slr 3x10 3# 3x10 3# 3x10 3# 3x10 3# 3x10 3# 3x10 3# 3x10 3# 3x10 3# 3x10 3#   Calf s            SL hip abd            Knee ext machine     15# 3x10 15# 3x10 15# 3x10 15# 3x10 15# 3x10   Leg press    55# 3x10 55# 3x10 65# 3x10 65# 3x10 65# 3x10 75# 3x10   Ther Activity            sts Mini- squat 2x10 To table 3x10 To table 3x10 To table 3x10 3x10 3x10 3x10 3x10 10# 3x10   Weight shifts            Heel raises 3x10 3x10 3x10 3x10 3x10 3x10 3x10 3x10 3x10   Step ups 4\" 2x10 6\" 3x10 6\" 3x10 6\" 3x10 6\"3x10 6\"3x10 6\" 3x10  4\" 2x10 step down 6\" 3x10  4\" " "2x10 step down 6\" 3x10   Gait Training            No crutch                        Modalities                                                                                 "

## 2025-06-02 ENCOUNTER — OFFICE VISIT (OUTPATIENT)
Dept: OBGYN CLINIC | Facility: CLINIC | Age: 50
End: 2025-06-02

## 2025-06-02 ENCOUNTER — OFFICE VISIT (OUTPATIENT)
Dept: PHYSICAL THERAPY | Facility: REHABILITATION | Age: 50
End: 2025-06-02
Attending: ORTHOPAEDIC SURGERY
Payer: COMMERCIAL

## 2025-06-02 VITALS — HEIGHT: 61 IN | WEIGHT: 177 LBS | BODY MASS INDEX: 33.42 KG/M2

## 2025-06-02 DIAGNOSIS — M24.811 INTERNAL DERANGEMENT OF RIGHT SHOULDER: ICD-10-CM

## 2025-06-02 DIAGNOSIS — Z98.890 S/P MEDIAL MENISCUS REPAIR OF RIGHT KNEE: Primary | ICD-10-CM

## 2025-06-02 DIAGNOSIS — F40.240 CLAUSTROPHOBIA: ICD-10-CM

## 2025-06-02 DIAGNOSIS — M25.561 ACUTE PAIN OF RIGHT KNEE: Primary | ICD-10-CM

## 2025-06-02 PROCEDURE — 99024 POSTOP FOLLOW-UP VISIT: CPT | Performed by: ORTHOPAEDIC SURGERY

## 2025-06-02 PROCEDURE — 97110 THERAPEUTIC EXERCISES: CPT | Performed by: PHYSICAL THERAPIST

## 2025-06-02 PROCEDURE — 97112 NEUROMUSCULAR REEDUCATION: CPT | Performed by: PHYSICAL THERAPIST

## 2025-06-02 PROCEDURE — 97530 THERAPEUTIC ACTIVITIES: CPT | Performed by: PHYSICAL THERAPIST

## 2025-06-02 RX ORDER — ALPRAZOLAM 0.5 MG
0.5 TABLET ORAL ONCE
Qty: 1 TABLET | Refills: 0 | Status: SHIPPED | OUTPATIENT
Start: 2025-06-02 | End: 2025-06-02

## 2025-06-02 NOTE — PROGRESS NOTES
Patient Name: Radha Gonzalez      : 1975       MRN: [id number]   Encounter Provider: Charli Pickens MD   Encounter Date: 25  Encounter department: [facility]         Assessment & Plan  S/P medial meniscus repair of right knee  Radha is nearly 13 weeks s/p right medial meniscus root repair and chondroplasty. I believe the patient continues to progress appropriately and I am pleased with her clinical presentation today. We discussed that she may expect occasional achy sensations as she has some arthritic changes established in the knee. Radha will continue physical therapy per provided protocol. She may use NSAIDs, Tylenol, and ice/heat as needed  for pain control. She will follow up in 6-8 weeks for recheck.         Internal derangement of right shoulder  Claustrophobia  Upon examination today, Radha has well-preserved shoulder strength, but experiences pain along the lateral shoulder during strength and ROM testing. Her shoulder pain has been present for 3 months since a fall onto the shoulder the week following her knee surgery and not improved. Given her traumatic shoulder pain that has persisted, I recommending ordering a right shoulder MRI to evaluate for internal derangement, concerning for rotator cuff and biceps tendon pathology. Radha will follow up after the MRI to discuss next treatment steps. In the meantime, she can continue using OTC medications and ice/heat as needed for pain control. She can continue activities as tolerated, using pain as a guide.    Orders:    MRI shoulder right wo contrast; Future    ALPRAZolam (XANAX) 0.5 mg tablet; Take 1 tablet (0.5 mg total) by mouth 1 (one) time for 1 dose Minutes prior to MRI       _____________________________________________________  CHIEF COMPLAINT:  Chief Complaint   Patient presents with    Right Knee - Post-op         SUBJECTIVE:  Patient is a 50 y.o. year old female who presents for follow up  now nearly 13 weeks s/p Right knee arthrosocpic medial meniscus root repair & chondroplasty - Right performed on 3/5/2025.  Today, the patient reports the knee is doing well overall. 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 However, she denies new injury/trauma, swelling, instability, or weakness. The patient continues to attend PT consistently per protocol and is pleased with her progress so far.     Of note, Radha also reports 3 months of right shoulder pain following a fall onto this side a week after her knee surgery. The patient had to use crutches while non-weight bearing and has been unable to treat her shoulder so far, which she believes is causing her shoulder to be persistently painful. She is using Naproxen and heat for pain control. She reports a history of right subacromial bursitis noted on MRI in 2013 that was successfully treated with a CSI at that time without further symptoms. She otherwise denies history of shoulder dysfunction. However, she is concerned that her shoulder is not improving following her injury and is hoping to address this now that her knee rehab is progressing.    _____________________________________________________  PHYSICAL EXAM:  General/Constitutional: NAD, well developed, well nourished  HENT: Normocephalic, atraumatic  CV: Intact distal pulses, regular rate  Resp: No respiratory distress or labored breathing  Abdomen: soft, nondistended   Lymphatic: No lymphadenopathy palpated  Neuro: Alert and Oriented x 3, no focal deficits  Psych: Normal mood, normal affect  Skin: Warm, dry, no rashes, no erythema    MUSCULOSKELETAL EXAMINATION:    Right Lower Extremity   Incision: well-healed  Range of Motion: 0-130+  No joint effusion  +EHL/FHL/TA/GS  SILT throughout  Palpable DP pulse     Right Upper Extremity  Shoulder Exam:     Inspection: No ecchymosis, edema, or deformity. No visualized  wounds or skin lesions   Palpation: bicipital groove TTP  Active Motion:       FF: 170+ with pain, symmetric        ER: 70+ with pain, symmetric        IR: T12 with pain compared to T10  Strength: 5/5 empty can with pain, 5/5 ER with pain,  5/5 IR   Sensory - SILT in the Radial / Ulnar / Median / Axillary nerve distributions  Motor - AIN / PIN / Radial / Ulnar / Median / Axillary motor nerves in tact  Palpable Radial pulse  Cap refill <2secs in all digits      Scribe Attestation      I,:  Estephania Iraheta PA-C am acting as a scribe while in the presence of the attending physician.:       I,:  Charli Pickens MD personally performed the services described in this documentation    as scribed in my presence.:

## 2025-06-02 NOTE — ASSESSMENT & PLAN NOTE
Radha is nearly 13 weeks s/p right medial meniscus root repair and chondroplasty. I believe the patient continues to progress appropriately and I am pleased with her clinical presentation today. We discussed that she may expect occasional achy sensations as she has some arthritic changes established in the knee. Radha will continue physical therapy per provided protocol. She may use NSAIDs, Tylenol, and ice/heat as needed  for pain control. She will follow up in 6-8 weeks for recheck.

## 2025-06-02 NOTE — PROGRESS NOTES
"Daily Note     Today's date: 2025  Patient name: Radha Gonzalez  : 1975  MRN: [id number]  Referring provider: Charli Pickens*  Dx:   Encounter Diagnosis     ICD-10-CM    1. Acute pain of right knee  M25.561                      Subjective: patient states her knee still gets a little achy but overall it is doing well.  is happy with progress      Objective: See treatment diary below      Assessment: Tolerated treatment well. Patient demonstrated fatigue post treatment, exhibited good technique with therapeutic exercises, and would benefit from continued PT Patient with improving tolerance to strengthening. Added some stability work today      Plan: Continue per plan of care.      Precautions: meniscus repair 3/5/25            Manuals    PROM done done done done done done done done done done                                          Neuro Re-Ed             Quad sets             bridges 3x10 10# 3x10 12# 3x10 12#  3x10 15# 3x10 15# 3x10 15# 3x10 15# 3x10 15#  3x10 15#  3x10 15#   SLS  10\"x5 10sx5 15s  x4 20sx3 20sx3 20sx3 20sx3 20sx3 20sx3 airex                                                       Ther Ex             Heel slides 2x10x5\" 2x10 2x10 2x10 2x10 2x10 2x10 2x10 2x10 2x10   saq             laq 3# 3x10 3# 3x10 5# 3x10 5# 3x10 5# 3x10 5# 3x10       bike 5 full 5' full 5'full 5' full 5' full 5' full 5' full 5' full 5' full 5' full lvl 3   slr 3x10 3# 3x10 3# 3x10 3# 3x10 3# 3x10 3# 3x10 3# 3x10 3# 3x10 3# 3x10 3# 3x10 3#   Calf s             SL hip abd             Knee ext machine     15# 3x10 15# 3x10 15# 3x10 15# 3x10 15# 3x10 15# 3x10   Leg press    55# 3x10 55# 3x10 65# 3x10 65# 3x10 65# 3x10 75# 3x10 75# 3x10   Ther Activity             sts Mini- squat 2x10 To table 3x10 To table 3x10 To table 3x10 3x10 3x10 3x10 3x10 10# 3x10 10# 3x10   Weight shifts             Heel raises 3x10 3x10 3x10 3x10 3x10 3x10 3x10 3x10 3x10 3x10   Step ups 4\" 2x10 " "6\" 3x10 6\" 3x10 6\" 3x10 6\"3x10 6\"3x10 6\" 3x10  4\" 2x10 step down 6\" 3x10  4\" 2x10 step down 6\" 3x10 6\" 3x10 step down   Gait Training             No crutch                          Modalities                                                                                      "

## 2025-06-04 DIAGNOSIS — N64.89 BREAST ASYMMETRY IN FEMALE: Primary | ICD-10-CM

## 2025-06-05 DIAGNOSIS — Z98.890 S/P MEDIAL MENISCUS REPAIR OF RIGHT KNEE: ICD-10-CM

## 2025-06-05 DIAGNOSIS — Z47.89 AFTERCARE FOLLOWING SURGERY OF THE MUSCULOSKELETAL SYSTEM: ICD-10-CM

## 2025-06-06 ENCOUNTER — APPOINTMENT (OUTPATIENT)
Dept: PHYSICAL THERAPY | Facility: REHABILITATION | Age: 50
End: 2025-06-06
Attending: ORTHOPAEDIC SURGERY
Payer: COMMERCIAL

## 2025-06-09 ENCOUNTER — OFFICE VISIT (OUTPATIENT)
Dept: PHYSICAL THERAPY | Facility: REHABILITATION | Age: 50
End: 2025-06-09
Attending: ORTHOPAEDIC SURGERY
Payer: COMMERCIAL

## 2025-06-09 DIAGNOSIS — E04.1 THYROID NODULE: ICD-10-CM

## 2025-06-09 DIAGNOSIS — E55.9 VITAMIN D DEFICIENCY: ICD-10-CM

## 2025-06-09 DIAGNOSIS — Z00.8 HEALTH EXAMINATION IN POPULATION SURVEYS: ICD-10-CM

## 2025-06-09 DIAGNOSIS — M25.561 ACUTE PAIN OF RIGHT KNEE: Primary | ICD-10-CM

## 2025-06-09 DIAGNOSIS — I10 BENIGN ESSENTIAL HYPERTENSION: Primary | ICD-10-CM

## 2025-06-09 PROCEDURE — 97530 THERAPEUTIC ACTIVITIES: CPT | Performed by: PHYSICAL THERAPIST

## 2025-06-09 PROCEDURE — 97110 THERAPEUTIC EXERCISES: CPT | Performed by: PHYSICAL THERAPIST

## 2025-06-09 PROCEDURE — 97112 NEUROMUSCULAR REEDUCATION: CPT | Performed by: PHYSICAL THERAPIST

## 2025-06-09 RX ORDER — NAPROXEN 500 MG/1
500 TABLET ORAL 2 TIMES DAILY WITH MEALS
Qty: 20 TABLET | Refills: 0 | Status: SHIPPED | OUTPATIENT
Start: 2025-06-09

## 2025-06-09 NOTE — PROGRESS NOTES
"Daily Note     Today's date: 2025  Patient name: Radha Gonzalez  : 1975  MRN: [id number]  Referring provider: Charli Pickens*  Dx:   Encounter Diagnosis     ICD-10-CM    1. Acute pain of right knee  M25.561                      Subjective: patient states she is still getting some pain especially by the end of the day      Objective: See treatment diary below      Assessment: Tolerated treatment well. Patient demonstrated fatigue post treatment, exhibited good technique with therapeutic exercises, and would benefit from continued PT Patient able to tolerate initiation of gentle plyometrics today without pain      Plan: Continue per plan of care.      Precautions: meniscus repair 3/5/25            Manuals    PROM done done done done done done done done done done done                                             Neuro Re-Ed              Quad sets              bridges 3x10 10# 3x10 12# 3x10 12#  3x10 15# 3x10 15# 3x10 15# 3x10 15# 3x10 15#  3x10 15#  3x10 15# 3x10 15#   SLS  10\"x5 10sx5 15s  x4 20sx3 20sx3 20sx3 20sx3 20sx3 20sx3 airex Airex 20sx3                                                           Ther Ex              Heel slides 2x10x5\" 2x10 2x10 2x10 2x10 2x10 2x10 2x10 2x10 2x10 2x10   saq              laq 3# 3x10 3# 3x10 5# 3x10 5# 3x10 5# 3x10 5# 3x10        bike 5 full 5' full 5'full 5' full 5' full 5' full 5' full 5' full 5' full 5' full lvl 3 5' full lvl 3   slr 3x10 3# 3x10 3# 3x10 3# 3x10 3# 3x10 3# 3x10 3# 3x10 3# 3x10 3# 3x10 3# 3x10 3# 3x10 3#   Calf s              SL hip abd              Knee ext machine     15# 3x10 15# 3x10 15# 3x10 15# 3x10 15# 3x10 15# 3x10 19# 3x10   Leg press    55# 3x10 55# 3x10 65# 3x10 65# 3x10 65# 3x10 75# 3x10 75# 3x10 79# 3x10   Ther Activity              sts Mini- squat 2x10 To table 3x10 To table 3x10 To table 3x10 3x10 3x10 3x10 3x10 10# 3x10 10# 3x10 10# 3x10   Weight shifts              Heel raises " "3x10 3x10 3x10 3x10 3x10 3x10 3x10 3x10 3x10 3x10 3x10   Step ups 4\" 2x10 6\" 3x10 6\" 3x10 6\" 3x10 6\"3x10 6\"3x10 6\" 3x10  4\" 2x10 step down 6\" 3x10  4\" 2x10 step down 6\" 3x10 6\" 3x10 step down 6\" 3x10 step down   hops           3x10   Gait Training              No crutch                            Modalities                                                                                           "

## 2025-06-11 RX ORDER — ERGOCALCIFEROL 1.25 MG/1
50000 CAPSULE, LIQUID FILLED ORAL WEEKLY
Qty: 12 CAPSULE | Refills: 0 | OUTPATIENT
Start: 2025-06-11

## 2025-06-12 ENCOUNTER — HOSPITAL ENCOUNTER (OUTPATIENT)
Dept: RADIOLOGY | Facility: HOSPITAL | Age: 50
Discharge: HOME | End: 2025-06-12
Payer: COMMERCIAL

## 2025-06-12 DIAGNOSIS — M24.811 INTERNAL DERANGEMENT OF RIGHT SHOULDER: ICD-10-CM

## 2025-06-12 PROCEDURE — 73221 MRI JOINT UPR EXTREM W/O DYE: CPT

## 2025-06-13 ENCOUNTER — OFFICE VISIT (OUTPATIENT)
Dept: PHYSICAL THERAPY | Facility: REHABILITATION | Age: 50
End: 2025-06-13
Attending: ORTHOPAEDIC SURGERY
Payer: COMMERCIAL

## 2025-06-13 ENCOUNTER — HOSPITAL ENCOUNTER (OUTPATIENT)
Dept: ULTRASOUND IMAGING | Facility: HOSPITAL | Age: 50
Discharge: HOME | End: 2025-06-13
Payer: COMMERCIAL

## 2025-06-13 DIAGNOSIS — M25.561 ACUTE PAIN OF RIGHT KNEE: Primary | ICD-10-CM

## 2025-06-13 DIAGNOSIS — E04.2 MULTIPLE THYROID NODULES: ICD-10-CM

## 2025-06-13 PROCEDURE — 97110 THERAPEUTIC EXERCISES: CPT | Performed by: PHYSICAL THERAPIST

## 2025-06-13 PROCEDURE — 76536 US EXAM OF HEAD AND NECK: CPT

## 2025-06-13 PROCEDURE — 97112 NEUROMUSCULAR REEDUCATION: CPT | Performed by: PHYSICAL THERAPIST

## 2025-06-13 NOTE — PROGRESS NOTES
"Daily Note     Today's date: 2025  Patient name: Radha Gonzalez  : 1975  MRN: [id number]  Referring provider: Charli Pickens*  Dx:   Encounter Diagnosis     ICD-10-CM    1. Acute pain of right knee  M25.561                      Subjective: patient states she is having more pain in the knee today      Objective: See treatment diary below      Assessment: Tolerated treatment well. Patient demonstrated fatigue post treatment, exhibited good technique with therapeutic exercises, and would benefit from continued PT Patient having more pain today so held some of the higher level strengthening today      Plan: Continue per plan of care.      Precautions: meniscus repair 3/5/25    PT 1 on 1: 11:00-11:            Manuals    PROM done done done done done done done                                 Neuro Re-Ed          Quad sets          bridges 3x10 15# 3x10 15# 3x10 15#  3x10 15#  3x10 15# 3x10 15# 3x10 15#   SLS 20sx3 20sx3 20sx3 20sx3 20sx3 airex Airex 20sx3 Airex 20sx3                                           Ther Ex          Heel slides 2x10 2x10 2x10 2x10 2x10 2x10 2x10   saq          laq 5# 3x10         bike 5' full 5' full 5' full 5' full 5' full lvl 3 5' full lvl 3 5' lvl    slr 3x10 3# 3x10 3# 3x10 3# 3x10 3# 3x10 3# 3x10 3# 3x10 3#   Calf s          SL hip abd          Knee ext machine 15# 3x10 15# 3x10 15# 3x10 15# 3x10 15# 3x10 19# 3x10    Leg press 65# 3x10 65# 3x10 65# 3x10 75# 3x10 75# 3x10 79# 3x10    Ther Activity          sts 3x10 3x10 3x10 10# 3x10 10# 3x10 10# 3x10    Weight shifts          Heel raises 3x10 3x10 3x10 3x10 3x10 3x10 3x10   Step ups 6\"3x10 6\" 3x10  4\" 2x10 step down 6\" 3x10  4\" 2x10 step down 6\" 3x10 6\" 3x10 step down 6\" 3x10 step down 6\" step up   hops      3x10    Gait Training          No crutch                    Modalities                                                                                 "

## 2025-06-16 ENCOUNTER — OFFICE VISIT (OUTPATIENT)
Dept: PHYSICAL THERAPY | Facility: REHABILITATION | Age: 50
End: 2025-06-16
Attending: ORTHOPAEDIC SURGERY
Payer: COMMERCIAL

## 2025-06-16 DIAGNOSIS — M25.561 ACUTE PAIN OF RIGHT KNEE: Primary | ICD-10-CM

## 2025-06-16 PROCEDURE — 97110 THERAPEUTIC EXERCISES: CPT | Performed by: PHYSICAL THERAPIST

## 2025-06-16 PROCEDURE — 97112 NEUROMUSCULAR REEDUCATION: CPT | Performed by: PHYSICAL THERAPIST

## 2025-06-16 PROCEDURE — 97530 THERAPEUTIC ACTIVITIES: CPT | Performed by: PHYSICAL THERAPIST

## 2025-06-16 NOTE — PROGRESS NOTES
"Daily Note     Today's date: 2025  Patient name: Radha Gonzalez  : 1975  MRN: [id number]  Referring provider: Charli Pickens*  Dx:   Encounter Diagnosis     ICD-10-CM    1. Acute pain of right knee  M25.561                      Subjective: patient states her knee is doing a lot better today      Objective: See treatment diary below      Assessment: Tolerated treatment well. Patient demonstrated fatigue post treatment, exhibited good technique with therapeutic exercises, and would benefit from continued PT Patient with less pain today. Patient able to tolerate some hopping today      Plan: Continue per plan of care.      Precautions: meniscus repair 3/5/25                Manuals    PROM done done done done done done done done                                    Neuro Re-Ed           Quad sets           bridges 3x10 15# 3x10 15# 3x10 15#  3x10 15#  3x10 15# 3x10 15# 3x10 15# 3x10 15#   SLS 20sx3 20sx3 20sx3 20sx3 20sx3 airex Airex 20sx3 Airex 20sx3 Airex 3x20s                                               Ther Ex           Heel slides 2x10 2x10 2x10 2x10 2x10 2x10 2x10 2x10   saq           laq 5# 3x10          bike 5' full 5' full 5' full 5' full 5' full lvl 3 5' full lvl 3 5' lvl  5' lvl 1   slr 3x10 3# 3x10 3# 3x10 3# 3x10 3# 3x10 3# 3x10 3# 3x10 3# 3x10 3#   Calf s           SL hip abd           Knee ext machine 15# 3x10 15# 3x10 15# 3x10 15# 3x10 15# 3x10 19# 3x10  19# 3x10   Leg press 65# 3x10 65# 3x10 65# 3x10 75# 3x10 75# 3x10 79# 3x10  79# 3x10   Ther Activity           sts 3x10 3x10 3x10 10# 3x10 10# 3x10 10# 3x10  15# 3x10   Weight shifts           Heel raises 3x10 3x10 3x10 3x10 3x10 3x10 3x10 3x10   Step ups 6\"3x10 6\" 3x10  4\" 2x10 step down 6\" 3x10  4\" 2x10 step down 6\" 3x10 6\" 3x10 step down 6\" 3x10 step down 6\" step up 6\" 2x10 step down   hops      3x10  3x10 A/P M/L   Gait Training           No crutch                      Modalities                 "

## 2025-06-19 ENCOUNTER — OFFICE VISIT (OUTPATIENT)
Dept: OBGYN CLINIC | Facility: CLINIC | Age: 50
End: 2025-06-19
Payer: COMMERCIAL

## 2025-06-19 VITALS — WEIGHT: 177 LBS | BODY MASS INDEX: 33.42 KG/M2 | HEIGHT: 61 IN

## 2025-06-19 DIAGNOSIS — S46.011D TRAUMATIC COMPLETE TEAR OF RIGHT ROTATOR CUFF, SUBSEQUENT ENCOUNTER: Primary | ICD-10-CM

## 2025-06-19 PROCEDURE — 99213 OFFICE O/P EST LOW 20 MIN: CPT | Performed by: ORTHOPAEDIC SURGERY

## 2025-06-19 PROCEDURE — 20610 DRAIN/INJ JOINT/BURSA W/O US: CPT | Performed by: ORTHOPAEDIC SURGERY

## 2025-06-19 RX ADMIN — ROPIVACAINE HYDROCHLORIDE 4 ML: 5 INJECTION, SOLUTION EPIDURAL; INFILTRATION; PERINEURAL at 12:15

## 2025-06-19 RX ADMIN — KETOROLAC TROMETHAMINE 30 MG: 30 INJECTION, SOLUTION INTRAMUSCULAR; INTRAVENOUS at 12:15

## 2025-06-20 ENCOUNTER — OFFICE VISIT (OUTPATIENT)
Dept: PHYSICAL THERAPY | Facility: REHABILITATION | Age: 50
End: 2025-06-20
Attending: ORTHOPAEDIC SURGERY
Payer: COMMERCIAL

## 2025-06-20 DIAGNOSIS — M25.561 ACUTE PAIN OF RIGHT KNEE: Primary | ICD-10-CM

## 2025-06-20 PROCEDURE — 97530 THERAPEUTIC ACTIVITIES: CPT | Performed by: PHYSICAL THERAPIST

## 2025-06-20 PROCEDURE — 97112 NEUROMUSCULAR REEDUCATION: CPT | Performed by: PHYSICAL THERAPIST

## 2025-06-20 PROCEDURE — 97110 THERAPEUTIC EXERCISES: CPT | Performed by: PHYSICAL THERAPIST

## 2025-06-20 RX ORDER — ROPIVACAINE HYDROCHLORIDE 5 MG/ML
4 INJECTION, SOLUTION EPIDURAL; INFILTRATION; PERINEURAL
Status: COMPLETED | OUTPATIENT
Start: 2025-06-19 | End: 2025-06-19

## 2025-06-20 RX ORDER — KETOROLAC TROMETHAMINE 30 MG/ML
30 INJECTION, SOLUTION INTRAMUSCULAR; INTRAVENOUS
Status: COMPLETED | OUTPATIENT
Start: 2025-06-19 | End: 2025-06-19

## 2025-06-20 NOTE — PROGRESS NOTES
Patient Name: Radha Gonzalez      : 1975       MRN: [id number]   Encounter Provider: Charli Pickens MD   Encounter Date: 25  Encounter department: [facility]         Assessment & Plan  Traumatic complete tear of right rotator cuff, subsequent encounter  Reviewed MRI today which did show a full-thickness supraspinatus tear.  This was an acute traumatic injury that occurred in the early postoperative period after her meniscus repair when she was on crutches.  She does have overall well-maintained strength but a large degree of pain with supraspinatus testing and significant sleep disturbances.  I did explain that in an ideal world early repair of her traumatic full-thickness supraspinatus tear has been shown to lead to better outcomes than delayed repair or nonoperative treatment.  However she is still only 3 months out from a meniscal root repair and Missing a significant amount of time from work and her  duties with another surgery is not realistic for her at this point.  Is going to plan on delaying surgery for now likely until the fall.  We did review that full-thickness tears can become larger over time and atrophy can occur that can make repair less successful or not possible.  I explained that this is possible but unlikely to occur in the next 3 months, or even 6 months.  However if she were to get to the point of 6 months from her last MRI I would recommend a new MRI prior to surgery to assess the quality of the tissue and the repair ability of the tear.    For now she is interested in pain relief and physical therapy.  We discussed injection options for pain relief.  I explained the corticosteroid injections have been shown to decrease success rates of surgery for at least several months after they are completed.  We discussed NSAID injection versus PRP injections which both likely have less possible detrimental effects.  I explained that  "neither of these injections would be expected to heal the tear but if she can experience significant pain relief and get stronger with physical therapy then she may be able to continue activities as tolerated with no specific restrictions.  NSAID injection in the subacromial space performed and well-tolerated today.  She is going to start physical therapy for the shoulder.  I will see her back in July at her previously scheduled knee follow-up appointment and we will continue to discuss both her knee and her shoulder.        Large joint arthrocentesis: R subacromial bursa    Performed by: Charli Pickens MD  Authorized by: Charli Pickens MD    Universal Protocol:  procedure performed by consultantConsent: Verbal consent obtained  Risks and benefits: risks, benefits and alternatives were discussed  Consent given by: patient  Time out: Immediately prior to procedure a \"time out\" was called to verify the correct patient, procedure, equipment, support staff and site/side marked as required.  Patient understanding: patient states understanding of the procedure being performed  Patient identity confirmed: verbally with patient  Supporting Documentation  Indications: pain     Is this a Visco injection? NoProcedure Details  Location: shoulder - R subacromial bursa  Needle size: 22 G  Ultrasound guidance: no  Approach: posterolateral  Medications administered: 4 mL ropivacaine 0.5 %; 30 mg ketorolac 30 mg/mL    Patient tolerance: patient tolerated the procedure well with no immediate complications  Dressing:  Sterile dressing applied          _____________________________________________________  CHIEF COMPLAINT:  Chief Complaint   Patient presents with    Right Shoulder - Follow-up         SUBJECTIVE:  Patient is a 50 y.o. year old female who presents for MRI review of her right shoulder.  This does show a full-thickness supraspinatus tear.  She continues to have pain during the day but she has been " overall functional.  She has been able to complete her normal work duties with out significant difficulty.  However she frequently wakes at night and is not getting good sleep due to the pain in her shoulder.  She does still have pain with overhead and reaching activities.  Denies numbness or tingling.    _____________________________________________________  PHYSICAL EXAM:  General/Constitutional: NAD, well developed, well nourished  HENT: Normocephalic, atraumatic  CV: Intact distal pulses, regular rate  Resp: No respiratory distress or labored breathing  Abdomen: soft, nondistended   Lymphatic: No lymphadenopathy palpated  Neuro: Alert and Oriented x 3, no focal deficits  Psych: Normal mood, normal affect  Skin: Warm, dry, no rashes, no erythema    MUSCULOSKELETAL EXAMINATION:        Right Upper Extremity  Shoulder Exam:     Inspection: No ecchymosis, edema, or deformity. No visualized wounds or skin lesions   Palpation: bicipital groove TTP  Active Motion:       FF: 170+ with pain, symmetric        ER: 70+ with pain, symmetric        IR: T12 with pain compared to T10  Strength: 4+/5 empty can with pain, 5/5 ER with pain,  5/5 IR   Sensory - SILT in the Radial / Ulnar / Median / Axillary nerve distributions  Motor - AIN / PIN / Radial / Ulnar / Median / Axillary motor nerves in tact  Palpable Radial pulse  Cap refill <2secs in all digits      MRI of the shoulder reviewed interpreted showing a full-thickness supraspinatus tear.  Other tendons remain intact.  Slight signal abnormality at the superior border of the subscapularis that may also represent a small tear.  No significant degenerative changes.

## 2025-06-20 NOTE — PROGRESS NOTES
"Daily Note     Today's date: 2025  Patient name: Radha Gonzlaez  : 1975  MRN: [id number]  Referring provider: Charli Pickens*  Dx:   Encounter Diagnosis     ICD-10-CM    1. Acute pain of right knee  M25.561                      Subjective: patient states her knee is feeling really good the last couple of days      Objective: See treatment diary below      Assessment: Tolerated treatment well. Patient demonstrated fatigue post treatment, exhibited good technique with therapeutic exercises, and would benefit from continued PT Patient is doing well. She was able to tolerate some light agility ladder work today. Initiated single leg strengthening which was challenging but she was able to perform      Plan: Continue per plan of care.      Precautions: meniscus repair 3/5/25                Manuals    PROM done done done done done done done done done                                       Neuro Re-Ed            Quad sets            bridges 3x10 15# 3x10 15# 3x10 15#  3x10 15#  3x10 15# 3x10 15# 3x10 15# 3x10 15# 3x10 15#   SLS 20sx3 20sx3 20sx3 20sx3 20sx3 airex Airex 20sx3 Airex 20sx3 Airex 3x20s Airex 3x20s                                                   Ther Ex            Heel slides 2x10 2x10 2x10 2x10 2x10 2x10 2x10 2x10 2x10   saq            laq 5# 3x10           bike 5' full 5' full 5' full 5' full 5' full lvl 3 5' full lvl 3 5' lvl  5' lvl 1 5' lvl 1   slr 3x10 3# 3x10 3# 3x10 3# 3x10 3# 3x10 3# 3x10 3# 3x10 3# 3x10 3# 3x10 3#   Calf s            SL hip abd            Knee ext machine 15# 3x10 15# 3x10 15# 3x10 15# 3x10 15# 3x10 19# 3x10  19# 3x10 SL 15# 3x8   Leg press 65# 3x10 65# 3x10 65# 3x10 75# 3x10 75# 3x10 79# 3x10  79# 3x10 SL 45# 3x10   Ther Activity            sts 3x10 3x10 3x10 10# 3x10 10# 3x10 10# 3x10  15# 3x10 15# 3x10   Weight shifts            Heel raises 3x10 3x10 3x10 3x10 3x10 3x10 3x10 3x10 3x10   Step ups 6\"3x10 6\" 3x10  4\" 2x10 " "step down 6\" 3x10  4\" 2x10 step down 6\" 3x10 6\" 3x10 step down 6\" 3x10 step down 6\" step up 6\" 2x10 step down 6\" 2x10 step down   hops      3x10  3x10 A/P M/L Agility ladder done   Gait Training            No crutch                        Modalities                                                                                           "

## 2025-06-23 ENCOUNTER — OFFICE VISIT (OUTPATIENT)
Dept: PHYSICAL THERAPY | Facility: REHABILITATION | Age: 50
End: 2025-06-23
Attending: ORTHOPAEDIC SURGERY
Payer: COMMERCIAL

## 2025-06-23 DIAGNOSIS — M25.561 ACUTE PAIN OF RIGHT KNEE: Primary | ICD-10-CM

## 2025-06-23 PROCEDURE — 97530 THERAPEUTIC ACTIVITIES: CPT | Performed by: PHYSICAL THERAPIST

## 2025-06-23 PROCEDURE — 97112 NEUROMUSCULAR REEDUCATION: CPT | Performed by: PHYSICAL THERAPIST

## 2025-06-23 PROCEDURE — 97110 THERAPEUTIC EXERCISES: CPT | Performed by: PHYSICAL THERAPIST

## 2025-06-23 NOTE — PROGRESS NOTES
Daily Note     Today's date: 2025  Patient name: Radha Gonzalez  : 1975  MRN: [id number]  Referring provider: Charli Pickens*  Dx:   Encounter Diagnosis     ICD-10-CM    1. Acute pain of right knee  M25.561                      Subjective: patient states she was getting some zapping in her knee this weekend but hasn't felt it since      Objective: See treatment diary below      Assessment: Tolerated treatment well. Patient demonstrated fatigue post treatment, exhibited good technique with therapeutic exercises, and would benefit from continued PT Patient continues to make improvements in tolerance to single leg strength and plyometric exercises      Plan: Continue per plan of care.      Precautions: meniscus repair 3/5/25                Manuals    PROM done done done done done done done done done done                                          Neuro Re-Ed             Quad sets             bridges 3x10 15# 3x10 15# 3x10 15#  3x10 15#  3x10 15# 3x10 15# 3x10 15# 3x10 15# 3x10 15# 3x10 15#   SLS 20sx3 20sx3 20sx3 20sx3 20sx3 airex Airex 20sx3 Airex 20sx3 Airex 3x20s Airex 3x20s Airex 3x20s                                                       Ther Ex             Heel slides 2x10 2x10 2x10 2x10 2x10 2x10 2x10 2x10 2x10 2x10   saq             laq 5# 3x10            bike 5' full 5' full 5' full 5' full 5' full lvl 3 5' full lvl 3 5' lvl  5' lvl 1 5' lvl 1 5' lvl 1   slr 3x10 3# 3x10 3# 3x10 3# 3x10 3# 3x10 3# 3x10 3# 3x10 3# 3x10 3# 3x10 3# 3x10 3#   Calf s             SL hip abd             Knee ext machine 15# 3x10 15# 3x10 15# 3x10 15# 3x10 15# 3x10 19# 3x10  19# 3x10 SL 15# 3x8 SL 15# 3x10   Leg press 65# 3x10 65# 3x10 65# 3x10 75# 3x10 75# 3x10 79# 3x10  79# 3x10 SL 45# 3x10 SL 45# 3x10   Ther Activity             sts 3x10 3x10 3x10 10# 3x10 10# 3x10 10# 3x10  15# 3x10 15# 3x10 15# 3x10   Weight shifts             Heel raises 3x10 3x10 3x10 3x10 3x10  "3x10 3x10 3x10 3x10 3x10   Step ups 6\"3x10 6\" 3x10  4\" 2x10 step down 6\" 3x10  4\" 2x10 step down 6\" 3x10 6\" 3x10 step down 6\" 3x10 step down 6\" step up 6\" 2x10 step down 6\" 2x10 step down 6\" 3x10 step down   hops      3x10  3x10 A/P M/L Agility ladder done Agility ladder done   Gait Training             No crutch                          Modalities                                                                                                "

## 2025-06-27 ENCOUNTER — OFFICE VISIT (OUTPATIENT)
Dept: PHYSICAL THERAPY | Facility: REHABILITATION | Age: 50
End: 2025-06-27
Attending: ORTHOPAEDIC SURGERY
Payer: COMMERCIAL

## 2025-06-27 DIAGNOSIS — M25.561 ACUTE PAIN OF RIGHT KNEE: Primary | ICD-10-CM

## 2025-06-27 PROCEDURE — 97530 THERAPEUTIC ACTIVITIES: CPT | Performed by: PHYSICAL THERAPIST

## 2025-06-27 PROCEDURE — 97112 NEUROMUSCULAR REEDUCATION: CPT | Performed by: PHYSICAL THERAPIST

## 2025-06-27 PROCEDURE — 97110 THERAPEUTIC EXERCISES: CPT | Performed by: PHYSICAL THERAPIST

## 2025-06-27 NOTE — PROGRESS NOTES
Daily Note     Today's date: 2025  Patient name: Radha Gonzalez  : 1975  MRN: [id number]  Referring provider: Charli Pickens*  Dx:   Encounter Diagnosis     ICD-10-CM    1. Acute pain of right knee  M25.561                      Subjective: patient states her knee is about 90%. She is still having some weakness      Objective: See treatment diary below      Assessment: Tolerated treatment well. Patient demonstrated fatigue post treatment, exhibited good technique with therapeutic exercises, and would benefit from continued PT Patient is doing very well. Single leg quad strength still limited but improving. Patient not ready for single leg hops yet      Plan: Continue per plan of care.      Precautions: meniscus repair 3/5/25                Manuals  6   PROM done done done done done done done done done done done                                             Neuro Re-Ed              Quad sets              bridges 3x10 15# 3x10 15# 3x10 15#  3x10 15#  3x10 15# 3x10 15# 3x10 15# 3x10 15# 3x10 15# 3x10 15#    SLS 20sx3 20sx3 20sx3 20sx3 20sx3 airex Airex 20sx3 Airex 20sx3 Airex 3x20s Airex 3x20s Airex 3x20s                                                            Ther Ex              Heel slides 2x10 2x10 2x10 2x10 2x10 2x10 2x10 2x10 2x10 2x10    saq              laq 5# 3x10             bike 5' full 5' full 5' full 5' full 5' full lvl 3 5' full lvl 3 5' lvl  5' lvl 1 5' lvl 1 5' lvl 1 5' lvl 3   slr 3x10 3# 3x10 3# 3x10 3# 3x10 3# 3x10 3# 3x10 3# 3x10 3# 3x10 3# 3x10 3# 3x10 3#    Calf s              SL hip abd              Knee ext machine 15# 3x10 15# 3x10 15# 3x10 15# 3x10 15# 3x10 19# 3x10  19# 3x10 SL 15# 3x8 SL 15# 3x10 SL 15# 3x10   Leg press 65# 3x10 65# 3x10 65# 3x10 75# 3x10 75# 3x10 79# 3x10  79# 3x10 SL 45# 3x10 SL 45# 3x10 SL 55# 3x10   Ther Activity              sts 3x10 3x10 3x10 10# 3x10 10# 3x10 10# 3x10  15# 3x10 15# 3x10 15# 3x10 15#  "3x10   Weight shifts              Heel raises 3x10 3x10 3x10 3x10 3x10 3x10 3x10 3x10 3x10 3x10    Step ups 6\"3x10 6\" 3x10  4\" 2x10 step down 6\" 3x10  4\" 2x10 step down 6\" 3x10 6\" 3x10 step down 6\" 3x10 step down 6\" step up 6\" 2x10 step down 6\" 2x10 step down 6\" 3x10 step down 6\" 2x10 step down   hops      3x10  3x10 A/P M/L Agility ladder done Agility ladder done    Gait Training              No crutch                            Modalities                                                                                                     "

## 2025-06-30 ENCOUNTER — OFFICE VISIT (OUTPATIENT)
Dept: PHYSICAL THERAPY | Facility: REHABILITATION | Age: 50
End: 2025-06-30
Attending: ORTHOPAEDIC SURGERY
Payer: COMMERCIAL

## 2025-06-30 DIAGNOSIS — M25.561 ACUTE PAIN OF RIGHT KNEE: Primary | ICD-10-CM

## 2025-06-30 PROCEDURE — 97110 THERAPEUTIC EXERCISES: CPT | Performed by: PHYSICAL THERAPIST

## 2025-06-30 PROCEDURE — 97530 THERAPEUTIC ACTIVITIES: CPT | Performed by: PHYSICAL THERAPIST

## 2025-06-30 PROCEDURE — 97112 NEUROMUSCULAR REEDUCATION: CPT | Performed by: PHYSICAL THERAPIST

## 2025-06-30 NOTE — PROGRESS NOTES
Daily Note     Today's date: 2025  Patient name: Radha Gonzalez  : 1975  MRN: [id number]  Referring provider: Charli Pickens*  Dx:   Encounter Diagnosis     ICD-10-CM    1. Acute pain of right knee  M25.561                      Subjective: patient states her knee keeps getting stronger      Objective: See treatment diary below      Assessment: Tolerated treatment well. Patient demonstrated fatigue post treatment, exhibited good technique with therapeutic exercises, and would benefit from continued PT Patient with improving SL quad strength. Less shaking noted with SL knee exts      Plan: Continue per plan of care.      Precautions: meniscus repair 3/5/25                Manuals  6/   PROM done done done done done done done done done done done done                                                Neuro Re-Ed               Quad sets               bridges 3x10 15# 3x10 15# 3x10 15#  3x10 15#  3x10 15# 3x10 15# 3x10 15# 3x10 15# 3x10 15# 3x10 15#  3x10 15#   SLS 20sx3 20sx3 20sx3 20sx3 20sx3 airex Airex 20sx3 Airex 20sx3 Airex 3x20s Airex 3x20s Airex 3x20s                                                                 Ther Ex               Heel slides 2x10 2x10 2x10 2x10 2x10 2x10 2x10 2x10 2x10 2x10     saq               laq 5# 3x10              bike 5' full 5' full 5' full 5' full 5' full lvl 3 5' full lvl 3 5' lvl  5' lvl 1 5' lvl 1 5' lvl 1 5' lvl 3 5' lvl 3   slr 3x10 3# 3x10 3# 3x10 3# 3x10 3# 3x10 3# 3x10 3# 3x10 3# 3x10 3# 3x10 3# 3x10 3#  3x10 3#   Calf s               SL hip abd               Knee ext machine 15# 3x10 15# 3x10 15# 3x10 15# 3x10 15# 3x10 19# 3x10  19# 3x10 SL 15# 3x8 SL 15# 3x10 SL 15# 3x10 SL 15# 3x10   Leg press 65# 3x10 65# 3x10 65# 3x10 75# 3x10 75# 3x10 79# 3x10  79# 3x10 SL 45# 3x10 SL 45# 3x10 SL 55# 3x10 SL 55# 3x10   Ther Activity               sts 3x10 3x10 3x10 10# 3x10 10# 3x10 10# 3x10  15# 3x10 15# 3x10 15#  "3x10 15# 3x10 20# 3x10   Weight shifts               Heel raises 3x10 3x10 3x10 3x10 3x10 3x10 3x10 3x10 3x10 3x10  SL 3x10   Step ups 6\"3x10 6\" 3x10  4\" 2x10 step down 6\" 3x10  4\" 2x10 step down 6\" 3x10 6\" 3x10 step down 6\" 3x10 step down 6\" step up 6\" 2x10 step down 6\" 2x10 step down 6\" 3x10 step down 6\" 2x10 step down    hops      3x10  3x10 A/P M/L Agility ladder done Agility ladder done  Agility ladder done   Gait Training               No crutch                              Modalities                                                                                                          "

## 2025-07-01 ENCOUNTER — TELEPHONE (OUTPATIENT)
Dept: BARIATRICS | Facility: CLINIC | Age: 50
End: 2025-07-01

## 2025-07-02 ENCOUNTER — OFFICE VISIT (OUTPATIENT)
Dept: PHYSICAL THERAPY | Facility: REHABILITATION | Age: 50
End: 2025-07-02
Attending: ORTHOPAEDIC SURGERY
Payer: COMMERCIAL

## 2025-07-02 ENCOUNTER — OFFICE VISIT (OUTPATIENT)
Dept: BARIATRICS | Facility: CLINIC | Age: 50
End: 2025-07-02
Payer: COMMERCIAL

## 2025-07-02 VITALS
WEIGHT: 177.4 LBS | BODY MASS INDEX: 34.83 KG/M2 | HEIGHT: 60 IN | HEART RATE: 75 BPM | DIASTOLIC BLOOD PRESSURE: 80 MMHG | SYSTOLIC BLOOD PRESSURE: 118 MMHG

## 2025-07-02 DIAGNOSIS — M25.561 ACUTE PAIN OF RIGHT KNEE: Primary | ICD-10-CM

## 2025-07-02 DIAGNOSIS — E66.811 CLASS 1 OBESITY DUE TO EXCESS CALORIES WITH SERIOUS COMORBIDITY AND BODY MASS INDEX (BMI) OF 34.0 TO 34.9 IN ADULT: Primary | ICD-10-CM

## 2025-07-02 DIAGNOSIS — E66.811 CLASS 1 OBESITY DUE TO EXCESS CALORIES WITH SERIOUS COMORBIDITY AND BODY MASS INDEX (BMI) OF 30.0 TO 30.9 IN ADULT: ICD-10-CM

## 2025-07-02 DIAGNOSIS — E66.09 CLASS 1 OBESITY DUE TO EXCESS CALORIES WITH SERIOUS COMORBIDITY AND BODY MASS INDEX (BMI) OF 34.0 TO 34.9 IN ADULT: Primary | ICD-10-CM

## 2025-07-02 DIAGNOSIS — E66.09 CLASS 1 OBESITY DUE TO EXCESS CALORIES WITH SERIOUS COMORBIDITY AND BODY MASS INDEX (BMI) OF 30.0 TO 30.9 IN ADULT: ICD-10-CM

## 2025-07-02 PROCEDURE — 97110 THERAPEUTIC EXERCISES: CPT | Performed by: PHYSICAL THERAPIST

## 2025-07-02 PROCEDURE — 99204 OFFICE O/P NEW MOD 45 MIN: CPT | Performed by: NURSE PRACTITIONER

## 2025-07-02 PROCEDURE — 97112 NEUROMUSCULAR REEDUCATION: CPT | Performed by: PHYSICAL THERAPIST

## 2025-07-02 PROCEDURE — 97530 THERAPEUTIC ACTIVITIES: CPT | Performed by: PHYSICAL THERAPIST

## 2025-07-02 RX ORDER — TIRZEPATIDE 2.5 MG/.5ML
2.5 INJECTION, SOLUTION SUBCUTANEOUS WEEKLY
Qty: 2 ML | Refills: 0 | Status: SHIPPED | OUTPATIENT
Start: 2025-07-02 | End: 2025-07-30

## 2025-07-02 NOTE — PROGRESS NOTES
Assessment/Plan:    Class 1 obesity due to excess calories with serious comorbidity and body mass index (BMI) of 30.0 to 30.9 in adult  - Discussed options of HealthyCORE-Intensive Lifestyle Intervention Program, Very Low Calorie Diet-VLCD, and Conservative Program and the role of weight loss medications.  - Patient is interested in pursuing Conservative Program and follow up visits with medical weight management provider.  - Explained the importance of making lifestyle changes in addition to starting any anti-obesity medications.   - Initial weight loss goal of 5-10% weight loss for improved health. Weight loss can improve patient's co-morbid conditions and/or prevent weight-related complications.  - Weight is not at goal and patient has been unable to achieve a meaningful weight loss above 5% using various programs and tools for more than 6 months  - Labs reviewed from 11/2024 and 1/2025    General Recommendations:  Nutrition:  Eat breakfast daily.  Do not skip meals.      Food log (ie.) www.Lizhi.com, sparkpeople.com, loseit.com, calorieking.com, etc.     Practice mindful eating.  Be sure to set aside time to eat, eat slowly, and savor your food.     Hydration:    At least 64oz of water daily.  No sugar sweetened beverages.  No juice (eat the fruit instead).     Exercise:  Studies have shown that the ideal exercise goal is somewhere between 150 to 300 minutes of moderate intensity exercise a week.  Start with exercising 10 minutes every other day and gradually increase physical activity with a goal of at least 150 minutes of moderate intensity exercise a week, divided over at least 3 days a week.  An example of this would be exercising 30 minutes a day, 5 days a week.  Resistance training can increase muscle mass and increase our resting metabolic rate.   FULL BODY resistance training is recommended 2-3 times a week.  Do not do this on consecutive days to allow for muscle recovery.     Aim for a bare  minimum 5000 steps, even on days you do not exercise.     Monitoring:   Weigh yourself daily.  If this causes undue stress, then just weigh yourself once a week.  Weigh yourself the same time of the day with the same amount of clothing on.  Preferably this should be done after waking up, before you eat, and with no clothing or minimal clothing on.     Specific Goals:  Patient lifestyle habits were reviewed and barriers to weight loss were addressed today.  Nutrition was discussed and patient will be meeting with a dietitian to discuss meal planning and complete the body stats package.  She will make sure she is staying within her recommended ranges for both protein and calories.  Medications were discussed:  Phentermine to be avoided due to history of palpitations and hypertension.  Patient also did not have weight loss success on this medication in the past  GLP-1 medications were discussed and patient was interested in Zepbound to help her possible metabolic disease as well as her weight.    Zepbound Instructions:    - Begin Zepbound 2.5 mg subcutaneously once a week. Dose changes may occur after 4 doses if medication is tolerated. You will be assessed prior to each dose change to make sure you are tolerating the medication well.  - Please message me when you have 2 pens left from the prescription so there are no lapses in treatment.  - If you have been off the medication for more than 14 days please contact the office as you will need to restart the titration at the starting dose again to avoid significant side effects or adverse events.  - Visit Zepbound.com for further information/injection instructions.   -Please eat small frequent meals to help reduce nausea. Lemon water and saltine crackers may help with this.   - Side effects of Zepbound discussed: nausea, vomiting, diarrhea, and constipation. If you experience fever, nausea/vomiting, and pain radiating to your back this may be a sign of pancreatitis.  Please go to the emergency room if this occurs.  - If on oral birth control a 2nd method of birth control is recommended during the 1st 8 weeks of therapy and for 4 weeks after any dosage change.   - Patient understands the side effects of the medication and proper administration. Patient agrees with the treatment plan and all questions were answered.  -Supply concerns were discussed with patient and patient voiced understanding that they will need to call with adequate time for refills to avoid any lapses in treatment.  Patient may also have to call around to different pharmacies to see if any pharmacy has the appropriate dose in stock.         Radha was seen today for consult.    Diagnoses and all orders for this visit:    Class 1 obesity due to excess calories with serious comorbidity and body mass index (BMI) of 34.0 to 34.9 in adult  -     tirzepatide (Zepbound) 2.5 mg/0.5 mL auto-injector; Inject 0.5 mL (2.5 mg total) under the skin once a week for 28 days    Class 1 obesity due to excess calories with serious comorbidity and body mass index (BMI) of 30.0 to 30.9 in adult         Medication consent was reviewed today and all questions were answered.  Patient agreed with all bulleted points.  Consent was signed, scanned into chart and patient was provided with a copy for their records.    Patient denies personal history of pancreatitis. Patient also denies personal and family history of medullary thyroid cancer and multiple endocrine neoplasia type 2 (MEN 2 tumor). Patient denies any history of kidney stones, seizures, or glaucoma.     Total time spent reviewing chart, interviewing patient, examining patient, discussing plan, answering all questions, and documentin min, with >50% face-to-face time spent counseling patient on nonsurgical interventions for the treatment of excess weight. Discussed in detail nonsurgical options including intensive lifestyle intervention program, very low-calorie diet program  "and conservative program.  Discussed the role of weight loss medications.  Counseled patient on diet behavior and exercise modification for weight loss.    Follow up in approximately 3 months with Non-Surgical Physician/Advanced Practitioner.    Subjective:   Chief Complaint   Patient presents with    Consult     Pt is here for MWM consult. Waist - 33\"       Patient ID: Radha Gonzalez  is a 50 y.o. female with excess weight/obesity here to pursue weight management.  Previous notes and records have been reviewed.    Past Medical History[1]  Past Surgical History[2]    HPI:  Wt Readings from Last 20 Encounters:   07/02/25 80.5 kg (177 lb 6.4 oz)   06/19/25 80.3 kg (177 lb)   06/02/25 80.3 kg (177 lb)   03/05/25 78 kg (171 lb 15.3 oz)   01/31/25 74.4 kg (164 lb)   01/24/25 76.2 kg (168 lb)   01/19/25 76.6 kg (168 lb 14 oz)   01/13/25 74.4 kg (164 lb)   12/06/24 74.4 kg (164 lb)   11/29/24 74.4 kg (164 lb)   09/13/24 72.6 kg (160 lb)   08/29/24 73.5 kg (162 lb)   08/28/24 73.5 kg (162 lb 0.6 oz)   08/23/24 71.7 kg (158 lb)   03/08/24 69.9 kg (154 lb)   12/06/23 65.8 kg (145 lb 1 oz)   08/18/23 65.8 kg (145 lb)   07/28/23 65.8 kg (145 lb)   06/13/23 65.9 kg (145 lb 3.2 oz)   05/26/23 67.6 kg (149 lb)       Patient presents today to medical weight management office for consult.  Patient had previously been with medical weight management in 2019 and was able to reduce her weight from 180 pounds 245 pounds using just lifestyle habits alone.  She is continue to maintain many of these habits but over time has seen her weight continually go up.  She voices frustration that she is staying on track but still keeps gaining and believes it is related to hormonal changes.  She has gained over 20 pounds in the past couple of years and would like to discuss medications at this time to help her reduce her weight.  Patient is currently not able to exercise due to knee and shoulder injury.  She would likely be having shoulder surgery in " October and is recovering from meniscus surgery in her knee.  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    Starting MWM weight: 177.4 lbs  Starting BMI: 34.65  Starting Waist Measurement: 33 in  Goal weight: 145-150 lbs    Obesity/Excess Weight:  Severity: Mild  Onset:  10+ years    Modifiers: Diet and Exercise, Commercial Weight Loss Programs-ie. Weight Watchers, Lynn James, Nutrisystem, etc., and Prescription Weight Loss Medications  Contributing factors: Poor Food Choices, Insufficient Physical Activity, Stress/Emotional Eating, Binge Eating, Menopause, and Insufficient time to make appropriate lifestyle changes  Associated symptoms: comorbid conditions, fatigue, increased joint pain, decreased exercise capacity, body image issues, decreased self esteem, increased shortness of breath, decreased mobility, depression, inability to do certain activities, and clothes do not fit    Diet recall:  B: 647 toast with eggs and coffee  S: cheese stick OR yogurt OR powercore shake with Amarjit coffee  L: cafeteria food OR salad from chick margy a  S: built bar  D: protein with vegetables sometimes with rice  S: no  Take out frequency: lunch sometimes, once on weekends    Hydration: water - 64 oz, coffee  Alcohol: no  Smoking: no  Exercise: in PT for knee  Occupation: NP for caring with women  Sleep: not well currently - pain in shoulder  STOP ban/8        The following portions of the patient's history were reviewed and updated as appropriate: allergies, current medications, past family history, past medical history, past social history, past surgical history, and problem list.    Family History[3]     Review of Systems   Constitutional:  Negative for fatigue.   HENT:  Negative for sore throat.    Respiratory:  Negative for cough and shortness of breath.    Cardiovascular:  Negative for chest pain, palpitations and leg swelling.   Gastrointestinal:  Negative for abdominal pain, constipation,  diarrhea and nausea.   Genitourinary:  Negative for dysuria.   Musculoskeletal:  Positive for arthralgias. Negative for back pain.   Skin:  Negative for rash.   Neurological:  Negative for headaches.   Psychiatric/Behavioral:  Negative for dysphoric mood. The patient is not nervous/anxious.        Objective:  /80 (Patient Position: Sitting, Cuff Size: Large)   Pulse 75   Ht 5' (1.524 m)   Wt 80.5 kg (177 lb 6.4 oz)   LMP  (LMP Unknown)   BMI 34.65 kg/m²     Physical Exam  Vitals and nursing note reviewed.   Constitutional:       Appearance: Normal appearance. She is obese.   HENT:      Head: Normocephalic.   Pulmonary:      Effort: Pulmonary effort is normal.     Neurological:      General: No focal deficit present.      Mental Status: She is alert and oriented to person, place, and time.     Psychiatric:         Mood and Affect: Mood normal.         Behavior: Behavior normal.         Thought Content: Thought content normal.         Judgment: Judgment normal.              Labs and Imaging  Recent labs and imaging have been personally reviewed.  Lab Results   Component Value Date    WBC 9.51 01/19/2025    HGB 13.2 01/19/2025    HCT 39.1 01/19/2025    MCV 90 01/19/2025     01/19/2025     Lab Results   Component Value Date     05/24/2014    SODIUM 137 01/19/2025    K 4.0 01/19/2025     01/19/2025    CO2 24 01/19/2025    ANIONGAP 6 05/24/2014    AGAP 8 01/19/2025    BUN 24 01/19/2025    CREATININE 0.76 01/19/2025    GLUC 103 01/19/2025    GLUF 101 (H) 11/22/2024    CALCIUM 9.5 01/19/2025    AST 12 (L) 01/19/2025    ALT 11 01/19/2025    ALKPHOS 58 01/19/2025    PROT 8.1 05/24/2014    TP 7.0 01/19/2025    BILITOT 0.4 05/24/2014    TBILI 0.43 01/19/2025    EGFR 93 01/19/2025     Lab Results   Component Value Date    HGBA1C 5.4 08/28/2024     Lab Results   Component Value Date    NWH1DNYDWSOZ 1.818 08/28/2024     Lab Results   Component Value Date    CHOLESTEROL 185 11/22/2024     Lab Results    Component Value Date    HDL 61 2024     Lab Results   Component Value Date    TRIG 92 2024     Lab Results   Component Value Date    LDLCALC 106 (H) 2024          [1]   Past Medical History:  Diagnosis Date    Disease of thyroid gland     2014 nodules on US    Dizziness     Hypertension     Infertility, female     Miscarriage     , ,     Multiple thyroid nodules     Obesity     Resolved 2016    Palpitations     Palpitations     Resolved 2016    PONV (postoperative nausea and vomiting)     Thyroid nodule     Last Assessed: 2014     Thyroid nodule 2019    Vertigo    [2]   Past Surgical History:  Procedure Laterality Date    BREAST BIOPSY Right 2001    Percutaneous Needle Core Right , age 27    BREAST LUMPECTOMY  2002    Right     SECTION      CHOLECYSTECTOMY      Laparoscopic     COLPORRHAPHY N/A 2020    Procedure: POST. COLPORRHAPHY;  Surgeon: Buster Santamaria MD;  Location: [facility];  Service: UroGynecology           ENDOMETRIAL ABLATION  2012    LASIK  10/2020    CO ARTHROSCOPY KNEE W/MENISCUS RPR MEDIAL&LATERAL Right 3/5/2025    Procedure: Right knee arthrosocpic medial meniscus root repair & chondroplasty;  Surgeon: Charli Pickens MD;  Location: [facility];  Service: Orthopedics    CO EXCISION EXCESSIVE SKIN & SUBQ TISSUE ABDOMEN N/A 12/10/2021    Procedure: ABDOMINOPLASTY;  Surgeon: Isaias Ogden MD;  Location:  MAIN OR;  Service: Plastics    TUBAL LIGATION      WISDOM TOOTH EXTRACTION     [3]   Family History  Problem Relation Name Age of Onset    Hypertension Mother Maris 40    Heart disease Mother Maris         a.fib, pacemaker, ablations, rheumatic heart disease, MVP    Nephrolithiasis Mother Maris     Diabetes Mother Maris     Hashimoto's thyroiditis Mother Maris     Other Father Joey Merino 65        Bladder Cancer    Cancer Father Joey Merino 68        Bladder cancer    Hypertension Father Joey Merino           bladder cancer age 68    No Known Problems Sister      No Known Problems Sister      No Known Problems Sister      No Known Problems Brother      No Known Problems Brother      No Known Problems Son      No Known Problems Daughter      No Known Problems Daughter      Hypertension Maternal Grandmother Manasa Conty 40    Diabetes Maternal Grandmother Manasa Conty     Diabetes type II Maternal Grandmother Manasa Conty     Other Maternal Grandfather Patrick     Stroke Maternal Grandfather Patrick         Stroke Syndrome     Prostate cancer Maternal Grandfather Patrick 60    Heart disease Maternal Grandfather Patrick 70        hx CABG    Cancer Maternal Grandfather Patrick         Prostate    Coronary aneurysm Maternal Grandfather Patrick         MI at a young age     Diabetes Maternal Grandfather Patrick     Diabetes Paternal Grandmother Providencia     Cancer Paternal Grandmother Providencia 70        Vulvar     Stroke Paternal Grandmother Providencia 80    Hypertension Paternal Grandmother Providencia     No Known Problems Maternal Aunt      Lymphoma Maternal Uncle  40    Lymphoma Paternal Aunt Paternal aunt 50        non hodgkins follicular lymphoma    Hypothyroidism Paternal Aunt Paternal aunt     No Known Problems Paternal Aunt      No Known Problems Paternal Aunt      No Known Problems Paternal Aunt      No Known Problems Paternal Aunt      Breast cancer Cousin paternal 47        bilateral , mets to abd/pelvis. Genetic testing negative    Hyperthyroidism Family      Hypothyroidism Family

## 2025-07-02 NOTE — PROGRESS NOTES
"Daily Note     Today's date: 2025  Patient name: Radha Gonzalez  : 1975  MRN: [id number]  Referring provider: Charli Pickens*  Dx:   Encounter Diagnosis     ICD-10-CM    1. Acute pain of right knee  M25.561                      Subjective: patient states her knee is improving but she still feels some weakness in the quad      Objective: See treatment diary below      Assessment: Tolerated treatment well. Patient demonstrated fatigue post treatment, exhibited good technique with therapeutic exercises, and would benefit from continued PT Patient with continual quad weakness but it is improving week to week.       Plan: Continue per plan of care.      Precautions: meniscus repair 3/5/25                Manuals    PROM done done done done done done done                                 Neuro Re-Ed          Quad sets          bridges 3x10 15# 3x10 15# 3x10 15# 3x10 15#  3x10 15# 20# 3x10   SLS Airex 20sx3 Airex 3x20s Airex 3x20s Airex 3x20s                                              Ther Ex          Heel slides 2x10 2x10 2x10 2x10      saq          laq          bike 5' lvl  5' lvl 1 5' lvl 1 5' lvl 1 5' lvl 3 5' lvl 3 5' lvl 3   slr 3x10 3# 3x10 3# 3x10 3# 3x10 3#  3x10 3#    Calf s          SL hip abd          Knee ext machine  19# 3x10 SL 15# 3x8 SL 15# 3x10 SL 15# 3x10 SL 15# 3x10 SL 15# 3x10   Leg press  79# 3x10 SL 45# 3x10 SL 45# 3x10 SL 55# 3x10 SL 55# 3x10 SL 59# 3x10   Ther Activity          sts  15# 3x10 15# 3x10 15# 3x10 15# 3x10 20# 3x10 20# 3x10   Weight shifts          Heel raises 3x10 3x10 3x10 3x10  SL 3x10    Step ups 6\" step up 6\" 2x10 step down 6\" 2x10 step down 6\" 3x10 step down 6\" 2x10 step down     hops  3x10 A/P M/L Agility ladder done Agility ladder done  Agility ladder done Agility ladder done   Gait Training          No crutch                    Modalities                                                                                           "

## 2025-07-03 DIAGNOSIS — N95.1 MENOPAUSAL SYMPTOMS: Primary | ICD-10-CM

## 2025-07-03 RX ORDER — ESTRADIOL 0.05 MG/D
1 PATCH TRANSDERMAL WEEKLY
Qty: 12 PATCH | Refills: 3 | Status: SHIPPED | OUTPATIENT
Start: 2025-07-03

## 2025-07-03 RX ORDER — PROGESTERONE 100 MG/1
1 CAPSULE ORAL
Qty: 90 CAPSULE | Refills: 3 | Status: SHIPPED | OUTPATIENT
Start: 2025-07-03

## 2025-07-03 NOTE — ASSESSMENT & PLAN NOTE
- Discussed options of HealthyCORE-Intensive Lifestyle Intervention Program, Very Low Calorie Diet-VLCD, and Conservative Program and the role of weight loss medications.  - Patient is interested in pursuing Conservative Program and follow up visits with medical weight management provider.  - Explained the importance of making lifestyle changes in addition to starting any anti-obesity medications.   - Initial weight loss goal of 5-10% weight loss for improved health. Weight loss can improve patient's co-morbid conditions and/or prevent weight-related complications.  - Weight is not at goal and patient has been unable to achieve a meaningful weight loss above 5% using various programs and tools for more than 6 months  - Labs reviewed from 11/2024 and 1/2025    General Recommendations:  Nutrition:  Eat breakfast daily.  Do not skip meals.      Food log (ie.) [email or web address].[email or web address], sparkpeople.com, loseit.com, calorieking.com, etc.     Practice mindful eating.  Be sure to set aside time to eat, eat slowly, and savor your food.     Hydration:    At least 64oz of water daily.  No sugar sweetened beverages.  No juice (eat the fruit instead).     Exercise:  Studies have shown that the ideal exercise goal is somewhere between 150 to 300 minutes of moderate intensity exercise a week.  Start with exercising 10 minutes every other day and gradually increase physical activity with a goal of at least 150 minutes of moderate intensity exercise a week, divided over at least 3 days a week.  An example of this would be exercising 30 minutes a day, 5 days a week.  Resistance training can increase muscle mass and increase our resting metabolic rate.   FULL BODY resistance training is recommended 2-3 times a week.  Do not do this on consecutive days to allow for muscle recovery.     Aim for a bare minimum 5000 steps, even on days you do not exercise.     Monitoring:   Weigh yourself daily.  If this causes undue stress, then just weigh  yourself once a week.  Weigh yourself the same time of the day with the same amount of clothing on.  Preferably this should be done after waking up, before you eat, and with no clothing or minimal clothing on.     Specific Goals:  Patient lifestyle habits were reviewed and barriers to weight loss were addressed today.  Nutrition was discussed and patient will be meeting with a dietitian to discuss meal planning and complete the body stats package.  She will make sure she is staying within her recommended ranges for both protein and calories.  Medications were discussed:  Phentermine to be avoided due to history of palpitations and hypertension.  Patient also did not have weight loss success on this medication in the past  GLP-1 medications were discussed and patient was interested in Zepbound to help her possible metabolic disease as well as her weight.    Zepbound Instructions:    - Begin Zepbound 2.5 mg subcutaneously once a week. Dose changes may occur after 4 doses if medication is tolerated. You will be assessed prior to each dose change to make sure you are tolerating the medication well.  - Please message me when you have 2 pens left from the prescription so there are no lapses in treatment.  - If you have been off the medication for more than 14 days please contact the office as you will need to restart the titration at the starting dose again to avoid significant side effects or adverse events.  - Visit Zepbound.com for further information/injection instructions.   -Please eat small frequent meals to help reduce nausea. Lemon water and saltine crackers may help with this.   - Side effects of Zepbound discussed: nausea, vomiting, diarrhea, and constipation. If you experience fever, nausea/vomiting, and pain radiating to your back this may be a sign of pancreatitis. Please go to the emergency room if this occurs.  - If on oral birth control a 2nd method of birth control is recommended during the 1st 8 weeks  of therapy and for 4 weeks after any dosage change.   - Patient understands the side effects of the medication and proper administration. Patient agrees with the treatment plan and all questions were answered.  -Supply concerns were discussed with patient and patient voiced understanding that they will need to call with adequate time for refills to avoid any lapses in treatment.  Patient may also have to call around to different pharmacies to see if any pharmacy has the appropriate dose in stock.     no

## 2025-07-07 ENCOUNTER — TELEPHONE (OUTPATIENT)
Dept: BARIATRICS | Facility: CLINIC | Age: 50
End: 2025-07-07

## 2025-07-07 ENCOUNTER — OFFICE VISIT (OUTPATIENT)
Dept: PHYSICAL THERAPY | Facility: REHABILITATION | Age: 50
End: 2025-07-07
Attending: ORTHOPAEDIC SURGERY
Payer: COMMERCIAL

## 2025-07-07 DIAGNOSIS — M25.561 ACUTE PAIN OF RIGHT KNEE: Primary | ICD-10-CM

## 2025-07-07 PROCEDURE — 97530 THERAPEUTIC ACTIVITIES: CPT | Performed by: PHYSICAL THERAPIST

## 2025-07-07 PROCEDURE — 97110 THERAPEUTIC EXERCISES: CPT | Performed by: PHYSICAL THERAPIST

## 2025-07-07 PROCEDURE — 97112 NEUROMUSCULAR REEDUCATION: CPT | Performed by: PHYSICAL THERAPIST

## 2025-07-14 ENCOUNTER — OFFICE VISIT (OUTPATIENT)
Dept: OBGYN CLINIC | Facility: CLINIC | Age: 50
End: 2025-07-14
Payer: COMMERCIAL

## 2025-07-14 VITALS — HEIGHT: 60 IN | WEIGHT: 177 LBS | BODY MASS INDEX: 34.75 KG/M2

## 2025-07-14 DIAGNOSIS — S46.011D TRAUMATIC COMPLETE TEAR OF RIGHT ROTATOR CUFF, SUBSEQUENT ENCOUNTER: Primary | ICD-10-CM

## 2025-07-14 DIAGNOSIS — Z98.890 S/P MEDIAL MENISCUS REPAIR OF RIGHT KNEE: ICD-10-CM

## 2025-07-14 PROCEDURE — 99214 OFFICE O/P EST MOD 30 MIN: CPT | Performed by: ORTHOPAEDIC SURGERY

## 2025-07-14 RX ORDER — CHLORHEXIDINE GLUCONATE ORAL RINSE 1.2 MG/ML
15 SOLUTION DENTAL ONCE
OUTPATIENT
Start: 2025-07-14 | End: 2025-07-14

## 2025-07-14 RX ORDER — CEFAZOLIN SODIUM 2 G/50ML
2000 SOLUTION INTRAVENOUS ONCE
OUTPATIENT
Start: 2025-07-14 | End: 2025-07-14

## 2025-07-14 NOTE — ASSESSMENT & PLAN NOTE
Radha is now nearly 4.5 months s/p right medial meniscus root repair. I believe the patient is progressing appropriately through her post-op recovery. I am pleased with her clinical presentation today She will continue physical therapy per provided protocol. I recommend she use PT to guide her activities. Radha can use OTC medications and ice/heat as needed for pain control. Follow up in 6-8 weeks for recheck.

## 2025-07-14 NOTE — PROGRESS NOTES
Patient Name: Radha Gonzalez      : 1975       MRN: [id number]   Encounter Provider: Charli Pickens MD   Encounter Date: 25  Encounter department: [facility]         Assessment & Plan  S/P medial meniscus repair of right knee  Radha is now nearly 4.5 months s/p right medial meniscus root repair. I believe the patient is progressing appropriately through her post-op recovery. I am pleased with her clinical presentation today She will continue physical therapy per provided protocol. I recommend she use PT to guide her activities. Radha can use OTC medications and ice/heat as needed for pain control. Follow up in 6-8 weeks for recheck.         Traumatic complete tear of right rotator cuff, subsequent encounter    We reviewed Radha's right shoulder MRI today showing a full-thickness supraspinatus tear, resulting in shoulder pain and objective weakness.  Unfortunately, the patient did not obtain any relief from the Toradol injection performed about 1 month ago.  She is attending formal physical therapy currently.  The patient has not experienced significant symptomatic benefit from nonoperative intervention so far. Given Radha's work and  schedule, she is hoping to schedule surgery mid October. We will plan to follow-up with the patient in 6-8 weeks.  We will provide her sling and review the procedure in great detail prior to following consent at that visit.  She will meet with my surgical scheduler today to pick out a surgical date.  In the meantime, the patient can use OTC medications and ice/heat as needed for pain control. She can continue PT sessions focusing on her shoulder and perform activities as tolerated using pain as a guide.    Orders:    Case request operating room: REPAIR ROTATOR CUFF  ARTHROSCOPIC; Standing       _____________________________________________________  CHIEF COMPLAINT:  Chief Complaint   Patient presents with    Right  "Shoulder - Follow-up    Right Knee - Follow-up         SUBJECTIVE:  Patient is a 50 y.o. year old female who presents for follow up now nearly 4.5 months s/p Right knee arthrosocpic medial meniscus root repair & chondroplasty - Right performed on 3/5/2025.  Today, the patient notes the knee is doing well overall. She notes a sharp, pinpoint pain directly over her medial portal incision, especially following agility exercises with PT. Otherwise, she is doing well with PT. She has not started jogging with PT yet, but is working up to this. She denies new injury/trauma, swelling, and numbness/tingling.    Regarding her right shoulder in the setting of full-thickness rotator cuff tear. The patient notes \"an annoying tooth pain all day long\" and pain extends into the night. Dressing and doing her hair are becoming difficult and painful. She notices infrequent, painless popping sensations. The Toradol injection given 1 month ago did not provide any relief. Radha is participating in PT/home exercises for her shoulder. The patient is hoping to have surgery for her shoulder in mid-October due to her work and  schedule. 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     _____________________________________________________  PHYSICAL EXAM:  General/Constitutional: NAD, well developed, well nourished  HENT: Normocephalic, atraumatic  CV: Intact distal pulses, regular rate  Resp: No respiratory distress or labored breathing  Abdomen: soft, nondistended   Lymphatic: No lymphadenopathy palpated  Neuro: Alert and Oriented x 3, no focal deficits  Psych: Normal mood, normal affect  Skin: Warm, dry, no rashes, no erythema    MUSCULOSKELETAL EXAMINATION:    Right Upper Extremity  Palpation: moderate AC joint and bicipital groove TTP  + without significant pain  4/5 Empty Can test, 4+/5 ER, 5/5 IR    Right Lower Extremity   Pinpoint TTP over the medial " portal incision  Incision: well-healed  No joint effusion  Range of Motion: 0-130  +EHL/FHL/TA/GS  SILT throughout  Palpable DP pulse     Scribe Attestation      I,:  Estephania Iraheta PA-C am acting as a scribe while in the presence of the attending physician.:       I,:  Charli Pickens MD personally performed the services described in this documentation    as scribed in my presence.:

## 2025-07-16 ENCOUNTER — OFFICE VISIT (OUTPATIENT)
Dept: PHYSICAL THERAPY | Facility: REHABILITATION | Age: 50
End: 2025-07-16
Attending: ORTHOPAEDIC SURGERY
Payer: COMMERCIAL

## 2025-07-16 DIAGNOSIS — M25.561 ACUTE PAIN OF RIGHT KNEE: Primary | ICD-10-CM

## 2025-07-16 PROCEDURE — 97530 THERAPEUTIC ACTIVITIES: CPT | Performed by: PHYSICAL THERAPIST

## 2025-07-16 PROCEDURE — 97112 NEUROMUSCULAR REEDUCATION: CPT | Performed by: PHYSICAL THERAPIST

## 2025-07-16 PROCEDURE — 97110 THERAPEUTIC EXERCISES: CPT | Performed by: PHYSICAL THERAPIST

## 2025-07-16 NOTE — PROGRESS NOTES
"Daily Note     Today's date: 2025  Patient name: Radha Gonzalez  : 1975  MRN: [id number]  Referring provider: Charli Pickens*  Dx:   Encounter Diagnosis     ICD-10-CM    1. Acute pain of right knee  M25.561                      Subjective: patient states she does get occasional sharp pains in her knee but overall she is doing well      Objective: See treatment diary below      Assessment: Tolerated treatment well. Patient demonstrated fatigue post treatment, exhibited good technique with therapeutic exercises, and would benefit from continued PT Patient with improving ROM and strength. Patient continues to have some quad weakness but it is improving week to week      Plan: Continue per plan of care.      Precautions: meniscus repair 3/5/25                Manuals    PROM done done done done done done done done done                                       Neuro Re-Ed            Quad sets            bridges 3x10 15# 3x10 15# 3x10 15# 3x10 15#  3x10 15# 20# 3x10 20# 3x10 20# 3x10   SLS Airex 20sx3 Airex 3x20s Airex 3x20s Airex 3x20s                                                        Ther Ex            Heel slides 2x10 2x10 2x10 2x10        saq            laq            bike 5' lvl  5' lvl 1 5' lvl 1 5' lvl 1 5' lvl 3 5' lvl 3 5' lvl 3 5' lvl 3 5' lvl 3   slr 3x10 3# 3x10 3# 3x10 3# 3x10 3#  3x10 3#      Calf s            SL hip abd            Knee ext machine  19# 3x10 SL 15# 3x8 SL 15# 3x10 SL 15# 3x10 SL 15# 3x10 SL 15# 3x10 SL 15# 3x10 SL 15# 3x10   Leg press  79# 3x10 SL 45# 3x10 SL 45# 3x10 SL 55# 3x10 SL 55# 3x10 SL 59# 3x10 SL 65# 3x10 SL 65# 3x10   Ther Activity            sts  15# 3x10 15# 3x10 15# 3x10 15# 3x10 20# 3x10 20# 3x10 20# 3x10 20# 3x10   Weight shifts            Heel raises 3x10 3x10 3x10 3x10  SL 3x10  SL 3x10 SL 3x10   Step ups 6\" step up 6\" 2x10 step down 6\" 2x10 step down 6\" 3x10 step down 6\" 2x10 step down       hops  3x10 A/P M/L " Agility ladder done Agility ladder done  Agility ladder done Agility ladder done Agility ladder done with SL hops done   Gait Training            No crutch                        Modalities

## 2025-07-18 ENCOUNTER — OFFICE VISIT (OUTPATIENT)
Dept: PHYSICAL THERAPY | Facility: REHABILITATION | Age: 50
End: 2025-07-18
Attending: ORTHOPAEDIC SURGERY
Payer: COMMERCIAL

## 2025-07-18 DIAGNOSIS — M25.561 ACUTE PAIN OF RIGHT KNEE: Primary | ICD-10-CM

## 2025-07-18 PROCEDURE — 97110 THERAPEUTIC EXERCISES: CPT | Performed by: PHYSICAL THERAPIST

## 2025-07-18 PROCEDURE — 97530 THERAPEUTIC ACTIVITIES: CPT | Performed by: PHYSICAL THERAPIST

## 2025-07-18 NOTE — PROGRESS NOTES
"Daily Note     Today's date: 2025  Patient name: Radha Gonzalez  : 1975  MRN: [id number]  Referring provider: Charli Pickens*  Dx:   Encounter Diagnosis     ICD-10-CM    1. Acute pain of right knee  M25.561                      Subjective: patient states she gets occasional pains in the knee but overall it is doing well      Objective: See treatment diary below      Assessment: Tolerated treatment well. Patient demonstrated fatigue post treatment, exhibited good technique with therapeutic exercises, and would benefit from continued PT Patient was able to tolerate some light treadmill jogging today without pain      Plan: Continue per plan of care.      Precautions: meniscus repair 3/5/25    PT 1 on 1: 9:50-10:13              Manuals    PROM done done done done done done done done done done                                          Neuro Re-Ed             Quad sets             bridges 3x10 15# 3x10 15# 3x10 15# 3x10 15#  3x10 15# 20# 3x10 20# 3x10 20# 3x10 20# 3x10   SLS Airex 20sx3 Airex 3x20s Airex 3x20s Airex 3x20s                                                             Ther Ex             Heel slides 2x10 2x10 2x10 2x10         saq             laq             bike 5' lvl  5' lvl 1 5' lvl 1 5' lvl 1 5' lvl 3 5' lvl 3 5' lvl 3 5' lvl 3 5' lvl 3 5' lvl 3   slr 3x10 3# 3x10 3# 3x10 3# 3x10 3#  3x10 3#       Calf s             SL hip abd             Knee ext machine  19# 3x10 SL 15# 3x8 SL 15# 3x10 SL 15# 3x10 SL 15# 3x10 SL 15# 3x10 SL 15# 3x10 SL 15# 3x10 SL 17# 3x10   Leg press  79# 3x10 SL 45# 3x10 SL 45# 3x10 SL 55# 3x10 SL 55# 3x10 SL 59# 3x10 SL 65# 3x10 SL 65# 3x10 65# SL 3x10   Ther Activity             sts  15# 3x10 15# 3x10 15# 3x10 15# 3x10 20# 3x10 20# 3x10 20# 3x10 20# 3x10 20# 3x10   Weight shifts             Heel raises 3x10 3x10 3x10 3x10  SL 3x10  SL 3x10 SL 3x10    Step ups 6\" step up 6\" 2x10 step down 6\" 2x10 step down 6\" 3x10 " "step down 6\" 2x10 step down        treadmill          4 mph 15 secx3   hops  3x10 A/P M/L Agility ladder done Agility ladder done  Agility ladder done Agility ladder done Agility ladder done with SL hops done    Gait Training             No crutch                          Modalities                                                                                                            "

## 2025-07-21 ENCOUNTER — OFFICE VISIT (OUTPATIENT)
Dept: PHYSICAL THERAPY | Facility: REHABILITATION | Age: 50
End: 2025-07-21
Attending: ORTHOPAEDIC SURGERY
Payer: COMMERCIAL

## 2025-07-21 DIAGNOSIS — M25.561 ACUTE PAIN OF RIGHT KNEE: Primary | ICD-10-CM

## 2025-07-21 PROCEDURE — 97530 THERAPEUTIC ACTIVITIES: CPT | Performed by: PHYSICAL THERAPIST

## 2025-07-21 PROCEDURE — 97112 NEUROMUSCULAR REEDUCATION: CPT | Performed by: PHYSICAL THERAPIST

## 2025-07-21 PROCEDURE — 97110 THERAPEUTIC EXERCISES: CPT | Performed by: PHYSICAL THERAPIST

## 2025-07-21 NOTE — PROGRESS NOTES
Daily Note     Today's date: 2025  Patient name: Radha Gonzalez  : 1975  MRN: [id number]  Referring provider: Charli Pickens*  Dx:   Encounter Diagnosis     ICD-10-CM    1. Acute pain of right knee  M25.561                      Subjective: patient states her knee has been doing well. She didn;t have sorenss after running last session      Objective: See treatment diary below      Assessment: Tolerated treatment well. Patient demonstrated fatigue post treatment, exhibited good technique with therapeutic exercises, and would benefit from continued PT Patient continues to progress well. Patient able to tolerate longer jogging intervals today without apin      Plan: Continue per plan of care.      Precautions: meniscus repair 3/5/25                  Manuals    PROM done done done done done done done done done done done                                             Neuro Re-Ed              Quad sets              bridges 3x10 15# 3x10 15# 3x10 15# 3x10 15#  3x10 15# 20# 3x10 20# 3x10 20# 3x10 20# 3x10 20# 3x10   SLS Airex 20sx3 Airex 3x20s Airex 3x20s Airex 3x20s                                                                  Ther Ex              Heel slides 2x10 2x10 2x10 2x10          saq              laq              bike 5' lvl  5' lvl 1 5' lvl 1 5' lvl 1 5' lvl 3 5' lvl 3 5' lvl 3 5' lvl 3 5' lvl 3 5' lvl 3 5' lvl 3   slr 3x10 3# 3x10 3# 3x10 3# 3x10 3#  3x10 3#        Calf s              SL hip abd              Knee ext machine  19# 3x10 SL 15# 3x8 SL 15# 3x10 SL 15# 3x10 SL 15# 3x10 SL 15# 3x10 SL 15# 3x10 SL 15# 3x10 SL 17# 3x10 SL 17# 3x10   Leg press  79# 3x10 SL 45# 3x10 SL 45# 3x10 SL 55# 3x10 SL 55# 3x10 SL 59# 3x10 SL 65# 3x10 SL 65# 3x10 65# SL 3x10 75# 3x10   Ther Activity              sts  15# 3x10 15# 3x10 15# 3x10 15# 3x10 20# 3x10 20# 3x10 20# 3x10 20# 3x10 20# 3x10 20# 3x10   Weight shifts              Heel raises 3x10 3x10 3x10  "3x10  SL 3x10  SL 3x10 SL 3x10  SL 3x10   Step ups 6\" step up 6\" 2x10 step down 6\" 2x10 step down 6\" 3x10 step down 6\" 2x10 step down         treadmill          4 mph 15 secx3 30sec intervals   hops  3x10 A/P M/L Agility ladder done Agility ladder done  Agility ladder done Agility ladder done Agility ladder done with SL hops done     Gait Training              No crutch                            Modalities                                                                                                                 "

## 2025-07-25 ENCOUNTER — OFFICE VISIT (OUTPATIENT)
Dept: PHYSICAL THERAPY | Facility: REHABILITATION | Age: 50
End: 2025-07-25
Attending: ORTHOPAEDIC SURGERY
Payer: COMMERCIAL

## 2025-07-25 DIAGNOSIS — M25.561 ACUTE PAIN OF RIGHT KNEE: Primary | ICD-10-CM

## 2025-07-25 PROCEDURE — 97530 THERAPEUTIC ACTIVITIES: CPT | Performed by: PHYSICAL THERAPIST

## 2025-07-25 PROCEDURE — 97112 NEUROMUSCULAR REEDUCATION: CPT | Performed by: PHYSICAL THERAPIST

## 2025-07-25 PROCEDURE — 97110 THERAPEUTIC EXERCISES: CPT | Performed by: PHYSICAL THERAPIST

## 2025-07-25 NOTE — PROGRESS NOTES
Daily Note     Today's date: 2025  Patient name: Radha Gonzalez  : 1975  MRN: [id number]  Referring provider: Charli Pickens*  Dx:   Encounter Diagnosis     ICD-10-CM    1. Acute pain of right knee  M25.561                      Subjective: patient states her knee continues to progress well. She is tolerating her light jogging progression well so far      Objective: See treatment diary below      Assessment: Tolerated treatment well. Patient demonstrated fatigue post treatment, exhibited good technique with therapeutic exercises, and would benefit from continued PTPatient with improving tolerance to jogging progression. She is now able to tolerate 30 seconds jogging intervals without pain      Plan: Continue per plan of care.      Precautions: meniscus repair 3/5/25                  Manuals    PROM done done done done done done                              Neuro Re-Ed         Quad sets         bridges 20# 3x10 20# 3x10 20# 3x10 20# 3x10 20# 3x10 20# 3x10   SLS                                             Ther Ex         Heel slides         saq         laq         bike 5' lvl 3 5' lvl 3 5' lvl 3 5' lvl 3 5' lvl 3 5' lvl 3   slr         Calf s         SL hip abd         Knee ext machine SL 15# 3x10 SL 15# 3x10 SL 15# 3x10 SL 17# 3x10 SL 17# 3x10 SL 19# 3x10   Leg press SL 59# 3x10 SL 65# 3x10 SL 65# 3x10 65# SL 3x10 75# 3x10 75# 3x10   Ther Activity         sts 20# 3x10 20# 3x10 20# 3x10 20# 3x10 20# 3x10 20# 3x10   Weight shifts         Heel raises  SL 3x10 SL 3x10  SL 3x10 SL 3x10   Step ups         treadmill    4 mph 15 secx3 30sec intervals 30 sec intervalsx3   hops Agility ladder done Agility ladder done with SL hops done      Gait Training         No crutch                  Modalities

## 2025-07-28 ENCOUNTER — OFFICE VISIT (OUTPATIENT)
Dept: PHYSICAL THERAPY | Facility: REHABILITATION | Age: 50
End: 2025-07-28
Attending: ORTHOPAEDIC SURGERY
Payer: COMMERCIAL

## 2025-07-28 DIAGNOSIS — M25.561 ACUTE PAIN OF RIGHT KNEE: Primary | ICD-10-CM

## 2025-07-28 PROCEDURE — 97110 THERAPEUTIC EXERCISES: CPT | Performed by: PHYSICAL THERAPIST

## 2025-07-28 PROCEDURE — 97112 NEUROMUSCULAR REEDUCATION: CPT | Performed by: PHYSICAL THERAPIST

## 2025-07-28 PROCEDURE — 97530 THERAPEUTIC ACTIVITIES: CPT | Performed by: PHYSICAL THERAPIST

## 2025-07-29 DIAGNOSIS — E66.811 CLASS 1 OBESITY DUE TO EXCESS CALORIES WITH SERIOUS COMORBIDITY AND BODY MASS INDEX (BMI) OF 34.0 TO 34.9 IN ADULT: Primary | ICD-10-CM

## 2025-07-29 DIAGNOSIS — E66.09 CLASS 1 OBESITY DUE TO EXCESS CALORIES WITH SERIOUS COMORBIDITY AND BODY MASS INDEX (BMI) OF 34.0 TO 34.9 IN ADULT: Primary | ICD-10-CM

## 2025-07-29 RX ORDER — TIRZEPATIDE 5 MG/.5ML
5 INJECTION, SOLUTION SUBCUTANEOUS WEEKLY
Qty: 2 ML | Refills: 1 | Status: SHIPPED | OUTPATIENT
Start: 2025-07-29 | End: 2025-09-23

## 2025-08-01 ENCOUNTER — APPOINTMENT (OUTPATIENT)
Dept: LAB | Facility: CLINIC | Age: 50
End: 2025-08-01
Attending: FAMILY MEDICINE
Payer: COMMERCIAL

## 2025-08-01 DIAGNOSIS — Z00.8 HEALTH EXAMINATION IN POPULATION SURVEYS: ICD-10-CM

## 2025-08-01 DIAGNOSIS — I10 BENIGN ESSENTIAL HYPERTENSION: ICD-10-CM

## 2025-08-01 DIAGNOSIS — Z11.1 SCREENING-PULMONARY TB: ICD-10-CM

## 2025-08-01 DIAGNOSIS — E55.9 VITAMIN D DEFICIENCY: ICD-10-CM

## 2025-08-01 DIAGNOSIS — E04.1 THYROID NODULE: ICD-10-CM

## 2025-08-01 LAB
25(OH)D3 SERPL-MCNC: 30.9 NG/ML (ref 30–100)
ALBUMIN SERPL BCG-MCNC: 4.7 G/DL (ref 3.5–5)
ALP SERPL-CCNC: 68 U/L (ref 34–104)
ALT SERPL W P-5'-P-CCNC: 12 U/L (ref 7–52)
ANION GAP SERPL CALCULATED.3IONS-SCNC: 7 MMOL/L (ref 4–13)
AST SERPL W P-5'-P-CCNC: 12 U/L (ref 13–39)
BILIRUB SERPL-MCNC: 0.57 MG/DL (ref 0.2–1)
BUN SERPL-MCNC: 23 MG/DL (ref 5–25)
CALCIUM SERPL-MCNC: 10.1 MG/DL (ref 8.4–10.2)
CHLORIDE SERPL-SCNC: 103 MMOL/L (ref 96–108)
CHOLEST SERPL-MCNC: 168 MG/DL (ref ?–200)
CO2 SERPL-SCNC: 26 MMOL/L (ref 21–32)
CREAT SERPL-MCNC: 0.77 MG/DL (ref 0.6–1.3)
EST. AVERAGE GLUCOSE BLD GHB EST-MCNC: 114 MG/DL
GFR SERPL CREATININE-BSD FRML MDRD: 90 ML/MIN/1.73SQ M
GLUCOSE P FAST SERPL-MCNC: 88 MG/DL (ref 65–99)
HBA1C MFR BLD: 5.6 %
HDLC SERPL-MCNC: 52 MG/DL
LDLC SERPL CALC-MCNC: 101 MG/DL (ref 0–100)
NONHDLC SERPL-MCNC: 116 MG/DL
POTASSIUM SERPL-SCNC: 3.9 MMOL/L (ref 3.5–5.3)
PROT SERPL-MCNC: 7.8 G/DL (ref 6.4–8.4)
SODIUM SERPL-SCNC: 136 MMOL/L (ref 135–147)
TRIGL SERPL-MCNC: 74 MG/DL (ref ?–150)
TSH SERPL DL<=0.05 MIU/L-ACNC: 0.95 UIU/ML (ref 0.45–4.5)

## 2025-08-01 PROCEDURE — 80061 LIPID PANEL: CPT

## 2025-08-01 PROCEDURE — 84443 ASSAY THYROID STIM HORMONE: CPT

## 2025-08-01 PROCEDURE — 36415 COLL VENOUS BLD VENIPUNCTURE: CPT

## 2025-08-01 PROCEDURE — 80053 COMPREHEN METABOLIC PANEL: CPT

## 2025-08-01 PROCEDURE — 82306 VITAMIN D 25 HYDROXY: CPT

## 2025-08-01 PROCEDURE — 83036 HEMOGLOBIN GLYCOSYLATED A1C: CPT

## 2025-08-01 PROCEDURE — 86480 TB TEST CELL IMMUN MEASURE: CPT

## 2025-08-02 LAB
GAMMA INTERFERON BACKGROUND BLD IA-ACNC: 0.09 IU/ML
M TB IFN-G BLD-IMP: NEGATIVE
M TB IFN-G CD4+ BCKGRND COR BLD-ACNC: 0.02 IU/ML
M TB IFN-G CD4+ BCKGRND COR BLD-ACNC: 0.03 IU/ML
MITOGEN IGNF BCKGRD COR BLD-ACNC: 9.91 IU/ML

## 2025-08-04 ENCOUNTER — OFFICE VISIT (OUTPATIENT)
Dept: PHYSICAL THERAPY | Facility: REHABILITATION | Age: 50
End: 2025-08-04
Attending: ORTHOPAEDIC SURGERY
Payer: COMMERCIAL

## 2025-08-04 DIAGNOSIS — M25.561 ACUTE PAIN OF RIGHT KNEE: Primary | ICD-10-CM

## 2025-08-04 PROCEDURE — 97530 THERAPEUTIC ACTIVITIES: CPT | Performed by: PHYSICAL THERAPIST

## 2025-08-04 PROCEDURE — 97110 THERAPEUTIC EXERCISES: CPT | Performed by: PHYSICAL THERAPIST

## 2025-08-04 PROCEDURE — 97112 NEUROMUSCULAR REEDUCATION: CPT | Performed by: PHYSICAL THERAPIST

## 2025-08-06 ENCOUNTER — OFFICE VISIT (OUTPATIENT)
Dept: PHYSICAL THERAPY | Facility: REHABILITATION | Age: 50
End: 2025-08-06
Attending: ORTHOPAEDIC SURGERY
Payer: COMMERCIAL

## 2025-08-06 DIAGNOSIS — M25.561 ACUTE PAIN OF RIGHT KNEE: Primary | ICD-10-CM

## 2025-08-06 PROCEDURE — 97112 NEUROMUSCULAR REEDUCATION: CPT | Performed by: PHYSICAL THERAPIST

## 2025-08-06 PROCEDURE — 97110 THERAPEUTIC EXERCISES: CPT | Performed by: PHYSICAL THERAPIST

## 2025-08-06 PROCEDURE — 97530 THERAPEUTIC ACTIVITIES: CPT | Performed by: PHYSICAL THERAPIST

## (undated) DEVICE — ADHESIVE SKIN CLSR DERMABOND NX

## (undated) DEVICE — STANDARD SURGICAL GOWN, L: Brand: CONVERTORS

## (undated) DEVICE — 1016 S-DRAPE IRRIG POUCH 10/BOX: Brand: STERI-DRAPE™

## (undated) DEVICE — MAT ABSORBANT ARTHROSCOPY FLOOR 46 X 40 IN

## (undated) DEVICE — SUT VICRYL 3-0 REEL 54 IN J285G

## (undated) DEVICE — U-DRAPE: Brand: CONVERTORS

## (undated) DEVICE — SPONGE LAP 18 X 18 IN STRL RFD

## (undated) DEVICE — TUBING SUCTION 5MM X 12 FT

## (undated) DEVICE — PROBE ABLATION  APOLLO RF 50 DEG MULTI PORT

## (undated) DEVICE — GLOVE INDICATOR PI UNDERGLOVE SZ 6.5 BLUE

## (undated) DEVICE — SINGLE PORT MANIFOLD: Brand: NEPTUNE 2

## (undated) DEVICE — TUBING INFILTRATION 9FT

## (undated) DEVICE — SUT CHROMIC 5-0 P-3 18 IN 687G

## (undated) DEVICE — STRAP LITHOTOMY CANDY CANE

## (undated) DEVICE — GLOVE INDICATOR PI UNDERGLOVE SZ 8 BLUE

## (undated) DEVICE — TOWEL SURG XR DETECT GREEN STRL RFD

## (undated) DEVICE — 3M™ IOBAN™ 2 ANTIMICROBIAL INCISE DRAPE 6640EZ: Brand: IOBAN™ 2

## (undated) DEVICE — SUPER SPONGES,MEDIUM: Brand: KERLIX

## (undated) DEVICE — BINDER ABDOMINAL 46-62 IN

## (undated) DEVICE — FABRIC REINFORCED, SURGICAL GOWN, XL: Brand: CONVERTORS

## (undated) DEVICE — 2000CC GUARDIAN II: Brand: GUARDIAN

## (undated) DEVICE — DRAIN HUBLESS 15FR 3 1/16IN

## (undated) DEVICE — SUT VICRYL 2-0 SH 27 IN UNDYED J417H

## (undated) DEVICE — LAPAROTOMY SPONGE - RF AND X-RAY DETECTABLE PRE-WASHED: Brand: SITUATE

## (undated) DEVICE — OCCLUSIVE GAUZE STRIP,3% BISMUTH TRIBROMOPHENATE IN PETROLATUM BLEND: Brand: XEROFORM

## (undated) DEVICE — JACKSON-PRATT 100CC BULB RESERVOIR: Brand: CARDINAL HEALTH

## (undated) DEVICE — DUAL SPIKE ADAPTER: Brand: CONMED

## (undated) DEVICE — STERILE POLYISOPRENE POWDER-FREE SURGICAL GLOVES: Brand: PROTEXIS

## (undated) DEVICE — BLADE SHAVER TORPEDO CRV 4MM 13MM COOLCUT

## (undated) DEVICE — SUT MONOCRYL 3-0 PS-2 27 IN Y427H

## (undated) DEVICE — BULB SYRINGE,IRRIGATION WITH PROTECTIVE CAP: Brand: DOVER

## (undated) DEVICE — ELECTROSURGICAL DEVICE HOLSTER;FOR USE WITH MAXIMUM PEAK VOLTAGE OF 4000 V: Brand: FORCE TRIVERSE

## (undated) DEVICE — MEDI-VAC YANKAUER SUCTION HANDLE W/BULBOUS AND CONTROL VENT: Brand: CARDINAL HEALTH

## (undated) DEVICE — VIAL DECANTER

## (undated) DEVICE — NON-STERILE REUSABLE TOURNIQUET CUFF SINGLE BLADDER, DUAL PORT AND QUICK CONNECT CONNECTOR: Brand: COLOR CUFF

## (undated) DEVICE — 3M™ STERI-DRAPE™ U-DRAPE 1015: Brand: STERI-DRAPE™

## (undated) DEVICE — ABDOMINAL PAD: Brand: DERMACEA

## (undated) DEVICE — SUT ETHILON 3-0 PS-1 18 IN 1663G

## (undated) DEVICE — INTENDED FOR TISSUE SEPARATION, AND OTHER PROCEDURES THAT REQUIRE A SHARP SURGICAL BLADE TO PUNCTURE OR CUT.: Brand: BARD-PARKER ® SAFETYLOCK CARBON RIB-BACK BLADES

## (undated) DEVICE — CAUTERY TIP POLISHER: Brand: DEVON

## (undated) DEVICE — ACE WRAP 6 IN UNSTERILE

## (undated) DEVICE — SUT VICRYL 3-0 SH 27 IN J416H

## (undated) DEVICE — TRAY FOLEY 16FR URIMETER SURESTEP

## (undated) DEVICE — NEEDLE HYPO 22G X 1-1/2 IN

## (undated) DEVICE — PACK ARTHROSCOPY

## (undated) DEVICE — SUT ETHILON 4-0 PS-2 18 IN 1667H

## (undated) DEVICE — ASTOUND SURGICAL GOWN, XXX LARGE, X-LONG: Brand: CONVERTORS

## (undated) DEVICE — ACE WRAP 6 IN XL STERILE

## (undated) DEVICE — CATH FOLEY 18FR 5ML 2 WAY UNCOATED SILICONE

## (undated) DEVICE — SYRINGE 10ML LL

## (undated) DEVICE — SKIN MARKER DUAL TIP WITH RULER CAP, FLEXIBLE RULER AND LABELS: Brand: DEVON

## (undated) DEVICE — SUT VICRYL 2-0 CT-1 27 IN J259H

## (undated) DEVICE — BANDAGE, ESMARK LF STR 6"X9' (20/CS): Brand: CYPRESS

## (undated) DEVICE — SCD SEQUENTIAL COMPRESSION COMFORT SLEEVE MEDIUM KNEE LENGTH: Brand: KENDALL SCD

## (undated) DEVICE — LIGHT GLOVE GREEN

## (undated) DEVICE — IV FLUSH NSS 10ML POSIFLUSH

## (undated) DEVICE — GLOVE SRG BIOGEL 8

## (undated) DEVICE — CHEST/BREAST DRAPE: Brand: CONVERTORS

## (undated) DEVICE — BASIC PACK: Brand: CONVERTORS

## (undated) DEVICE — CURITY NON-ADHERENT STRIPS: Brand: CURITY

## (undated) DEVICE — SMOKE EVACUATION TUBING WITH 8 IN INTEGRAL WAND AND SPONGE GUARD: Brand: BUFFALO FILTER

## (undated) DEVICE — SUT PROLENE 0 CT-1 30 IN 8424H

## (undated) DEVICE — TUBING ARTHROSCOPIC WAVE  MAIN PUMP

## (undated) DEVICE — GLOVE SRG BIOGEL 6.5

## (undated) DEVICE — TUBING LIPOSUCTION ASPIRATION 12FT STERILE

## (undated) DEVICE — UNDER BUTTOCKS DRAPE W/FLUID CONTROL POUCH: Brand: CONVERTORS

## (undated) DEVICE — NEEDLE BLUNT 18 G X 1 1/2IN

## (undated) DEVICE — CHLORAPREP HI-LITE 26ML ORANGE

## (undated) DEVICE — SPONGE GAUZE 4 X 8 12 PLY STRL LF

## (undated) DEVICE — PENCIL ELECTROSURG E-Z CLEAN -0035H

## (undated) DEVICE — 1820 FOAM BLOCK NEEDLE COUNTER: Brand: DEVON

## (undated) DEVICE — SPONGE STICK WITH PVP-I: Brand: KENDALL

## (undated) DEVICE — SYRINGE 30ML LL

## (undated) DEVICE — GLOVE PI ULTRA TOUCH SZ.8.0

## (undated) DEVICE — CAST PADDING 4 IN SYNTHETIC NON-STRL

## (undated) DEVICE — INTENDED FOR TISSUE SEPARATION, AND OTHER PROCEDURES THAT REQUIRE A SHARP SURGICAL BLADE TO PUNCTURE OR CUT.: Brand: BARD-PARKER ® CARBON RIB-BACK BLADES

## (undated) DEVICE — TIBURON EXTREMITY SHEET: Brand: CONVERTORS

## (undated) DEVICE — NEEDLE 25G X 1 1/2

## (undated) DEVICE — CANNISTER WASTE IMPLOSION PROOF

## (undated) DEVICE — ALLENTOWN DR  LUCENTE S LAP PK: Brand: CARDINAL HEALTH

## (undated) DEVICE — PREMIUM DRY TRAY LF: Brand: MEDLINE INDUSTRIES, INC.